# Patient Record
Sex: FEMALE | Race: WHITE | NOT HISPANIC OR LATINO | Employment: FULL TIME | URBAN - METROPOLITAN AREA
[De-identification: names, ages, dates, MRNs, and addresses within clinical notes are randomized per-mention and may not be internally consistent; named-entity substitution may affect disease eponyms.]

---

## 2017-01-05 DIAGNOSIS — F41.9 ANXIETY: ICD-10-CM

## 2017-01-05 RX ORDER — ALPRAZOLAM 0.25 MG/1
TABLET ORAL
Qty: 30 TABLET | Refills: 0 | Status: SHIPPED | OUTPATIENT
Start: 2017-01-05 | End: 2017-01-23 | Stop reason: SDUPTHER

## 2017-01-12 ENCOUNTER — TELEPHONE (OUTPATIENT)
Dept: OBSTETRICS AND GYNECOLOGY | Facility: CLINIC | Age: 37
End: 2017-01-12

## 2017-01-12 NOTE — TELEPHONE ENCOUNTER
Pt states she was told to call Lesley to complete the paperwork for a gene mutation test for this coming Monday.      Pt Ph  [phone]

## 2017-01-16 DIAGNOSIS — Z80.3 FAMILY HISTORY OF BREAST CANCER IN MOTHER: Primary | ICD-10-CM

## 2017-01-23 ENCOUNTER — OFFICE VISIT (OUTPATIENT)
Dept: OBSTETRICS AND GYNECOLOGY | Facility: CLINIC | Age: 37
End: 2017-01-23
Payer: COMMERCIAL

## 2017-01-23 DIAGNOSIS — N92.0 MENORRHAGIA WITH REGULAR CYCLE: ICD-10-CM

## 2017-01-23 DIAGNOSIS — F41.9 ANXIETY: ICD-10-CM

## 2017-01-23 PROCEDURE — 99999 PR PBB SHADOW E&M-EST. PATIENT-LVL II: CPT | Mod: PBBFAC,,, | Performed by: OBSTETRICS & GYNECOLOGY

## 2017-01-23 PROCEDURE — 1159F MED LIST DOCD IN RCRD: CPT | Mod: S$GLB,,, | Performed by: OBSTETRICS & GYNECOLOGY

## 2017-01-23 PROCEDURE — 99212 OFFICE O/P EST SF 10 MIN: CPT | Mod: S$GLB,,, | Performed by: OBSTETRICS & GYNECOLOGY

## 2017-01-23 RX ORDER — MOMETASONE FUROATE 1 MG/G
OINTMENT TOPICAL
Refills: 0 | COMMUNITY
Start: 2017-01-05 | End: 2017-03-23 | Stop reason: CLARIF

## 2017-01-23 NOTE — MR AVS SNAPSHOT
[facility]  [address]  [place] [address]  Phone: [phone]  Fax: [phone]                  Erin Gonzalez   2017 1:30 PM   Office Visit    Description:  Female : 1980   Provider:  Nguyễn Barbosa MD   Department:  [facility]           Reason for Visit     Gyn ultrasound                To Do List           Goals (5 Years of Data)     None      [facility] On Call     [facility] On Call Nurse Care Line -  Assistance  Registered nurses in the [facility] On Call Center provide clinical advisement, health education, appointment booking, and other advisory services.  Call for this free service at [phone].             Medications                Verify that the below list of medications is an accurate representation of the medications you are currently taking.  If none reported, the list may be blank. If incorrect, please contact your healthcare provider. Carry this list with you in case of emergency.           Current Medications     alprazolam (XANAX) 0.25 MG tablet TAKE 1 TABLET BY MOUTH NIGHTLY AS NEEDED FOR ANXIETY    escitalopram oxalate (LEXAPRO) 20 MG tablet Take 1 tablet (20 mg total) by mouth once daily.    mometasone (ELOCON) 0.1 % ointment NELI BID    topiramate (TOPAMAX) 25 MG tablet TK 1 T PO D           Clinical Reference Information           Allergies as of 2017     Ciprofloxacin    Cephalexin      Immunizations Administered on Date of Encounter - 2017     None

## 2017-01-23 NOTE — PROGRESS NOTES
Subjective:       Patient ID: Erin Gonzalez is a 36 y.o. female.    Chief Complaint:  Gyn ultrasound (BRCA results pending)      History of Present Illness  Patient having 7 day cycles passing heavy clots and having to change her pad or tampon hourly on the worst day. We discussed endometrial ablation last year but her mom was in during chemotherapy and the patient could not take time off. Patient now interested in proceeding with an ablation.   Patient reports her cycles are lasting 7 days and she is changing her pad hourly for the first 3 days.  She is not a good candidate for birth control as her mother had breast cancer and patient is very nervous and concerned about birth control pills.  Her  has had a vasectomy for contraception      GYN & OB History  No LMP recorded.   Date of Last Pap: 2017    OB History    Para Term  AB SAB TAB Ectopic Multiple Living   4 4 3 1      4      # Outcome Date GA Lbr Jero/2nd Weight Sex Delivery Anes PTL Lv   4 Term  39w0d  3.6 kg (7 lb 15 oz) F Vag-Spont   Y   3 Term  38w0d  3.43 kg (7 lb 9 oz) F Vag-Spont   Y   2 Term  37w0d  3.572 kg (7 lb 14 oz) F Vag-Spont   Y   1   36w0d  3.402 kg (7 lb 8 oz) F Vag-Spont   Y          Review of Systems  Review of Systems   Constitutional: Negative for fatigue and unexpected weight change.   Respiratory: Negative for shortness of breath.    Cardiovascular: Negative for chest pain.   Gastrointestinal: Negative for abdominal pain, constipation, diarrhea, nausea and vomiting.   Genitourinary: Negative for dysuria.   Musculoskeletal: Negative for back pain.   Skin: Negative for rash.   Neurological: Negative for headaches.   Hematological: Does not bruise/bleed easily.   Psychiatric/Behavioral: Negative for behavioral problems.        Objective:   Physical Exam:   Constitutional: She appears well-developed and well-nourished.                                  Assessment/ Plan:          Erin was seen  today for gyn ultrasound.    Diagnoses and all orders for this visit:    Menorrhagia with regular cycle   patient desires to proceed with endometrial ablation.  Risk and benefits extensively discussed with patient.    Patient still awaiting results of BRCA testing.  Drawn one week ago.    No Follow-up on file.      Health Maintenance       Date Due Completion Date    Lipid Panel 1980 ---    TETANUS VACCINE 4/12/1999 ---    Influenza Vaccine 8/1/2016 ---    Pap Smear 12/12/2019 12/12/2016

## 2017-01-24 RX ORDER — ALPRAZOLAM 0.25 MG/1
TABLET ORAL
Qty: 30 TABLET | Refills: 0 | Status: SHIPPED | OUTPATIENT
Start: 2017-01-24 | End: 2017-01-28 | Stop reason: SDUPTHER

## 2017-01-26 ENCOUNTER — TELEPHONE (OUTPATIENT)
Dept: OBSTETRICS AND GYNECOLOGY | Facility: CLINIC | Age: 37
End: 2017-01-26

## 2017-01-28 DIAGNOSIS — F41.9 ANXIETY: ICD-10-CM

## 2017-01-30 ENCOUNTER — PATIENT MESSAGE (OUTPATIENT)
Dept: OBSTETRICS AND GYNECOLOGY | Facility: CLINIC | Age: 37
End: 2017-01-30

## 2017-01-30 RX ORDER — ALPRAZOLAM 0.25 MG/1
TABLET ORAL
Qty: 30 TABLET | Refills: 0 | Status: SHIPPED | OUTPATIENT
Start: 2017-01-30 | End: 2017-02-22 | Stop reason: DRUGHIGH

## 2017-02-06 ENCOUNTER — PATIENT MESSAGE (OUTPATIENT)
Dept: OBSTETRICS AND GYNECOLOGY | Facility: CLINIC | Age: 37
End: 2017-02-06

## 2017-02-22 ENCOUNTER — PATIENT MESSAGE (OUTPATIENT)
Dept: OBSTETRICS AND GYNECOLOGY | Facility: CLINIC | Age: 37
End: 2017-02-22

## 2017-02-22 DIAGNOSIS — F41.0 PANIC ATTACKS: Primary | ICD-10-CM

## 2017-02-22 RX ORDER — ALPRAZOLAM 1 MG/1
1 TABLET ORAL NIGHTLY PRN
Qty: 30 TABLET | Refills: 0 | Status: CANCELLED | OUTPATIENT
Start: 2017-02-22 | End: 2018-02-22

## 2017-02-22 RX ORDER — ALPRAZOLAM 1 MG/1
1 TABLET ORAL NIGHTLY PRN
Qty: 30 TABLET | Refills: 0 | Status: SHIPPED | OUTPATIENT
Start: 2017-02-22 | End: 2017-05-09 | Stop reason: SDUPTHER

## 2017-02-22 NOTE — TELEPHONE ENCOUNTER
Bone pt requesting her xanex prescription be increased to 1mg. States that she has been taking 3 once a day at night to sleep when needed and the effects are not lasting very long. States that she is still having panic attacks at night when she lays down. Allergies and pharmacy are up to date.

## 2017-02-24 ENCOUNTER — PATIENT MESSAGE (OUTPATIENT)
Dept: OBSTETRICS AND GYNECOLOGY | Facility: CLINIC | Age: 37
End: 2017-02-24

## 2017-02-27 RX ORDER — ZOLPIDEM TARTRATE 5 MG/1
5 TABLET ORAL NIGHTLY PRN
Qty: 20 TABLET | Refills: 3 | Status: SHIPPED | OUTPATIENT
Start: 2017-02-27 | End: 2017-03-20

## 2017-03-06 ENCOUNTER — TELEPHONE (OUTPATIENT)
Dept: OBSTETRICS AND GYNECOLOGY | Facility: CLINIC | Age: 37
End: 2017-03-06

## 2017-03-06 DIAGNOSIS — N92.0 EXCESSIVE OR FREQUENT MENSTRUATION: Primary | ICD-10-CM

## 2017-03-06 NOTE — TELEPHONE ENCOUNTER
Dr Barbosa pt calling was seen a couple of weeks back and was told someone would be calling her for an ablation.Pt states she hasn't heard form anyone yet and was told by dr barbosa that Yaneli would be calling her.I spoke with Yaneli and she has nothing on this pt and she would like to get this done. Pt # [phone]

## 2017-03-16 ENCOUNTER — OFFICE VISIT (OUTPATIENT)
Dept: OBSTETRICS AND GYNECOLOGY | Facility: CLINIC | Age: 37
End: 2017-03-16
Payer: COMMERCIAL

## 2017-03-16 VITALS
DIASTOLIC BLOOD PRESSURE: 72 MMHG | BODY MASS INDEX: 40.22 KG/M2 | HEART RATE: 72 BPM | HEIGHT: 64 IN | RESPIRATION RATE: 12 BRPM | SYSTOLIC BLOOD PRESSURE: 110 MMHG | WEIGHT: 235.56 LBS

## 2017-03-16 DIAGNOSIS — Z01.818 PRE-OP EXAMINATION: Primary | ICD-10-CM

## 2017-03-16 DIAGNOSIS — N92.0 MENORRHAGIA WITH REGULAR CYCLE: ICD-10-CM

## 2017-03-16 DIAGNOSIS — N92.0 MENORRHAGIA: ICD-10-CM

## 2017-03-16 DIAGNOSIS — Z01.818 PREOP EXAMINATION: ICD-10-CM

## 2017-03-16 PROCEDURE — 99999 PR PBB SHADOW E&M-EST. PATIENT-LVL III: CPT | Mod: PBBFAC,,, | Performed by: OBSTETRICS & GYNECOLOGY

## 2017-03-16 PROCEDURE — 99499 UNLISTED E&M SERVICE: CPT | Mod: S$GLB,,, | Performed by: OBSTETRICS & GYNECOLOGY

## 2017-03-16 RX ORDER — ONDANSETRON HYDROCHLORIDE 8 MG/1
8 TABLET, FILM COATED ORAL EVERY 8 HOURS PRN
Qty: 15 TABLET | Refills: 0 | Status: SHIPPED | OUTPATIENT
Start: 2017-03-16 | End: 2017-04-27

## 2017-03-16 RX ORDER — OXYCODONE AND ACETAMINOPHEN 7.5; 325 MG/1; MG/1
1 TABLET ORAL EVERY 4 HOURS PRN
Qty: 15 TABLET | Refills: 0 | Status: SHIPPED | OUTPATIENT
Start: 2017-03-16 | End: 2017-04-27

## 2017-03-16 RX ORDER — ESZOPICLONE 3 MG/1
3 TABLET, FILM COATED ORAL NIGHTLY
Qty: 30 TABLET | Refills: 3 | Status: SHIPPED | OUTPATIENT
Start: 2017-03-16 | End: 2017-11-02

## 2017-03-16 RX ORDER — IBUPROFEN 800 MG/1
800 TABLET ORAL EVERY 8 HOURS PRN
Qty: 30 TABLET | Refills: 0 | Status: SHIPPED | OUTPATIENT
Start: 2017-03-16 | End: 2017-04-27

## 2017-03-16 NOTE — PROGRESS NOTES
CC: preop for moenorrhagia    Rubina Gonzalez is a 36 y.o. female  .  She is established.    Patient having 7 day cycles passing heavy clots and having to change her pad or tampon hourly on the worst day. We discussed endometrial ablation last year but her mom was in during chemotherapy and the patient could not take time off. Patient now interested in proceeding with an ablation and here for preop   Patient reports her cycles are lasting 7 days and she is changing her pad hourly for the first 3 days. She is not a good candidate for birth control as her mother had breast cancer and patient is very nervous and concerned about birth control pills.   Her  has had a vasectomy for contraception    Past Medical History:   Diagnosis Date    Abnormal Pap smear of cervix 2015    ASCUS / Hpv Neg    Depression     Family history of breast cancer in mother     dx. age 48    Galactorrhea     Insomnia     Migraine     topomax    Panic attacks        Past Surgical History:   Procedure Laterality Date    CHOLECYSTECTOMY         OB History    Para Term  AB SAB TAB Ectopic Multiple Living   4 4 3 1      4      # Outcome Date GA Lbr Jero/2nd Weight Sex Delivery Anes PTL Lv   4 Term  39w0d  3.6 kg (7 lb 15 oz) F Vag-Spont EPI  Y   3 Term  38w0d  3.43 kg (7 lb 9 oz) F Vag-Spont EPI  Y   2 Term  37w0d  3.572 kg (7 lb 14 oz) F Vag-Spont EPI  Y   1   36w0d  3.402 kg (7 lb 8 oz) F Vag-Spont EPI  Y      Obstetric Comments   Menarche 13       Family History   Problem Relation Age of Onset    Colon cancer Paternal Grandmother     Cancer Paternal Grandmother     Breast cancer Paternal Grandmother     Colon cancer Maternal Grandfather     Cancer Maternal Grandfather     Hypertension Father     Breast cancer Mother 57    Hypertension Mother     Cancer Mother     Breast cancer Paternal Aunt     Cancer Paternal Aunt     Ovarian cancer Neg Hx        Social History  "  Substance Use Topics    Smoking status: Never Smoker    Smokeless tobacco: None    Alcohol use Yes      Comment: Socially       /72  Pulse 72  Resp 12  Ht 5' 4" (1.626 m)  Wt 106.8 kg (235 lb 9 oz)  LMP 03/13/2017 (Exact Date)  BMI 40.43 kg/m2    ROS:  GENERAL: Denies weight gain or weight loss. Feeling well overall.   SKIN: Denies rash or lesions.   HEAD: Denies head injury or headache.   NODES: Denies enlarged lymph nodes.   CHEST: Denies chest pain or shortness of breath.   CARDIOVASCULAR: Denies palpitations or left sided chest pain.   ABDOMEN: No abdominal pain, constipation, diarrhea, nausea, vomiting or rectal bleeding.   URINARY: No frequency, dysuria, hematuria, or burning on urination.  REPRODUCTIVE: See HPI.   BREASTS: denies pain, lumps, or nipple discharge.   HEMATOLOGIC: No easy bruisability or excessive bleeding.  MUSCULOSKELETAL: Denies joint pain or swelling.   NEUROLOGIC: Denies syncope or weakness.   PSYCHIATRIC: Denies depression, anxiety or mood swings.    Physical Exam:    APPEARANCE: Well nourished, well developed, in no acute distress.  AFFECT: WNL, alert and oriented x 3  SKIN: No acne or hirsutism  NECK: Neck symmetric without masses or thyromegaly  CVS RRR  Lungs CTAB  ABDOMEN: Soft.  No tenderness or masses.  No hepatosplenomegaly.  No hernias.  PELVIC: deferred  EXTREMITIES: No edema.     Pelvic u/s:  Findings: The uterus measures 8.8 x 4.6 x 5.6 cm. Uterine parenchyma is homogeneous without evidence for masses. The endometrial stripe is normal in thickness and measures 0.6 cm.  The right ovary is normal in size and measures 3.3 x 3.4 x 2.0 cm. The left ovary is normal in size and measures 3.3 x 3.1 x 2.5 cm. Follicles are seen in both ovaries. Arterial and venous flow are preserved bilaterally. No free fluid is seen.      ASSESSMENT AND PLAN    Rubina was seen today for pre-op exam.    Diagnoses and all orders for this visit:    Pre-op examination  -     ibuprofen " (ADVIL,MOTRIN) 800 MG tablet; Take 1 tablet (800 mg total) by mouth every 8 (eight) hours as needed for Pain.  -     oxycodone-acetaminophen (PERCOCET) 7.5-325 mg per tablet; Take 1 tablet by mouth every 4 (four) hours as needed for Pain.  -     ondansetron (ZOFRAN) 8 MG tablet; Take 1 tablet (8 mg total) by mouth every 8 (eight) hours as needed for Nausea.    Preop examination    Menorrhagia with regular cycle    Other orders  -     eszopiclone (LUNESTA) 3 mg Tab; Take 1 tablet (3 mg total) by mouth nightly.        Return in about 4 weeks (around 4/13/2017).

## 2017-03-16 NOTE — MR AVS SNAPSHOT
[facility]  [address]  [place] [address]  Phone: [phone]  Fax: [phone]                  Rubina Gonzalez   3/16/2017 2:15 PM   Office Visit    Description:  Female : 1980   Provider:  Nguyễn Barbosa MD   Department:  [facility]           Reason for Visit     Pre-op Exam           Diagnoses this Visit        Comments    Pre-op examination    -  Primary     Preop examination         Menorrhagia with regular cycle                To Do List           Future Appointments        Provider Department Dept Phone    3/23/2017 9:00 AM PRE-ADMIT, [facility] [phone]    2017 2:00 PM Nguyễn Barbosa MD [facility] [phone]      Your Future Surgeries/Procedures     Mar 28, 2017   Surgery with Nguyễn Barbosa MD   [facility] ([facility])    [address] [facility] [id number] [phone]              Goals (5 Years of Data)     None      Follow-Up and Disposition     Return in about 4 weeks (around 2017).       These Medications        Disp Refills Start End    ibuprofen (ADVIL,MOTRIN) 800 MG tablet 30 tablet 0 3/16/2017 3/16/2018    Take 1 tablet (800 mg total) by mouth every 8 (eight) hours as needed for Pain. - Oral    Pharmacy: [facility] [address] [place] [address] [address] AT [facility] Ph #: [phone]       oxycodone-acetaminophen (PERCOCET) 7.5-325 mg per tablet 15 tablet 0 3/16/2017 3/16/2018    Take 1 tablet by mouth every 4 (four) hours as needed for Pain. - Oral    Pharmacy: [facility] [address] [place], [place] [address] [address] AT [facility] Ph #: [phone]       ondansetron (ZOFRAN) 8 MG tablet 15 tablet 0 3/16/2017     Take 1 tablet (8 mg total) by mouth every 8 (eight) hours as needed for Nausea. - Oral    Pharmacy: [facility] [id number] AT [facility] Ph #: [phone]       eszopiclone (LUNESTA) 3 mg Tab 30 tablet 3 3/16/2017 3/16/2018    Take 1 tablet (3 mg total) by mouth nightly. - Oral    Pharmacy: [facility] [id number]  [address] [facility] Ph #: [phone]         [facility] On Call     [facility] On Call Nurse Care Line - 24/7 Assistance  Registered nurses in the [facility] On Call Center provide clinical advisement, health education, appointment booking, and other advisory services.  Call for this free service at [phone].             Medications           START taking these NEW medications        Refills    ibuprofen (ADVIL,MOTRIN) 800 MG tablet 0    Sig: Take 1 tablet (800 mg total) by mouth every 8 (eight) hours as needed for Pain.    Class: Normal    Route: Oral    oxycodone-acetaminophen (PERCOCET) 7.5-325 mg per tablet 0    Sig: Take 1 tablet by mouth every 4 (four) hours as needed for Pain.    Class: Normal    Route: Oral    ondansetron (ZOFRAN) 8 MG tablet 0    Sig: Take 1 tablet (8 mg total) by mouth every 8 (eight) hours as needed for Nausea.    Class: Normal    Route: Oral    eszopiclone (LUNESTA) 3 mg Tab 3    Sig: Take 1 tablet (3 mg total) by mouth nightly.    Class: Normal    Route: Oral           Verify that the below list of medications is an accurate representation of the medications you are currently taking.  If none reported, the list may be blank. If incorrect, please contact your healthcare provider. Carry this list with you in case of emergency.           Current Medications     alprazolam (XANAX) 1 MG tablet Take 1 tablet (1 mg total) by mouth nightly as needed for Insomnia.    escitalopram oxalate (LEXAPRO) 20 MG tablet Take 1 tablet (20 mg total) by mouth once daily.    eszopiclone (LUNESTA) 3 mg Tab Take 1 tablet (3 mg total) by mouth nightly.    ibuprofen (ADVIL,MOTRIN) 800 MG tablet Take 1 tablet (800 mg total) by mouth every 8 (eight)  "hours as needed for Pain.    mometasone (ELOCON) 0.1 % ointment NELI BID    multivitamin with minerals tablet Take 1 tablet by mouth once daily. ( MEDI WEIGHT LOSS )    ondansetron (ZOFRAN) 8 MG tablet Take 1 tablet (8 mg total) by mouth every 8 (eight) hours as needed for Nausea.    oxycodone-acetaminophen (PERCOCET) 7.5-325 mg per tablet Take 1 tablet by mouth every 4 (four) hours as needed for Pain.    topiramate (TOPAMAX) 25 MG tablet TK 1 T PO D    zolpidem (AMBIEN) 5 MG Tab Take 1 tablet (5 mg total) by mouth nightly as needed.           Clinical Reference Information           Your Vitals Were     BP Pulse Resp Height Weight Last Period    110/72 72 12 5' 4" (1.626 m) 106.8 kg (235 lb 9 oz) 03/13/2017 (Exact Date)    BMI                40.43 kg/m2          Blood Pressure          Most Recent Value    BP  110/72      Allergies as of 3/16/2017     Ciprofloxacin    Cephalexin      Immunizations Administered on Date of Encounter - 3/16/2017     None      Language Assistance Services     ATTENTION: Language assistance services are available, free of charge. Please call [phone].      ATENCIÓN: Si habla ester, tiene a pack disposición servicios gratuitos de asistencia lingüística. Llame al [phone].     [facility] Ý: N?u b?n nói Ti?ng Vi?t, có các d?ch v? h? tr? ngôn ng? mi?n phí dành cho b?n. G?i s? [phone].         [facility] complies with applicable Federal civil rights laws and does not discriminate on the basis of race, color, national origin, age, disability, or sex.        "

## 2017-03-20 ENCOUNTER — OFFICE VISIT (OUTPATIENT)
Dept: OBSTETRICS AND GYNECOLOGY | Facility: CLINIC | Age: 37
End: 2017-03-20
Payer: COMMERCIAL

## 2017-03-20 ENCOUNTER — TELEPHONE (OUTPATIENT)
Dept: OBSTETRICS AND GYNECOLOGY | Facility: CLINIC | Age: 37
End: 2017-03-20

## 2017-03-20 VITALS
WEIGHT: 233.13 LBS | DIASTOLIC BLOOD PRESSURE: 68 MMHG | SYSTOLIC BLOOD PRESSURE: 108 MMHG | BODY MASS INDEX: 39.8 KG/M2 | HEIGHT: 64 IN

## 2017-03-20 DIAGNOSIS — N64.4 MASTODYNIA, FEMALE: ICD-10-CM

## 2017-03-20 DIAGNOSIS — Z12.31 ENCOUNTER FOR SCREENING MAMMOGRAM FOR BREAST CANCER: ICD-10-CM

## 2017-03-20 DIAGNOSIS — Z80.3 FAMILY HISTORY OF BREAST CANCER IN MOTHER: ICD-10-CM

## 2017-03-20 DIAGNOSIS — N64.52 NIPPLE DISCHARGE IN FEMALE: Primary | ICD-10-CM

## 2017-03-20 PROCEDURE — 99999 PR PBB SHADOW E&M-EST. PATIENT-LVL III: CPT | Mod: PBBFAC,,, | Performed by: NURSE PRACTITIONER

## 2017-03-20 PROCEDURE — 3078F DIAST BP <80 MM HG: CPT | Mod: S$GLB,,, | Performed by: NURSE PRACTITIONER

## 2017-03-20 PROCEDURE — 99213 OFFICE O/P EST LOW 20 MIN: CPT | Mod: S$GLB,,, | Performed by: NURSE PRACTITIONER

## 2017-03-20 PROCEDURE — 1160F RVW MEDS BY RX/DR IN RCRD: CPT | Mod: S$GLB,,, | Performed by: NURSE PRACTITIONER

## 2017-03-20 PROCEDURE — 3074F SYST BP LT 130 MM HG: CPT | Mod: S$GLB,,, | Performed by: NURSE PRACTITIONER

## 2017-03-20 NOTE — TELEPHONE ENCOUNTER
Pt states Friday she had pain in right breast.  When she expressed she had a  thick, white with green/brown tint discharge come out.  Reports a hx of drainage but usually its clear or like a milky color.  Scheduled appt with Joy today at 0940.

## 2017-03-20 NOTE — MR AVS SNAPSHOT
[facility]  [address]  [place] [address]  Phone: [phone]  Fax: [phone]                  Rubina Gonzalez   3/20/2017 9:40 AM   Office Visit    Description:  Female : 1980   Provider:  Joy Betancourt NP   Department:  [facility]           Reason for Visit     Breast Discharge           Diagnoses this Visit        Comments    Nipple discharge in female    -  Primary     Mastodynia, female         Family history of breast cancer in mother         Encounter for screening mammogram for breast cancer                To Do List           Future Appointments        Provider Department Dept Phone    3/21/2017 8:30 AM LAB, [facility]     3/23/2017 9:00 AM PRE-ADMIT, [facility] [phone]    2017 2:00 PM Nguyễn Barbosa MD [facility] [phone]      Your Future Surgeries/Procedures     Mar 28, 2017   Surgery with Nguyễn Barbosa MD   [facility] ([facility])    [address] [facility] [id number] [phone]              Goals (5 Years of Data)     None      Follow-Up and Disposition     Return if symptoms worsen or fail to improve.    Follow-up and Disposition History      [facility] On Call     [facility] On [facility] Nurse Care Line -  Assistance  Registered nurses in the [facility] On Call Center provide clinical advisement, health education, appointment booking, and other advisory services.  Call for this free service at [phone].             Medications           STOP taking these medications     zolpidem (AMBIEN) 5 MG Tab Take 1 tablet (5 mg total) by mouth nightly as needed.           Verify that the below list of medications is an accurate representation of the medications you are currently taking.  If none reported, the list may be blank. If incorrect, please contact your healthcare provider. Carry this list with you in case  "of emergency.           Current Medications     alprazolam (XANAX) 1 MG tablet Take 1 tablet (1 mg total) by mouth nightly as needed for Insomnia.    escitalopram oxalate (LEXAPRO) 20 MG tablet Take 1 tablet (20 mg total) by mouth once daily.    eszopiclone (LUNESTA) 3 mg Tab Take 1 tablet (3 mg total) by mouth nightly.    ibuprofen (ADVIL,MOTRIN) 800 MG tablet Take 1 tablet (800 mg total) by mouth every 8 (eight) hours as needed for Pain.    mometasone (ELOCON) 0.1 % ointment NELI BID    multivitamin with minerals tablet Take 1 tablet by mouth once daily. ( MEDI WEIGHT LOSS )    ondansetron (ZOFRAN) 8 MG tablet Take 1 tablet (8 mg total) by mouth every 8 (eight) hours as needed for Nausea.    oxycodone-acetaminophen (PERCOCET) 7.5-325 mg per tablet Take 1 tablet by mouth every 4 (four) hours as needed for Pain.    topiramate (TOPAMAX) 25 MG tablet TK 1 T PO D           Clinical Reference Information           Your Vitals Were     BP Height Weight Last Period BMI    108/68 5' 4" (1.626 m) 105.7 kg (233 lb 2.2 oz) 03/13/2017 40.02 kg/m2      Blood Pressure          Most Recent Value    BP  108/68      Allergies as of 3/20/2017     Ciprofloxacin    Cephalexin      Immunizations Administered on Date of Encounter - 3/20/2017     None      Orders Placed During Today's Visit     Future Labs/Procedures Expected by Expires    Comprehensive metabolic panel  3/20/2017 5/19/2018    hCG, quantitative  3/20/2017 5/19/2018    Mammo Digital Diagnostic Right with Tomosynthesis_CAD  3/20/2017 5/20/2018    Mammo Digital Screening Left with Tomosynthesis_CAD  3/20/2017 5/20/2018    Prolactin  3/20/2017 3/20/2018    US Breast Right Limited  3/20/2017 5/20/2018    TSH  As directed 3/20/2018      Language Assistance Services     ATTENTION: Language assistance services are available, free of charge. Please call [phone].      ATENCIÓN: Si habla español, tiene a pack disposición servicios gratuitos de asistencia lingüística. Llame al " [phone].     DOUG Ý: N?u b?n nói Ti?ng Vi?t, có các d?ch v? h? tr? ngôn ng? mi?n phí dành cho b?n. G?i s? [phone].         [facility] complies with applicable Federal civil rights laws and does not discriminate on the basis of race, color, national origin, age, disability, or sex.

## 2017-03-20 NOTE — PROGRESS NOTES
"Chief Complaint: Breast pain/discharge (right)     HPI:      Rubina Gonzalez is a 36 y.o.  who presents complaining of right breast pain, and thick, green nipple discharge (NOT spontaneous). Patient has regular monthly menses. Patient's last menstrual period was 2017.  Last mammogram done 16 at [facility] - normal per pt.  States her mother passed away from breast cancer in 2016.  After noticing right breast pain, she squeezed nipple and expressed small amount thick, light green discharge.      ROS:     GENERAL: Denies unintentional weight gain or weight loss. Feeling well overall.   ABDOMEN: Denies abdominal pain, constipation, diarrhea, nausea, vomiting, change in appetite.   URINARY: Denies frequency, dysuria, hematuria.  BREAST: See HPI  GYN: Denies abnormal bleeding or discharge.    Physical Exam:      PHYSICAL EXAM:  /68  Ht 5' 4" (1.626 m)  Wt 105.7 kg (233 lb 2.2 oz)  LMP 2017  BMI 40.02 kg/m2  Body mass index is 40.02 kg/(m^2).      APPEARANCE: Well nourished, well developed, in no acute distress.  BREASTS: Left breast soft to palpation & without tenderness; no lumps or masses palpated; no changes in skin texture and no nipple discharge noted.  Right breast mildly tender to palpation, predominantly around areola; no nipple discharge noted upon exam; no lumps, masses, or changes in skin texture noted.  EXTREMITIES: No edema.         Assessment/Plan:   1. Galactorrhea, right  2. Mastodynia, right  3. Family history of breast cancer (mother)  4. Left breast - enc for screen for breast cancer    Orders Placed This Encounter   Procedures    Mammo Digital Diagnostic Right with Tomosynthesis_CAD     Standing Status:   Future     Number of Occurrences:   1     Standing Expiration Date:   2018     Order Specific Question:   Reason for Exam:     Answer:   right galactorrhea; mastodynia     Order Specific Question:   Is the patient pregnant?     Answer:   No     Order Specific " Question:   May the Radiologist modify the order per protocol to meet the clinical needs of the patient?     Answer:   Yes    US Breast Right Limited     Standing Status:   Future     Number of Occurrences:   1     Standing Expiration Date:   5/20/2018     Order Specific Question:   Reason for Exam:     Answer:   right galactorrhea; mastodynia     Order Specific Question:   Is the patient pregnant?     Answer:   No     Order Specific Question:   May the Radiologist modify the order per protocol to meet the clinical needs of the patient?     Answer:   Yes    Mammo Digital Screening Left with Tomosynthesis_CAD     Standing Status:   Future     Number of Occurrences:   1     Standing Expiration Date:   5/20/2018     Order Specific Question:   Reason for Exam:     Answer:   screen     Order Specific Question:   Is the patient pregnant?     Answer:   No     Order Specific Question:   May the Radiologist modify the order per protocol to meet the clinical needs of the patient?     Answer:   Yes    TSH     Standing Status:   Future     Standing Expiration Date:   3/20/2018    Prolactin     Standing Status:   Future     Standing Expiration Date:   3/20/2018    hCG, quantitative     Standing Status:   Future     Standing Expiration Date:   5/19/2018    Comprehensive metabolic panel     Standing Status:   Future     Standing Expiration Date:   5/19/2018

## 2017-03-20 NOTE — TELEPHONE ENCOUNTER
Bone pt - pt said she has a brownish green discharge coming from her right nipple and would like to see what she should do about it.

## 2017-03-21 ENCOUNTER — TELEPHONE (OUTPATIENT)
Dept: OBSTETRICS AND GYNECOLOGY | Facility: CLINIC | Age: 37
End: 2017-03-21

## 2017-03-21 ENCOUNTER — LAB VISIT (OUTPATIENT)
Dept: LAB | Facility: HOSPITAL | Age: 37
End: 2017-03-21
Payer: COMMERCIAL

## 2017-03-21 DIAGNOSIS — Z80.3 FAMILY HISTORY OF BREAST CANCER IN MOTHER: ICD-10-CM

## 2017-03-21 DIAGNOSIS — N64.4 MASTODYNIA, FEMALE: ICD-10-CM

## 2017-03-21 DIAGNOSIS — N64.52 NIPPLE DISCHARGE IN FEMALE: ICD-10-CM

## 2017-03-21 DIAGNOSIS — N64.4 MASTODYNIA: Primary | ICD-10-CM

## 2017-03-21 DIAGNOSIS — Z12.31 ENCOUNTER FOR SCREENING MAMMOGRAM FOR MALIGNANT NEOPLASM OF BREAST: Primary | ICD-10-CM

## 2017-03-21 LAB
ALBUMIN SERPL BCP-MCNC: 3.6 G/DL
ALP SERPL-CCNC: 75 U/L
ALT SERPL W/O P-5'-P-CCNC: 16 U/L
ANION GAP SERPL CALC-SCNC: 9 MMOL/L
AST SERPL-CCNC: 14 U/L
BILIRUB SERPL-MCNC: 0.4 MG/DL
BUN SERPL-MCNC: 14 MG/DL
CALCIUM SERPL-MCNC: 8.9 MG/DL
CHLORIDE SERPL-SCNC: 108 MMOL/L
CO2 SERPL-SCNC: 24 MMOL/L
CREAT SERPL-MCNC: 0.9 MG/DL
EST. GFR  (AFRICAN AMERICAN): >60 ML/MIN/1.73 M^2
EST. GFR  (NON AFRICAN AMERICAN): >60 ML/MIN/1.73 M^2
GLUCOSE SERPL-MCNC: 91 MG/DL
HCG INTACT+B SERPL-ACNC: <1.2 MIU/ML
POTASSIUM SERPL-SCNC: 4 MMOL/L
PROLACTIN SERPL IA-MCNC: 15.9 NG/ML
PROT SERPL-MCNC: 7.2 G/DL
SODIUM SERPL-SCNC: 141 MMOL/L
TSH SERPL DL<=0.005 MIU/L-ACNC: 1.66 UIU/ML

## 2017-03-21 PROCEDURE — 84443 ASSAY THYROID STIM HORMONE: CPT

## 2017-03-21 PROCEDURE — 84702 CHORIONIC GONADOTROPIN TEST: CPT

## 2017-03-21 PROCEDURE — 80053 COMPREHEN METABOLIC PANEL: CPT

## 2017-03-21 PROCEDURE — 84146 ASSAY OF PROLACTIN: CPT

## 2017-03-21 NOTE — PROGRESS NOTES
"Message sent to portal.....    "Elver Cespedes,   Good news - I wanted to let you know that your blood work came back and all of them were normal!  I checked in the [facility] system and did not see any results for your mammogram or ultrasound yet, but I will continue to look out for them.  If you have your mammogram done and do not hear from us within 72 hours, please call and notify our office.  Once we have those results, we can determine a plan of care from there.    Sincerely,   FREDY Zendejas""

## 2017-03-21 NOTE — TELEPHONE ENCOUNTER
Left voicemail for pt that labs normal, and still waiting on [facility] mammo/ultrasound results.  Will determine POC at that point once those results are rec'd.  Advised pt to call ofc if she has any further questions in the meanwhile.

## 2017-03-21 NOTE — MR AVS SNAPSHOT
[facility] -  Lab  [address], 1st [address]  [facility] [address]                  Rubina Gonzalez   3/21/2017 8:30 AM   Lab Visit    Description:  Female : 1980   [facility]  [facility]   Department:  [facility]  [facility]           Diagnoses this Visit        Comments    Nipple discharge in female         Mastodynia, female         Family history of breast cancer in mother                To Do List           Future Appointments        Provider Department Dept Phone    3/23/2017 9:00 AM PRE-ADMIT, [facility] [phone]    2017 2:00 PM Nguyễn Barbosa MD [facility] [phone]      Your Future Surgeries/Procedures     Mar 28, 2017   Surgery with Nguyễn Barbosa MD   [facility] ([facility])    [address]  [facility] [address]   [phone]              Goals (5 Years of Data)     None      [facility]     [facility] Nurse Care Line -  Assistance  Registered nurses in the [facility] provide clinical advisement, health education, appointment booking, and other advisory services.  Call for this free service at [phone].             Medications                Verify that the below list of medications is an accurate representation of the medications you are currently taking.  If none reported, the list may be blank. If incorrect, please contact your healthcare provider. Carry this list with you in case of emergency.           Current Medications     alprazolam (XANAX) 1 MG tablet Take 1 tablet (1 mg total) by mouth nightly as needed for Insomnia.    escitalopram oxalate (LEXAPRO) 20 MG tablet Take 1 tablet (20 mg total) by mouth once daily.    eszopiclone (LUNESTA) 3 mg Tab Take 1 tablet (3 mg total) by mouth nightly.    ibuprofen (ADVIL,MOTRIN) 800 MG tablet Take 1 tablet (800 mg total) by mouth every 8 (eight) hours as needed for Pain.    mometasone  (ELOCON) 0.1 % ointment NELI BID    multivitamin with minerals tablet Take 1 tablet by mouth once daily. ( MEDI WEIGHT LOSS )    ondansetron (ZOFRAN) 8 MG tablet Take 1 tablet (8 mg total) by mouth every 8 (eight) hours as needed for Nausea.    oxycodone-acetaminophen (PERCOCET) 7.5-325 mg per tablet Take 1 tablet by mouth every 4 (four) hours as needed for Pain.    topiramate (TOPAMAX) 25 MG tablet TK 1 T PO D           Clinical Reference Information           Your Vitals Were     Last Period                   03/13/2017           Allergies as of 3/21/2017     Ciprofloxacin    Cephalexin      Immunizations Administered on Date of Encounter - 3/21/2017     None      Orders Placed During Today's Visit      Normal Orders This Visit    Comprehensive metabolic panel     hCG, quantitative     Prolactin     TSH       Language Assistance Services     ATTENTION: Language assistance services are available, free of charge. Please call [phone].      ATENCIÓN: Si habla español, tiene a pack disposición servicios gratuitos de asistencia lingüística. Llame al [phone].     DOUG Ý: N?u b?n nói Ti?ng Vi?t, có các d?ch v? h? tr? ngôn ng? mi?n phí dành cho b?n. G?i s? [phone].         [facility]  Lab complies with applicable Federal civil rights laws and does not discriminate on the basis of race, color, national origin, age, disability, or sex.

## 2017-03-23 ENCOUNTER — ANESTHESIA EVENT (OUTPATIENT)
Dept: SURGERY | Facility: OTHER | Age: 37
End: 2017-03-23
Payer: COMMERCIAL

## 2017-03-23 ENCOUNTER — HOSPITAL ENCOUNTER (OUTPATIENT)
Dept: RADIOLOGY | Facility: HOSPITAL | Age: 37
Discharge: HOME | End: 2017-03-23
Attending: OBSTETRICS & GYNECOLOGY
Payer: COMMERCIAL

## 2017-03-23 ENCOUNTER — TELEPHONE (OUTPATIENT)
Dept: OBSTETRICS AND GYNECOLOGY | Facility: CLINIC | Age: 37
End: 2017-03-23

## 2017-03-23 ENCOUNTER — HOSPITAL ENCOUNTER (OUTPATIENT)
Dept: PREADMISSION TESTING | Facility: OTHER | Age: 37
Discharge: HOME | End: 2017-03-23
Attending: OBSTETRICS & GYNECOLOGY
Payer: COMMERCIAL

## 2017-03-23 VITALS
TEMPERATURE: 98 F | DIASTOLIC BLOOD PRESSURE: 91 MMHG | HEIGHT: 64 IN | SYSTOLIC BLOOD PRESSURE: 130 MMHG | HEART RATE: 70 BPM | WEIGHT: 230 LBS | OXYGEN SATURATION: 99 % | BODY MASS INDEX: 39.27 KG/M2

## 2017-03-23 DIAGNOSIS — N64.4 MASTODYNIA: ICD-10-CM

## 2017-03-23 DIAGNOSIS — Z80.3 FAMILY HISTORY OF BREAST CANCER IN MOTHER: ICD-10-CM

## 2017-03-23 DIAGNOSIS — N64.52 NIPPLE DISCHARGE IN FEMALE: ICD-10-CM

## 2017-03-23 PROCEDURE — 77062 BREAST TOMOSYNTHESIS BI: CPT | Mod: 26,,, | Performed by: RADIOLOGY

## 2017-03-23 PROCEDURE — 76642 ULTRASOUND BREAST LIMITED: CPT | Mod: 26,RT,, | Performed by: RADIOLOGY

## 2017-03-23 PROCEDURE — 77066 DX MAMMO INCL CAD BI: CPT | Mod: 26,,, | Performed by: RADIOLOGY

## 2017-03-23 PROCEDURE — 76642 ULTRASOUND BREAST LIMITED: CPT | Mod: TC,RT

## 2017-03-23 PROCEDURE — 77066 DX MAMMO INCL CAD BI: CPT | Mod: TC

## 2017-03-23 RX ORDER — FAMOTIDINE 20 MG/1
20 TABLET, FILM COATED ORAL
Status: CANCELLED | OUTPATIENT
Start: 2017-03-23 | End: 2017-03-23

## 2017-03-23 RX ORDER — MIDAZOLAM HYDROCHLORIDE 5 MG/ML
5 INJECTION INTRAMUSCULAR; INTRAVENOUS
Status: ACTIVE | OUTPATIENT
Start: 2017-03-23 | End: 2017-03-23

## 2017-03-23 RX ORDER — SODIUM CHLORIDE, SODIUM LACTATE, POTASSIUM CHLORIDE, CALCIUM CHLORIDE 600; 310; 30; 20 MG/100ML; MG/100ML; MG/100ML; MG/100ML
INJECTION, SOLUTION INTRAVENOUS CONTINUOUS
Status: CANCELLED | OUTPATIENT
Start: 2017-03-23

## 2017-03-23 NOTE — TELEPHONE ENCOUNTER
Pt had a mammo done today and they told her they found something and need to do a biopsy tomorrow. Pt was upset and would like to talk with  as soon as she is available.

## 2017-03-23 NOTE — IP AVS SNAPSHOT
[facility] ([facility])  [address] [place], [place] [id number]  Phone: [phone]           Patient Discharge Instructions    Our goal is to set you up for success. This packet includes information on your condition, medications, and your home care. It will help you to care for yourself so you don't get sicker.     Please ask your nurse if you have any questions.        There are many details to remember when preparing for your surgery. Here is what you will need to do, please ask your nurse if there are more specific instructions and if you have any questions:    1. 24 hours before procedure Do not smoke or drink alcoholic beverages 24 hours prior to your procedure    2. Eating before procedure Do not eat or drink anything 8 hours before your procedure - this includes gum, mints, and candy.     3. Day of procedure Please remove all jewelry for the procedure. If you wear contact lenses, dentures, hearing aids or glasses, bring a container to put them in during your surgery and give to a family member for safekeeping.  If your doctor has scheduled you for an overnight stay, bring a small overnight bag with any personal items that you need.    4. After procedure Make arrangements in advance for transportation home by a responsible adult. It is not safe to drive a vehicle during the 24 hours following surgery.     PLEASE NOTE: You may be contacted the day before your surgery to confirm your surgery date and arrival time. The Surgery schedule has many variables which may affect the time of your surgery case. Family members should be available if your surgery time changes.                [facility]  Unless otherwise directed by your provider, please contact [facility], our nurse care line that is available for 24/7 assistance.     [phone] (toll-free)    Registered nurses in the [facility] provide clinical advisement, health education, appointment booking, and other  advisory services.                    ** Verify the list of medication(s) below is accurate and up to date. Carry this with you in case of emergency. If your medications have changed, please notify your healthcare provider.             Medication List      TAKE these medications        Additional Info                      alprazolam 1 MG tablet   Commonly known as:  XANAX   Quantity:  30 tablet   Refills:  0   Dose:  1 mg    Instructions:  Take 1 tablet (1 mg total) by mouth nightly as needed for Insomnia.     Begin Date    AM    Noon    PM    Bedtime       escitalopram oxalate 20 MG tablet   Commonly known as:  LEXAPRO   Quantity:  30 tablet   Refills:  11   Dose:  20 mg    Instructions:  Take 1 tablet (20 mg total) by mouth once daily.     Begin Date    AM    Noon    PM    Bedtime       eszopiclone 3 mg Tab   Commonly known as:  LUNESTA   Quantity:  30 tablet   Refills:  3   Dose:  3 mg    Instructions:  Take 1 tablet (3 mg total) by mouth nightly.     Begin Date    AM    Noon    PM    Bedtime       ibuprofen 800 MG tablet   Commonly known as:  ADVIL,MOTRIN   Quantity:  30 tablet   Refills:  0   Dose:  800 mg    Instructions:  Take 1 tablet (800 mg total) by mouth every 8 (eight) hours as needed for Pain.     Begin Date    AM    Noon    PM    Bedtime       multivitamin with minerals tablet   Refills:  0   Dose:  1 tablet    Instructions:  Take 1 tablet by mouth once daily. ( MEDI WEIGHT LOSS )     Begin Date    AM    Noon    PM    Bedtime       ondansetron 8 MG tablet   Commonly known as:  ZOFRAN   Quantity:  15 tablet   Refills:  0   Dose:  8 mg    Instructions:  Take 1 tablet (8 mg total) by mouth every 8 (eight) hours as needed for Nausea.     Begin Date    AM    Noon    PM    Bedtime       oxycodone-acetaminophen 7.5-325 mg per tablet   Commonly known as:  PERCOCET   Quantity:  15 tablet   Refills:  0   Dose:  1 tablet    Instructions:  Take 1 tablet by mouth every 4 (four) hours as needed for Pain.     Begin  Date    AM    Noon    PM    Bedtime       topiramate 25 MG tablet   Commonly known as:  TOPAMAX   Refills:  3    Instructions:  TK 1 T PO D     Begin Date    AM    Noon    PM    Bedtime                  Please bring to all follow up appointments:    1. A copy of your discharge instructions.  2. All medicines you are currently taking in their original bottles.  3. Identification and insurance card.    Please arrive 15 minutes ahead of scheduled appointment time.    Please call 24 hours in advance if you must reschedule your appointment and/or time.        Your Scheduled Appointments     Mar 23, 2017  1:00 PM CDT   Mammo Diag with [facility] MAMMO3 DX   [facility] ([facility] )    [address] [facility] [id number] [phone]            Mar 23, 2017  1:20 PM CDT   Us Breast with [facility] MAMMO7 US   [facility] ([facility] )    [address] [facility] [id number] [phone]            Apr 27, 2017  2:00 PM CDT   Post OP with Nguyễn Barbosa MD   [facility] ([facility] [place])    [address]  [place] [address] [phone]              Your Future Surgeries/Procedures     Mar 28, 2017   Surgery with Nguyễn Barbosa MD   [facility] ([facility])    [address] [facility] [id number] [phone]                  Discharge Instructions       PRE-ADMIT TESTING -  [phone]    [address] [place] [address]        OUTPATIENT SURGERY UNIT - [phone]    Your surgery has been scheduled at [facility]. We are pleased to have the opportunity to serve you. For Further Information please call [phone].    On the day of surgery please report to the Information Desk on the 1st floor.    CONTACT YOUR PHYSICIAN'S OFFICE THE DAY PRIOR TO YOUR SURGERY TO OBTAIN YOUR ARRIVAL TIME.     The evening before surgery do not eat anything after 9 p.m. ( this includes  hard candy, chewing gum and mints).  You may have GATORADE, POWERADE AND WATER FROM 9 p.m. until leaving home to come to the hospital.   DO NOT DRINK ANY LIQUIDS ON THE WAY TO THE HOSPITAL.     SPECIAL MEDICATION INSTRUCTIONS: TAKE medications checked off by the Anesthesiologist on your Medication List.    Angiogram Patients: Take medications as instructed by your physician, including aspirin.     Surgery Patients:    If you take ASPIRIN - Your PHYSICIAN/SURGEON will need to inform you IF/OR when you need to stop taking aspirin prior to your surgery.     Do Not take any medications containing IBUPROFEN.  Do Not Wear any make-up or dark nail polish   (especially eye make-up) to surgery. If you come to surgery with makeup on you will be required to remove the makeup or nail polish.    Do not shave your surgical area at least 5 days prior to your surgery. The surgical prep will be performed at the hospital according to Infection Control regulations.    Leave all valuables at home.   Do Not wear any jewelry or watches, including any metal in body piercings.  Contact Lens must be removed before surgery. Either do not wear the contact lens or bring a case and solution for storage.  Please bring a container for eyeglasses or dentures as required.  Bring any paperwork your physician has provided, such as consent forms,  history and physicals, doctor's orders, etc.   Bring comfortable clothes that are loose fitting to wear upon discharge. Take into consideration the type of surgery being performed.  Maintain your diet as advised per your physician the day prior to surgery.      Adequate rest the night before surgery is advised.   Park in the Parking lot behind the hospital or in the [facility] across the street from the parking lot. Parking is complimentary.  If you will be discharged the same day as your procedure, please arrange for a responsible adult to drive you home or to accompany you if traveling by  "taxi.   YOU WILL NOT BE PERMITTED TO DRIVE OR TO LEAVE THE HOSPITAL ALONE AFTER SURGERY.   It is strongly recommended that you arrange for someone to remain with you for the first 24 hrs following your surgery.       Thank you for your cooperation.  The Staff of [facility].        Bathing Instructions                                                                 Please shower the evening before and morning of your procedure with    ANTIBACTERIAL SOAP. ( DIAL, etc )  Concentrate on the surgical area   for at least 3 minutes and rinse completely. Dry off as usual.   Do not use any deodorant, powder, body lotions, perfume, after shave or    cologne.                                                Admission Information     Date & Time Provider Department CSN    3/23/2017  9:00 AM Nguyễn Barbosa MD [facility] [phone]      Care Providers     Provider Role Specialty Primary office phone    Nguyễn Barbosa MD Attending Provider Obstetrics [phone]      Your Vitals Were     BP Pulse Temp Height Weight Last Period    130/91 (BP Location: Right arm, Patient Position: Sitting) 70 98.1 °F (36.7 °C) (Oral) 5' 4" (1.626 m) 104.3 kg (230 lb) 03/13/2017    SpO2 BMI             99% 39.48 kg/m2         Recent Lab Values     No lab values to display.      Allergies as of 3/23/2017        Reactions    Ciprofloxacin     Other reaction(s): constipation & face swelling    Cephalexin Rash      Advance Directives     An advance directive is a document which, in the event you are no longer able to make decisions for yourself, tells your healthcare team what kind of treatment you do or do not want to receive, or who you would like to make those decisions for you.  If you do not currently have an advance directive, [facility] encourages you to create one.  For more information call:  [phone]), [phone] ([phone]),  or log on to [email or web address].        Language " Assistance Services     ATTENTION: Language assistance services are available, free of charge. Please call [phone].      ATENCIÓN: Si habla ester, tiene a pack disposición servicios gratuitos de asistencia lingüística. Llame al [phone].     CHÚ Ý: N?u b?n nói Ti?ng Vi?t, có các d?ch v? h? tr? ngôn ng? mi?n phí dành cho b?n. G?i s? [phone].         [facility] complies with applicable Federal civil rights laws and does not discriminate on the basis of race, color, national origin, age, disability, or sex.

## 2017-03-23 NOTE — TELEPHONE ENCOUNTER
Called and left a message for patient.  Report is still pending.  Patient did not answer so I left a detailed message reassuring her and also validating the fact that I know that she is extremely nervous and anxious.  Patient scheduled for biopsy tomorrow.

## 2017-03-23 NOTE — DISCHARGE INSTRUCTIONS
PRE-ADMIT TESTING -  [phone]    [address]  [facility]        OUTPATIENT SURGERY UNIT - [phone]    Your surgery has been scheduled at [facility]. We are pleased to have the opportunity to serve you. For Further Information please call [phone].    On the day of surgery please report to the Information Desk on the 1st floor.    CONTACT YOUR PHYSICIAN'S OFFICE THE DAY PRIOR TO YOUR SURGERY TO OBTAIN YOUR ARRIVAL TIME.     The evening before surgery do not eat anything after 9 p.m. ( this includes hard candy, chewing gum and mints).  You may have GATORADE, POWERADE AND WATER FROM 9 p.m. until leaving home to come to the hospital.   DO NOT DRINK ANY LIQUIDS ON THE WAY TO THE HOSPITAL.     SPECIAL MEDICATION INSTRUCTIONS: TAKE medications checked off by the Anesthesiologist on your Medication List.    Angiogram Patients: Take medications as instructed by your physician, including aspirin.     Surgery Patients:    If you take ASPIRIN - Your PHYSICIAN/SURGEON will need to inform you IF/OR when you need to stop taking aspirin prior to your surgery.     Do Not take any medications containing IBUPROFEN.  Do Not Wear any make-up or dark nail polish   (especially eye make-up) to surgery. If you come to surgery with makeup on you will be required to remove the makeup or nail polish.    Do not shave your surgical area at least 5 days prior to your surgery. The surgical prep will be performed at the hospital according to Infection Control regulations.    Leave all valuables at home.   Do Not wear any jewelry or watches, including any metal in body piercings.  Contact Lens must be removed before surgery. Either do not wear the contact lens or bring a case and solution for storage.  Please bring a container for eyeglasses or dentures as required.  Bring any paperwork your physician has provided, such as consent forms,  history and physicals, doctor's orders, etc.   Bring comfortable  clothes that are loose fitting to wear upon discharge. Take into consideration the type of surgery being performed.  Maintain your diet as advised per your physician the day prior to surgery.      Adequate rest the night before surgery is advised.   Park in the Parking lot behind the hospital or in the [facility] across the street from the parking lot. Parking is complimentary.  If you will be discharged the same day as your procedure, please arrange for a responsible adult to drive you home or to accompany you if traveling by taxi.   YOU WILL NOT BE PERMITTED TO DRIVE OR TO LEAVE THE HOSPITAL ALONE AFTER SURGERY.   It is strongly recommended that you arrange for someone to remain with you for the first 24 hrs following your surgery.       Thank you for your cooperation.  The Staff of [facility].        Bathing Instructions                                                                 Please shower the evening before and morning of your procedure with    ANTIBACTERIAL SOAP. ( DIAL, etc )  Concentrate on the surgical area   for at least 3 minutes and rinse completely. Dry off as usual.   Do not use any deodorant, powder, body lotions, perfume, after shave or    cologne.

## 2017-03-24 ENCOUNTER — HOSPITAL ENCOUNTER (OUTPATIENT)
Dept: RADIOLOGY | Facility: HOSPITAL | Age: 37
Discharge: HOME | End: 2017-03-24
Attending: OBSTETRICS & GYNECOLOGY
Payer: COMMERCIAL

## 2017-03-24 ENCOUNTER — TELEPHONE (OUTPATIENT)
Dept: OBSTETRICS AND GYNECOLOGY | Facility: CLINIC | Age: 37
End: 2017-03-24

## 2017-03-24 DIAGNOSIS — N63.0 LUMP OR MASS IN BREAST: Primary | ICD-10-CM

## 2017-03-24 PROCEDURE — 77065 DX MAMMO INCL CAD UNI: CPT | Mod: TC,RT

## 2017-03-24 PROCEDURE — 88305 TISSUE EXAM BY PATHOLOGIST: CPT | Mod: 26,,, | Performed by: PATHOLOGY

## 2017-03-24 PROCEDURE — 19083 BX BREAST 1ST LESION US IMAG: CPT | Mod: ,,, | Performed by: RADIOLOGY

## 2017-03-24 PROCEDURE — 88305 TISSUE EXAM BY PATHOLOGIST: CPT | Performed by: PATHOLOGY

## 2017-03-24 PROCEDURE — 77065 DX MAMMO INCL CAD UNI: CPT | Mod: 26,RT,, | Performed by: RADIOLOGY

## 2017-03-24 PROCEDURE — A4648 IMPLANTABLE TISSUE MARKER: HCPCS

## 2017-03-24 NOTE — TELEPHONE ENCOUNTER
DR Bone pt called, pt is having a Bx of the breast and they told her they have to remove the duct with a surgeon.Pt said she will feel more comfortable going to Dr Quarles for this.Just a FYI

## 2017-03-27 ENCOUNTER — TELEPHONE (OUTPATIENT)
Dept: OBSTETRICS AND GYNECOLOGY | Facility: CLINIC | Age: 37
End: 2017-03-27

## 2017-03-28 ENCOUNTER — HOSPITAL ENCOUNTER (OUTPATIENT)
Facility: OTHER | Age: 37
Discharge: HOME | End: 2017-03-28
Attending: OBSTETRICS & GYNECOLOGY | Admitting: OBSTETRICS & GYNECOLOGY
Payer: COMMERCIAL

## 2017-03-28 ENCOUNTER — PATIENT MESSAGE (OUTPATIENT)
Dept: OBSTETRICS AND GYNECOLOGY | Facility: CLINIC | Age: 37
End: 2017-03-28

## 2017-03-28 ENCOUNTER — ANESTHESIA (OUTPATIENT)
Dept: SURGERY | Facility: OTHER | Age: 37
End: 2017-03-28
Payer: COMMERCIAL

## 2017-03-28 VITALS
RESPIRATION RATE: 16 BRPM | TEMPERATURE: 97 F | SYSTOLIC BLOOD PRESSURE: 124 MMHG | OXYGEN SATURATION: 99 % | WEIGHT: 230 LBS | DIASTOLIC BLOOD PRESSURE: 74 MMHG | HEART RATE: 58 BPM | BODY MASS INDEX: 39.27 KG/M2 | HEIGHT: 64 IN

## 2017-03-28 DIAGNOSIS — N92.0 MENORRHAGIA: ICD-10-CM

## 2017-03-28 DIAGNOSIS — N92.0 MENORRHAGIA WITH REGULAR CYCLE: ICD-10-CM

## 2017-03-28 LAB
B-HCG UR QL: NEGATIVE
CTP QC/QA: YES

## 2017-03-28 PROCEDURE — 37000009 HC ANESTHESIA EA ADD 15 MINS: Performed by: OBSTETRICS & GYNECOLOGY

## 2017-03-28 PROCEDURE — 25000003 PHARM REV CODE 250: Performed by: ANESTHESIOLOGY

## 2017-03-28 PROCEDURE — 36000706: Performed by: OBSTETRICS & GYNECOLOGY

## 2017-03-28 PROCEDURE — 27201423 OPTIME MED/SURG SUP & DEVICES STERILE SUPPLY: Performed by: OBSTETRICS & GYNECOLOGY

## 2017-03-28 PROCEDURE — 63600175 PHARM REV CODE 636 W HCPCS: Performed by: SPECIALIST

## 2017-03-28 PROCEDURE — 63600175 PHARM REV CODE 636 W HCPCS: Performed by: ANESTHESIOLOGY

## 2017-03-28 PROCEDURE — 36000707: Performed by: OBSTETRICS & GYNECOLOGY

## 2017-03-28 PROCEDURE — 58563 HYSTEROSCOPY ABLATION: CPT | Mod: ,,, | Performed by: OBSTETRICS & GYNECOLOGY

## 2017-03-28 PROCEDURE — 71000016 HC POSTOP RECOV ADDL HR: Performed by: OBSTETRICS & GYNECOLOGY

## 2017-03-28 PROCEDURE — 71000015 HC POSTOP RECOV 1ST HR: Performed by: OBSTETRICS & GYNECOLOGY

## 2017-03-28 PROCEDURE — 37000008 HC ANESTHESIA 1ST 15 MINUTES: Performed by: OBSTETRICS & GYNECOLOGY

## 2017-03-28 PROCEDURE — 25000003 PHARM REV CODE 250: Performed by: SPECIALIST

## 2017-03-28 PROCEDURE — 71000033 HC RECOVERY, INTIAL HOUR: Performed by: OBSTETRICS & GYNECOLOGY

## 2017-03-28 PROCEDURE — 81025 URINE PREGNANCY TEST: CPT | Performed by: ANESTHESIOLOGY

## 2017-03-28 PROCEDURE — 63600175 PHARM REV CODE 636 W HCPCS: Performed by: NURSE ANESTHETIST, CERTIFIED REGISTERED

## 2017-03-28 PROCEDURE — 25000003 PHARM REV CODE 250: Performed by: NURSE ANESTHETIST, CERTIFIED REGISTERED

## 2017-03-28 PROCEDURE — 25000003 PHARM REV CODE 250: Performed by: OBSTETRICS & GYNECOLOGY

## 2017-03-28 RX ORDER — SODIUM CHLORIDE, SODIUM LACTATE, POTASSIUM CHLORIDE, CALCIUM CHLORIDE 600; 310; 30; 20 MG/100ML; MG/100ML; MG/100ML; MG/100ML
INJECTION, SOLUTION INTRAVENOUS CONTINUOUS
Status: DISCONTINUED | OUTPATIENT
Start: 2017-03-28 | End: 2017-03-28 | Stop reason: HOSPADM

## 2017-03-28 RX ORDER — FENTANYL CITRATE 50 UG/ML
INJECTION, SOLUTION INTRAMUSCULAR; INTRAVENOUS
Status: DISCONTINUED | OUTPATIENT
Start: 2017-03-28 | End: 2017-03-28

## 2017-03-28 RX ORDER — MEPERIDINE HYDROCHLORIDE 50 MG/ML
12.5 INJECTION INTRAMUSCULAR; INTRAVENOUS; SUBCUTANEOUS ONCE AS NEEDED
Status: DISCONTINUED | OUTPATIENT
Start: 2017-03-28 | End: 2017-03-28 | Stop reason: HOSPADM

## 2017-03-28 RX ORDER — ONDANSETRON 2 MG/ML
INJECTION INTRAMUSCULAR; INTRAVENOUS
Status: DISCONTINUED | OUTPATIENT
Start: 2017-03-28 | End: 2017-03-28

## 2017-03-28 RX ORDER — HYDROMORPHONE HYDROCHLORIDE 2 MG/ML
0.4 INJECTION, SOLUTION INTRAMUSCULAR; INTRAVENOUS; SUBCUTANEOUS EVERY 5 MIN PRN
Status: DISCONTINUED | OUTPATIENT
Start: 2017-03-28 | End: 2017-03-28 | Stop reason: HOSPADM

## 2017-03-28 RX ORDER — OXYCODONE HYDROCHLORIDE 5 MG/1
5 TABLET ORAL
Status: DISCONTINUED | OUTPATIENT
Start: 2017-03-28 | End: 2017-03-28 | Stop reason: HOSPADM

## 2017-03-28 RX ORDER — LIDOCAINE HCL/PF 100 MG/5ML
SYRINGE (ML) INTRAVENOUS
Status: DISCONTINUED | OUTPATIENT
Start: 2017-03-28 | End: 2017-03-28

## 2017-03-28 RX ORDER — FAMOTIDINE 20 MG/1
20 TABLET, FILM COATED ORAL
Status: COMPLETED | OUTPATIENT
Start: 2017-03-28 | End: 2017-03-28

## 2017-03-28 RX ORDER — FENTANYL CITRATE 50 UG/ML
25 INJECTION, SOLUTION INTRAMUSCULAR; INTRAVENOUS EVERY 5 MIN PRN
Status: COMPLETED | OUTPATIENT
Start: 2017-03-28 | End: 2017-03-28

## 2017-03-28 RX ORDER — KETOROLAC TROMETHAMINE 30 MG/ML
INJECTION, SOLUTION INTRAMUSCULAR; INTRAVENOUS
Status: DISCONTINUED | OUTPATIENT
Start: 2017-03-28 | End: 2017-03-28

## 2017-03-28 RX ORDER — PROPOFOL 10 MG/ML
VIAL (ML) INTRAVENOUS
Status: DISCONTINUED | OUTPATIENT
Start: 2017-03-28 | End: 2017-03-28

## 2017-03-28 RX ORDER — MIDAZOLAM HYDROCHLORIDE 1 MG/ML
2 INJECTION, SOLUTION INTRAMUSCULAR; INTRAVENOUS ONCE
Status: COMPLETED | OUTPATIENT
Start: 2017-03-28 | End: 2017-03-28

## 2017-03-28 RX ORDER — SODIUM CHLORIDE 9 MG/ML
INJECTION, SOLUTION INTRAVENOUS CONTINUOUS
Status: DISCONTINUED | OUTPATIENT
Start: 2017-03-28 | End: 2017-03-28 | Stop reason: HOSPADM

## 2017-03-28 RX ORDER — MIDAZOLAM HYDROCHLORIDE 1 MG/ML
INJECTION INTRAMUSCULAR; INTRAVENOUS
Status: DISCONTINUED | OUTPATIENT
Start: 2017-03-28 | End: 2017-03-28

## 2017-03-28 RX ORDER — GLYCOPYRROLATE 0.2 MG/ML
INJECTION INTRAMUSCULAR; INTRAVENOUS
Status: DISCONTINUED | OUTPATIENT
Start: 2017-03-28 | End: 2017-03-28

## 2017-03-28 RX ORDER — ONDANSETRON 2 MG/ML
4 INJECTION INTRAMUSCULAR; INTRAVENOUS ONCE AS NEEDED
Status: COMPLETED | OUTPATIENT
Start: 2017-03-28 | End: 2017-03-28

## 2017-03-28 RX ORDER — SODIUM CHLORIDE 0.9 % (FLUSH) 0.9 %
3 SYRINGE (ML) INJECTION
Status: DISCONTINUED | OUTPATIENT
Start: 2017-03-28 | End: 2017-03-28 | Stop reason: HOSPADM

## 2017-03-28 RX ADMIN — IBUPROFEN 800 MG: 800 INJECTION INTRAVENOUS at 04:03

## 2017-03-28 RX ADMIN — KETOROLAC TROMETHAMINE 30 MG: 30 INJECTION, SOLUTION INTRAMUSCULAR; INTRAVENOUS at 12:03

## 2017-03-28 RX ADMIN — CARBOXYMETHYLCELLULOSE SODIUM 2 DROP: 2.5 SOLUTION/ DROPS OPHTHALMIC at 12:03

## 2017-03-28 RX ADMIN — PROPOFOL 150 MG: 10 INJECTION, EMULSION INTRAVENOUS at 12:03

## 2017-03-28 RX ADMIN — ONDANSETRON 4 MG: 2 INJECTION INTRAMUSCULAR; INTRAVENOUS at 12:03

## 2017-03-28 RX ADMIN — LIDOCAINE HYDROCHLORIDE 80 MG: 20 INJECTION, SOLUTION INTRAVENOUS at 12:03

## 2017-03-28 RX ADMIN — OXYCODONE HYDROCHLORIDE 5 MG: 5 TABLET ORAL at 02:03

## 2017-03-28 RX ADMIN — FENTANYL CITRATE 25 MCG: 50 INJECTION INTRAMUSCULAR; INTRAVENOUS at 01:03

## 2017-03-28 RX ADMIN — MIDAZOLAM HYDROCHLORIDE 2 MG: 1 INJECTION, SOLUTION INTRAMUSCULAR; INTRAVENOUS at 11:03

## 2017-03-28 RX ADMIN — GLYCOPYRROLATE 0.2 MG: 0.2 INJECTION, SOLUTION INTRAMUSCULAR; INTRAVENOUS at 12:03

## 2017-03-28 RX ADMIN — FENTANYL CITRATE 100 MCG: 50 INJECTION, SOLUTION INTRAMUSCULAR; INTRAVENOUS at 12:03

## 2017-03-28 RX ADMIN — DOXYCYCLINE 100 MG: 100 INJECTION, POWDER, LYOPHILIZED, FOR SOLUTION INTRAVENOUS at 12:03

## 2017-03-28 RX ADMIN — FAMOTIDINE 20 MG: 20 TABLET, FILM COATED ORAL at 11:03

## 2017-03-28 RX ADMIN — ONDANSETRON 4 MG: 2 INJECTION INTRAMUSCULAR; INTRAVENOUS at 04:03

## 2017-03-28 RX ADMIN — MIDAZOLAM HYDROCHLORIDE 1 MG: 1 INJECTION, SOLUTION INTRAMUSCULAR; INTRAVENOUS at 01:03

## 2017-03-28 RX ADMIN — FENTANYL CITRATE 50 MCG: 50 INJECTION, SOLUTION INTRAMUSCULAR; INTRAVENOUS at 12:03

## 2017-03-28 RX ADMIN — SODIUM CHLORIDE, SODIUM LACTATE, POTASSIUM CHLORIDE, AND CALCIUM CHLORIDE: 600; 310; 30; 20 INJECTION, SOLUTION INTRAVENOUS at 11:03

## 2017-03-28 NOTE — OP NOTE
[facility]  Operative Note    SUMMARY     Date of Procedure: 3/28/2017     Procedure: Procedure(s) (LRB):  ABLATION-ENDOMETRIAL-THERMAL (N/A)  [id number] (N/A)       Surgeon(s) and Role:     * Nguyễn Barbosa MD - Primary    Assisting Surgeon: None    Pre-Operative Diagnosis: Excessive or frequent menstruation [N92.0]    Post-Operative Diagnosis: Post-Op Diagnosis Codes:     * Excessive or frequent menstruation [N92.0]    Anesthesia: General    Technical Procedures Used: Hysteroscopy , Novasure ablation    Description of the Findings of the Procedure: Patient was taken to the operating room where general anesthesia was performed and found to be adequate. She was prepped and draped in the normal sterile fashion in the dorsal lithotomy position in kenan stirrups. Bladder was drained with straight catheter. The Erin dilators were used to dilate to #8. The hysteroscope was introduced and normal endometrium and tubal ostia were visualized. The hysteroscope was removed. The total sound  length was 9 cm and cervical length 4 cm. Total uterine length was 5cm and The width was 4.2 cm. The Novasure device was tested then activated in the usual fashion. After the procedure was complete the device was removed. The patient tolerated procedure well. Sponge lap and needle counts were correct x 2.    Significant Surgical Tasks Conducted by the Assistant(s), if Applicable:     Complications: No    Estimated Blood Loss (EBL): * No values recorded between 3/28/2017 12:31 PM and 3/28/2017 12:50 PM *           Implants: * No implants in log *    Specimens:   Specimen     None                  Condition: Good    Disposition: PACU - hemodynamically stable.    Attestation: I was present and scrubbed for the entire procedure.

## 2017-03-28 NOTE — DISCHARGE INSTRUCTIONS
Home Care Instruction D&C Hysteroscopy             ACTIVITY LEVEL:  If you received sedation and/or an anesthetic, you may feel sleepy for several hours. Rest until you feel more  awake. Gradually resume your normal activities.    DIET:  At home, continue with liquids. If there is no nausea, you may eat a soft diet and gradually resume a regular diet.    BATHING:  You may shower in one day.    CARE:  You can expect watery or bloody vaginal discharge for several days. Do not use tampons, please only use pads. Sexual activity is restricted as advised by your doctor.    MEDICATIONS:  You will receive instructions for any pain and/or antibiotic prescriptions. Pain medication should be taken only if needed and as directed. Antibiotics, if ordered, should be taken as directed until the entire prescription is completed.    ADDITIONAL INFORMATION:  __________________________________________________________________________________________  WHEN TO CALL THE DOCTOR:   For any heavy vaginal bleeding   Fever over 101°F (38.4°C)   Any lower abdominal pain not relieved by the pain mediation.    RETURN APPOINTMENT:  April 27, 2017 at 2 pm at   [address]  [facility] [id number] [phone]     __________________________________________________________________________________________  FOR EMERGENCIES:  If any unusual problems or difficulties occur, contact Dr. Barbosa or the resident at [phone].      Anesthesia: After Your Surgery  Youve just had surgery. During surgery, you received medication called anesthesia to keep you comfortable and pain-free. After surgery, you may experience some pain or nausea. This is common. Here are some tips for feeling better and recovering after surgery.    Going home  Your doctor or nurse will show you how to take care of yourself when you go home. He or she will also answer your questions. Have an adult family member or friend drive you home. For the first 24 hours after  your surgery:  · Do not drive or use heavy equipment.  · Do not make important decisions or sign legal documents.  · Avoid alcohol.  · Have someone stay with you, if needed. He or she can watch for problems and help keep you safe.  Be sure to keep all follow-up appointments with your doctor. And rest after your procedure for as long as your doctor tells you to.    Coping with pain  If you have pain after surgery, pain medication will help you feel better. Take it as directed, before pain becomes severe. Also, ask your doctor or pharmacist about other ways to control pain, such as with heat, ice, and relaxation. And follow any other instructions your surgeon or nurse gives you.    Tips for taking pain medication  To get the best relief possible, remember these points:  · Pain medications can upset your stomach. Taking them with a little food may help.  · Most pain relievers taken by mouth need at least 20 to 30 minutes to take effect.  · Taking medication on a schedule can help you remember to take it. Try to time your medication so that you can take it before beginning an activity, such as dressing, walking, or sitting down for dinner.  · Constipation is a common side effect of pain medications. Contact your doctor before taking any medications like laxatives or stool softeners to help relieve constipation. Also ask about any dietary restrictions, because drinking lots of fluids and eating foods like fruits and vegetables that are high in fiber can also help. Remember, dont take laxatives unless your surgeon has prescribed them.  · Mixing alcohol and pain medication can cause dizziness and slow your breathing. It can even be fatal. Dont drink alcohol while taking pain medication.  · Pain medication can slow your reflexes. Dont drive or operate machinery while taking pain medication.  If your health care provider tells you to take acetaminophen to help relieve your pain, ask him or her how much you are supposed to  take each day. (Acetaminophen is the generic name for Tylenol and other brand-name pain relievers.) Acetaminophen or other pain relievers may interact with your prescription medicines or other over-the-counter (OTC) drugs. Some prescription medications contain acetaminophen along with other active ingredients. Using both prescription and OTC acetaminophen for pain can cause you to overdose. The FDA recommends that you read the labels on your OTC medications carefully. This will help you to clearly understand the list of active ingredients, dosing instructions, and any warnings. It may also help you avoid taking too much acetaminophen. If you have questions or don't understand the information, ask your pharmacist or health care provider to explain it to you before you take the OTC medication.    Managing nausea  Some people have an upset stomach after surgery. This is often due to anesthesia, pain, pain medications, or the stress of surgery. The following tips will help you manage nausea and get good nutrition as you recover. If you were on a special diet before surgery, ask your doctor if you should follow it during recovery. These tips may help:  · Dont push yourself to eat. Your body will tell you when to eat and how much.  · Start off with clear liquids and soup. They are easier to digest.  · Progress to semi-solid foods (mashed potatoes, applesauce, and gelatin) as you feel ready.  · Slowly move to solid foods. Dont eat fatty, rich, or spicy foods at first.  · Dont force yourself to have three large meals a day. Instead, eat smaller amounts more often.  · Take pain medications with a small amount of solid food, such as crackers or toast to avoid nausea.      Call your surgeon if  · You still have pain an hour after taking medication (it may not be strong enough).  · You feel too sleepy, dizzy, or groggy (medication may be too strong).  · You have side effects like nausea, vomiting, or skin changes (rash,  itching, or hives).

## 2017-03-28 NOTE — PLAN OF CARE
Rubina MUSA Gonzalez has met all discharge criteria from Phase II. Vital Signs are stable, ambulating  without difficulty.Pain is now under control and tolerable for the pt. Pain score 2 at this time.  Discharge instructions given, patient verbalized understanding. Discharged from facility via wheelchair in stable condition.

## 2017-03-28 NOTE — PLAN OF CARE
Patient still cramping.  Spoke to anesthesia and IV Ibuprofen was administered.  Patient stated that there was also nausea present.  Zofran administered IVP.  Followed up with patient - states that pain is at a 2 and nausea relieved.  Ready to D/C home

## 2017-03-28 NOTE — ANESTHESIA POSTPROCEDURE EVALUATION
"Anesthesia Post Evaluation    Patient: Rubina Gonzalez    Procedure(s) Performed: Procedure(s) (LRB):  ABLATION-ENDOMETRIAL-THERMAL (N/A)  [id number] (N/A)    Final Anesthesia Type: general  Patient location during evaluation: PACU  Patient participation: Yes- Able to Participate  Level of consciousness: awake and alert  Post-procedure vital signs: reviewed and stable  Pain management: adequate  Airway patency: patent  PONV status at discharge: No PONV  Anesthetic complications: no      Cardiovascular status: blood pressure returned to baseline  Respiratory status: unassisted  Hydration status: euvolemic  Follow-up not needed.        Visit Vitals    /78 (BP Location: Right arm, Patient Position: Lying, BP Method: Automatic)    Pulse 62    Temp 36.3 °C (97.4 °F) (Oral)    Resp 16    Ht 5' 4" (1.626 m)    Wt 104.3 kg (230 lb)    LMP 03/13/2017    SpO2 (!) 94%    Breastfeeding No    BMI 39.48 kg/m2       Pain/Adela Score: Pain Assessment Performed: Yes (3/28/2017  2:55 PM)  Presence of Pain: complains of pain/discomfort (3/28/2017  2:55 PM)  Pain Rating Prior to Med Admin: 6 (3/28/2017  2:32 PM)  Pain Rating Post Med Admin: 5 (3/28/2017  2:55 PM)  Adela Score: 10 (3/28/2017  2:55 PM)      "

## 2017-03-28 NOTE — H&P
CC: preop for moenorrhagia    Rubina Gonzalez is a 36 y.o. female  .  She is established.    Patient having 7 day cycles passing heavy clots and having to change her pad or tampon hourly on the worst day. We discussed endometrial ablation last year but her mom was in during chemotherapy and the patient could not take time off. Patient now interested in proceeding with an ablation and here for preop   Patient reports her cycles are lasting 7 days and she is changing her pad hourly for the first 3 days. She is not a good candidate for birth control as her mother had breast cancer and patient is very nervous and concerned about birth control pills.   Her  has had a vasectomy for contraception    Past Medical History:   Diagnosis Date    Abnormal Pap smear of cervix 2015    ASCUS / Hpv Neg    Depression     Family history of breast cancer in mother     dx. age 48    Galactorrhea     Insomnia     Migraine     topomax    Panic attacks        Past Surgical History:   Procedure Laterality Date    CHOLECYSTECTOMY         OB History    Para Term  AB SAB TAB Ectopic Multiple Living   4 4 3 1      4      # Outcome Date GA Lbr Jero/2nd Weight Sex Delivery Anes PTL Lv   4 Term  39w0d  3.6 kg (7 lb 15 oz) F Vag-Spont EPI  Y   3 Term  38w0d  3.43 kg (7 lb 9 oz) F Vag-Spont EPI  Y   2 Term  37w0d  3.572 kg (7 lb 14 oz) F Vag-Spont EPI  Y   1   36w0d  3.402 kg (7 lb 8 oz) F Vag-Spont EPI  Y      Obstetric Comments   Menarche 13       Family History   Problem Relation Age of Onset    Colon cancer Paternal Grandmother     Cancer Paternal Grandmother     Breast cancer Paternal Grandmother     Colon cancer Maternal Grandfather     Cancer Maternal Grandfather     Hypertension Father     Breast cancer Mother 57    Hypertension Mother     Cancer Mother     Breast cancer Paternal Aunt     Cancer Paternal Aunt     Ovarian cancer Neg Hx        Social History  "  Substance Use Topics    Smoking status: Never Smoker    Smokeless tobacco: None    Alcohol use Yes      Comment: Socially       /72  Pulse 72  Resp 12  Ht 5' 4" (1.626 m)  Wt 106.8 kg (235 lb 9 oz)  LMP 03/13/2017 (Exact Date)  BMI 40.43 kg/m2    ROS:  GENERAL: Denies weight gain or weight loss. Feeling well overall.   SKIN: Denies rash or lesions.   HEAD: Denies head injury or headache.   NODES: Denies enlarged lymph nodes.   CHEST: Denies chest pain or shortness of breath.   CARDIOVASCULAR: Denies palpitations or left sided chest pain.   ABDOMEN: No abdominal pain, constipation, diarrhea, nausea, vomiting or rectal bleeding.   URINARY: No frequency, dysuria, hematuria, or burning on urination.  REPRODUCTIVE: See HPI.   BREASTS: denies pain, lumps, or nipple discharge.   HEMATOLOGIC: No easy bruisability or excessive bleeding.  MUSCULOSKELETAL: Denies joint pain or swelling.   NEUROLOGIC: Denies syncope or weakness.   PSYCHIATRIC: Denies depression, anxiety or mood swings.    Physical Exam:    APPEARANCE: Well nourished, well developed, in no acute distress.  AFFECT: WNL, alert and oriented x 3  SKIN: No acne or hirsutism  NECK: Neck symmetric without masses or thyromegaly  CVS RRR  Lungs CTAB  ABDOMEN: Soft.  No tenderness or masses.  No hepatosplenomegaly.  No hernias.  PELVIC: deferred  EXTREMITIES: No edema.     Pelvic u/s:  Findings: The uterus measures 8.8 x 4.6 x 5.6 cm. Uterine parenchyma is homogeneous without evidence for masses. The endometrial stripe is normal in thickness and measures 0.6 cm.  The right ovary is normal in size and measures 3.3 x 3.4 x 2.0 cm. The left ovary is normal in size and measures 3.3 x 3.1 x 2.5 cm. Follicles are seen in both ovaries. Arterial and venous flow are preserved bilaterally. No free fluid is seen.      ASSESSMENT AND PLAN      Menorrhagia with regular cycle  Menorrhagia passing large clots and having to change her pad hourly for 3 days out of her 7 " day period.  Patient desires to proceed with endometrial ablation.  Risks benefits alternatives extensively discussed with patient.  Patient understands this is a 95% success rate and only 40% amenorrhea rate.  Ultrasound performed prior to surgery which was normal.    Consents were signed and charted.  Patient desires to proceed with surgery.  We'll proceed with hysteroscopy D&C and NovaSure endometrial ablation.    Other orders  -     eszopiclone (LUNESTA) 3 mg Tab; Take 1 tablet (3 mg total) by mouth nightly.

## 2017-03-28 NOTE — OR NURSING
Pt less tearful. Able to fall off to sleep. States pain 2 and tolerable. No change from previous assessment. Prepared for transfer to acu.

## 2017-03-28 NOTE — TRANSFER OF CARE
"Anesthesia Transfer of Care Note    Patient: Rubina Gonzalez    Procedure(s) Performed: Procedure(s) (LRB):  ABLATION-ENDOMETRIAL-THERMAL (N/A)  [id number] (N/A)    Patient location: PACU    Anesthesia Type: general    Transport from OR: Transported from OR on 2-3 L/min O2 by NC with adequate spontaneous ventilation. Continuous SpO2 monitoring in transport    Post pain: adequate analgesia    Post assessment: no apparent anesthetic complications and tolerated procedure well    Post vital signs: stable    Level of consciousness: awake, alert and oriented    Nausea/Vomiting: no nausea/vomiting    Complications: none          Last vitals:   Visit Vitals    BP (!) 144/93 (BP Location: Right arm, Patient Position: Lying, BP Method: Automatic)    Pulse 71    Temp 36.8 °C (98.3 °F) (Oral)    Ht 5' 4" (1.626 m)    Wt 104.3 kg (230 lb)    LMP 03/13/2017    SpO2 100%    Breastfeeding No    BMI 39.48 kg/m2     "

## 2017-03-28 NOTE — TELEPHONE ENCOUNTER
Spoke to patient biopsy results given.  Benign fibrocystic changes  Patient to see Dr. Fish on Thursday

## 2017-03-28 NOTE — DISCHARGE SUMMARY
[facility]  Discharge Summary  Gynecology      Admit Date: 3/28/2017    Discharge Date and Time: 3/28/2017    Attending Physician: Nguyễn Barbosa MD     Discharge Provider: Nguyễn Barbosa    Reason for Admission: Hysteroscopy  Novasure ablation    Procedures Performed: Procedure(s) (LRB):  ABLATION-ENDOMETRIAL-THERMAL (N/A)  [id number] (N/A)    Hospital Course (synopsis of major diagnoses, care, treatment, and services provided during the course of the hospital stay): Patient was admitted for surgery. Following surgery she was transferred to the floor and underwent routine postoperative care. She tolerated po, ambulated without difficulty, and pain was well controlled. She remained afebrile throughout hospital course and her labs were stable. She was discharged to home in stable condition.      Consults: none    Significant Diagnostic Studies: Labs: CBC No results found for: WBC, HGB, HCT, MCV, PLT    Final Diagnoses:   Principal Problem: Menorrhagia with regular cycle   Secondary Diagnoses:   Active Hospital Problems    Diagnosis  POA    *Menorrhagia with regular cycle [N92.0]  Yes    Menorrhagia [N92.0]  Yes      Resolved Hospital Problems    Diagnosis Date Resolved POA   No resolved problems to display.       Discharged Condition: stable    Disposition: Home    Follow Up/Patient Instructions: 2 weeks    Medications:  Reconciled Home Medications: Current Discharge Medication List      CONTINUE these medications which have NOT CHANGED    Details   alprazolam (XANAX) 1 MG tablet Take 1 tablet (1 mg total) by mouth nightly as needed for Insomnia.  Qty: 30 tablet, Refills: 0    Associated Diagnoses: Panic attacks      escitalopram oxalate (LEXAPRO) 20 MG tablet Take 1 tablet (20 mg total) by mouth once daily.  Qty: 30 tablet, Refills: 11    Associated Diagnoses: Anxiety      eszopiclone (LUNESTA) 3 mg Tab Take 1 tablet (3 mg total) by mouth nightly.  Qty: 30 tablet, Refills: 3       topiramate (TOPAMAX) 25 MG tablet TK 1 T PO D  Refills: 3      ibuprofen (ADVIL,MOTRIN) 800 MG tablet Take 1 tablet (800 mg total) by mouth every 8 (eight) hours as needed for Pain.  Qty: 30 tablet, Refills: 0    Associated Diagnoses: Pre-op examination      multivitamin with minerals tablet Take 1 tablet by mouth once daily. ( MEDI WEIGHT LOSS )      ondansetron (ZOFRAN) 8 MG tablet Take 1 tablet (8 mg total) by mouth every 8 (eight) hours as needed for Nausea.  Qty: 15 tablet, Refills: 0    Associated Diagnoses: Pre-op examination      oxycodone-acetaminophen (PERCOCET) 7.5-325 mg per tablet Take 1 tablet by mouth every 4 (four) hours as needed for Pain.  Qty: 15 tablet, Refills: 0    Associated Diagnoses: Pre-op examination             Discharge Procedure Orders  Diet general     Activity as tolerated     Call MD for:  temperature >100.4     Call MD for:  persistent nausea and vomiting or diarrhea     Call MD for:  severe uncontrolled pain     Call MD for:  redness, tenderness, or signs of infection (pain, swelling, redness, odor or green/yellow discharge around incision site)     No dressing needed       Follow-up Information     Follow up In 4 weeks.

## 2017-03-28 NOTE — INTERVAL H&P NOTE
The patient has been examined and the H&P has been reviewed:    I concur with the findings and no changes have occurred since H&P was written.    Anesthesia/Surgery risks, benefits and alternative options discussed and understood by patient/family.          Active Hospital Problems    Diagnosis  POA    Menorrhagia [N92.0]  Yes      Resolved Hospital Problems    Diagnosis Date Resolved POA   No resolved problems to display.

## 2017-03-29 ENCOUNTER — TELEPHONE (OUTPATIENT)
Dept: OBSTETRICS AND GYNECOLOGY | Facility: CLINIC | Age: 37
End: 2017-03-29

## 2017-03-29 RX ORDER — ONDANSETRON HYDROCHLORIDE 8 MG/1
8 TABLET, FILM COATED ORAL EVERY 6 HOURS PRN
Qty: 30 TABLET | Refills: 4 | Status: SHIPPED | OUTPATIENT
Start: 2017-03-29 | End: 2017-04-27

## 2017-03-29 NOTE — TELEPHONE ENCOUNTER
I telephoned in the Zofran ODT 8mg @6hr #30 refills 0 to [facility].  I must have pended the PO Zofran instead of the ODT for Dr. Quintero to sign but that wasn't working for her.   I didn't want her to have to wait any longer.  This is just FYI.  I did tell her if this didn't help to call for an appt.

## 2017-03-29 NOTE — TELEPHONE ENCOUNTER
Bone pt, had ablation yesterday and has been nauseated since the surgery. Pt has been taking zofran but it comes back up.

## 2017-03-29 NOTE — TELEPHONE ENCOUNTER
Pt states she took a Zofran ODT, ate soup but then threw it up a couple hours later.  She has thrown up 3 times since yesterday evening, unable to tolerate food.  Scheduled appt with Dr. Barbosa tomorrow at 0800

## 2017-03-29 NOTE — TELEPHONE ENCOUNTER
Pt was prescribed Zofran PO after her ablation yesterday.  She is not able to keep it down.      Zofran ODT pended

## 2017-03-30 ENCOUNTER — TELEPHONE (OUTPATIENT)
Dept: OBSTETRICS AND GYNECOLOGY | Facility: CLINIC | Age: 37
End: 2017-03-30

## 2017-03-30 NOTE — TELEPHONE ENCOUNTER
Dr Barbosa pt calling, pt would like her Bx results sent to Dr Serrano cause he needs those results to move further. Pt said she doesn't see them on the portal and  went to  something from them but no results. Please call pt to let her know if dr barbosa has results to sent them to dr serrano's office. Py # [phone]

## 2017-03-30 NOTE — TELEPHONE ENCOUNTER
Pt. Notified, I will be faxing pathology report to 's office.  She is feeling better since her sx. This wk.

## 2017-04-04 ENCOUNTER — TELEPHONE (OUTPATIENT)
Dept: OBSTETRICS AND GYNECOLOGY | Facility: CLINIC | Age: 37
End: 2017-04-04

## 2017-04-04 NOTE — TELEPHONE ENCOUNTER
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  We sent Dr. Quarles the pathology report last week.  Did he get it?

## 2017-04-04 NOTE — TELEPHONE ENCOUNTER
"Dr Barbosa pt calling, pt had a Bx of the breast duct about a week in a half ago and now there is blood coming from the nipple.Pt states she is "freaking out". Pt # [phone]  "

## 2017-04-04 NOTE — TELEPHONE ENCOUNTER
Pt advised.     She has not heard back from Dr. Quarles so she does not know if he has received the report.  I instructed her to keep us posted.

## 2017-04-04 NOTE — TELEPHONE ENCOUNTER
Pt had a breast biposy on 3/24.  Saturday started having bloody drainage.  She is waiting to hear back from Dr. Quarles.  She is due to possibly have the duct removed and the tumor after Dr. Quarles sees the pathology report.

## 2017-04-27 ENCOUNTER — OFFICE VISIT (OUTPATIENT)
Dept: OBSTETRICS AND GYNECOLOGY | Facility: CLINIC | Age: 37
End: 2017-04-27
Payer: COMMERCIAL

## 2017-04-27 VITALS
DIASTOLIC BLOOD PRESSURE: 68 MMHG | SYSTOLIC BLOOD PRESSURE: 108 MMHG | BODY MASS INDEX: 41.15 KG/M2 | HEIGHT: 64 IN | WEIGHT: 241.06 LBS

## 2017-04-27 DIAGNOSIS — N39.0 URINARY TRACT INFECTION WITHOUT HEMATURIA, SITE UNSPECIFIED: ICD-10-CM

## 2017-04-27 DIAGNOSIS — Z48.89 POSTOPERATIVE VISIT: Primary | ICD-10-CM

## 2017-04-27 PROCEDURE — 99999 PR PBB SHADOW E&M-EST. PATIENT-LVL III: CPT | Mod: PBBFAC,,, | Performed by: OBSTETRICS & GYNECOLOGY

## 2017-04-27 PROCEDURE — 99213 OFFICE O/P EST LOW 20 MIN: CPT | Mod: S$GLB,,, | Performed by: OBSTETRICS & GYNECOLOGY

## 2017-04-27 PROCEDURE — 87086 URINE CULTURE/COLONY COUNT: CPT

## 2017-04-27 NOTE — PROGRESS NOTES
Subjective:       Patient ID: Rubina Gonzalez is a 37 y.o. female.    Chief Complaint:  Post-op Evaluation (4w 2d PO Endometrial Ablation on 3-28-17)      History of Present Illness  Patient doing well post-ablation.  Has not had a normal period yet.      GYN & OB History  Patient's last menstrual period was 2017 (exact date).   Date of Last Pap: 2017    OB History    Para Term  AB SAB TAB Ectopic Multiple Living   4 4 3 1      4      # Outcome Date GA Lbr Jero/2nd Weight Sex Delivery Anes PTL Lv   4 Term  39w0d  3.6 kg (7 lb 15 oz) F Vag-Spont EPI  Y   3 Term  38w0d  3.43 kg (7 lb 9 oz) F Vag-Spont EPI  Y   2 Term  37w0d  3.572 kg (7 lb 14 oz) F Vag-Spont EPI  Y   1   36w0d  3.402 kg (7 lb 8 oz) F Vag-Spont EPI  Y      Obstetric Comments   Menarche 13       Review of Systems  Review of Systems   Constitutional: Negative for fatigue and unexpected weight change.   Respiratory: Negative for shortness of breath.    Cardiovascular: Negative for chest pain.   Gastrointestinal: Negative for abdominal pain, constipation, diarrhea, nausea and vomiting.   Genitourinary: Negative for dysuria.   Musculoskeletal: Negative for back pain.   Skin: Negative for rash.   Neurological: Negative for headaches.   Hematological: Does not bruise/bleed easily.   Psychiatric/Behavioral: Negative for behavioral problems.        Objective:   Physical Exam:   Constitutional: She is oriented to person, place, and time. She appears well-developed and well-nourished.                           Neurological: She is alert and oriented to person, place, and time.    Skin: Skin is warm.         Assessment/ Plan:          Rubina was seen today for post-op evaluation.    Diagnoses and all orders for this visit:    Postoperative visit     post-ablation doing well.  Will continue to observe.  Overall patient doing okay with her anxiety secondary to her recent mother's death.  She will continue Lexapro daily  and Xanax and Lunesta as needed.    Return for annual.      Health Maintenance       Date Due Completion Date    Lipid Panel 1981 ---    TETANUS VACCINE 4/12/1999 ---    Influenza Vaccine 8/1/2016 ---    Pap Smear 12/12/2019 12/12/2016

## 2017-04-27 NOTE — MR AVS SNAPSHOT
[facility]  [address]  [place] [address]  Phone: [phone]  Fax: [phone]                  Rubina Gonzalez   2017 2:00 PM   Office Visit    Description:  Female : 1980   Provider:  Nguyễn Barbosa MD   Department:  [facility]           Reason for Visit     Post-op Evaluation                To Do List           Goals (5 Years of Data)     None      [facility]     [facility] Care [facility] -  Assistance  Unless otherwise directed by your provider, please contact [facility], our nurse care line that is available for  assistance.     Registered nurses in the [facility] provide: appointment scheduling, clinical advisement, health education, and other advisory services.  Call: [phone] (toll free)               Medications           STOP taking these medications     oxycodone-acetaminophen (PERCOCET) 7.5-325 mg per tablet Take 1 tablet by mouth every 4 (four) hours as needed for Pain.    ondansetron (ZOFRAN) 8 MG tablet Take 1 tablet (8 mg total) by mouth every 6 (six) hours as needed for Nausea.    ondansetron (ZOFRAN) 8 MG tablet Take 1 tablet (8 mg total) by mouth every 8 (eight) hours as needed for Nausea.    ibuprofen (ADVIL,MOTRIN) 800 MG tablet Take 1 tablet (800 mg total) by mouth every 8 (eight) hours as needed for Pain.           Verify that the below list of medications is an accurate representation of the medications you are currently taking.  If none reported, the list may be blank. If incorrect, please contact your healthcare provider. Carry this list with you in case of emergency.           Current Medications     alprazolam (XANAX) 1 MG tablet Take 1 tablet (1 mg total) by mouth nightly as needed for Insomnia.    escitalopram oxalate (LEXAPRO) 20 MG tablet Take 1 tablet (20 mg total) by mouth once daily.    eszopiclone (LUNESTA) 3 mg Tab Take 1 tablet (3 mg total) by mouth nightly.     "multivitamin with minerals tablet Take 1 tablet by mouth once daily. ( MEDI WEIGHT LOSS )    topiramate (TOPAMAX) 25 MG tablet TK 1 T PO D           Clinical Reference Information           Your Vitals Were     BP Height Weight Last Period BMI    108/68 5' 4" (1.626 m) 109.4 kg (241 lb 1.2 oz) 03/13/2017 (Exact Date) 41.38 kg/m2      Blood Pressure          Most Recent Value    BP  108/68      Allergies as of 4/27/2017     Ciprofloxacin    Cephalexin      Immunizations Administered on Date of Encounter - 4/27/2017     None      Language Assistance Services     ATTENTION: Language assistance services are available, free of charge. Please call [phone].      ATENCIÓN: Si habla ester, tiene a pack disposición servicios gratuitos de asistencia lingüística. Llame al [phone].     DOUG Ý: N?u b?n nói Ti?ng Vi?t, có các d?ch v? h? tr? ngôn ng? mi?n phí dành cho b?n. G?i s? [phone].         [facility] complies with applicable Federal civil rights laws and does not discriminate on the basis of race, color, national origin, age, disability, or sex.        "

## 2017-04-29 LAB — BACTERIA UR CULT: NO GROWTH

## 2017-05-09 DIAGNOSIS — F41.0 PANIC ATTACKS: ICD-10-CM

## 2017-05-10 RX ORDER — ALPRAZOLAM 1 MG/1
TABLET ORAL
Qty: 30 TABLET | Refills: 0 | Status: SHIPPED | OUTPATIENT
Start: 2017-05-10 | End: 2017-06-24 | Stop reason: SDUPTHER

## 2017-06-22 ENCOUNTER — TELEPHONE (OUTPATIENT)
Dept: OBSTETRICS AND GYNECOLOGY | Facility: CLINIC | Age: 37
End: 2017-06-22

## 2017-06-22 DIAGNOSIS — R10.2 PELVIC PAIN IN FEMALE: Primary | ICD-10-CM

## 2017-06-22 NOTE — TELEPHONE ENCOUNTER
Bone pt, had in March, having sporadic abdominal pain and blood in urine but went to urologist and he said there is no infection.

## 2017-06-22 NOTE — TELEPHONE ENCOUNTER
Pt is having a pain that started on left side wrapping around to the front of pelvis.  Went to urgent care because she thought it was a kidney infection and they ruled out any infection but sent her to a urologist to rule out kidney stones.  The urologist is sending her for a CT today.  She says it feels like when she had an ovarian cyst.  the pain is more severe went she puts pressure in the abdomen.  She is ovulating today because the discharge is different.  She is asking to come in tomorrow because she leaves for a cruise Saturday and doesn't want to wait until after.    Scheduled US and appt with Joy tomorrow.  Aware of US prep

## 2017-06-23 ENCOUNTER — OFFICE VISIT (OUTPATIENT)
Dept: OBSTETRICS AND GYNECOLOGY | Facility: CLINIC | Age: 37
End: 2017-06-23
Payer: COMMERCIAL

## 2017-06-23 VITALS — HEIGHT: 64 IN | DIASTOLIC BLOOD PRESSURE: 78 MMHG | SYSTOLIC BLOOD PRESSURE: 112 MMHG

## 2017-06-23 DIAGNOSIS — R10.2 PELVIC PAIN IN FEMALE: ICD-10-CM

## 2017-06-23 DIAGNOSIS — N89.8 VAGINAL DISCHARGE: Primary | ICD-10-CM

## 2017-06-23 LAB
CANDIDA RRNA VAG QL PROBE: NEGATIVE
G VAGINALIS RRNA GENITAL QL PROBE: POSITIVE
T VAGINALIS RRNA GENITAL QL PROBE: NEGATIVE

## 2017-06-23 PROCEDURE — 99999 PR PBB SHADOW E&M-EST. PATIENT-LVL III: CPT | Mod: PBBFAC,,, | Performed by: NURSE PRACTITIONER

## 2017-06-23 PROCEDURE — 87480 CANDIDA DNA DIR PROBE: CPT

## 2017-06-23 PROCEDURE — 99213 OFFICE O/P EST LOW 20 MIN: CPT | Mod: S$GLB,,, | Performed by: NURSE PRACTITIONER

## 2017-06-23 RX ORDER — OXYCODONE AND ACETAMINOPHEN 5; 325 MG/1; MG/1
TABLET ORAL
Refills: 0 | COMMUNITY
Start: 2017-06-20 | End: 2017-11-02

## 2017-06-23 RX ORDER — TAMSULOSIN HYDROCHLORIDE 0.4 MG/1
CAPSULE ORAL
Refills: 3 | COMMUNITY
Start: 2017-06-20 | End: 2017-11-02

## 2017-06-23 RX ORDER — ONDANSETRON 8 MG/1
TABLET, ORALLY DISINTEGRATING ORAL
COMMUNITY
Start: 2017-03-29 | End: 2017-11-02

## 2017-06-23 RX ORDER — ONDANSETRON 4 MG/1
TABLET, FILM COATED ORAL
Refills: 0 | COMMUNITY
Start: 2017-06-20 | End: 2017-06-23

## 2017-06-23 RX ORDER — PHENTERMINE HYDROCHLORIDE 37.5 MG/1
TABLET ORAL
Refills: 0 | COMMUNITY
Start: 2017-06-06 | End: 2017-11-02

## 2017-06-23 RX ORDER — METHYLPREDNISOLONE 4 MG/1
TABLET ORAL
COMMUNITY
Start: 2017-04-13 | End: 2017-06-23

## 2017-06-23 RX ORDER — HYDROCODONE BITARTRATE AND ACETAMINOPHEN 5; 325 MG/1; MG/1
TABLET ORAL
Refills: 0 | COMMUNITY
Start: 2017-06-19 | End: 2017-06-23

## 2017-06-23 RX ORDER — SULFAMETHOXAZOLE AND TRIMETHOPRIM 800; 160 MG/1; MG/1
TABLET ORAL
COMMUNITY
Start: 2017-04-13 | End: 2017-06-23

## 2017-06-24 DIAGNOSIS — F41.0 PANIC ATTACKS: ICD-10-CM

## 2017-06-25 RX ORDER — ALPRAZOLAM 1 MG/1
TABLET ORAL
Qty: 30 TABLET | Refills: 0 | Status: SHIPPED | OUTPATIENT
Start: 2017-06-25 | End: 2017-09-14 | Stop reason: SDUPTHER

## 2017-06-26 RX ORDER — TINIDAZOLE 500 MG/1
TABLET ORAL
Qty: 8 TABLET | Refills: 0 | Status: SHIPPED | OUTPATIENT
Start: 2017-06-26 | End: 2017-11-02

## 2017-06-26 NOTE — PROGRESS NOTES
Chief Complaint: Pelvic Pain     HPI:      (Bone pt)    Rubina Gonzalez is a 37 y.o.  here for evaluation of pelvic pain. The pain is described as sharp, and is 5/10 in intensity. Pain is located in the LLQ and left flank area without radiation. Onset was sudden occurring 3 days ago. Symptoms have been unchanged since. Aggravating factors: pressure. Alleviating factors: none. Associated symptoms: none. The patient denies constipation, diarrhea, dysuria, fever, frequency, hematuria, nausea and vomiting. Risk factors for pelvic/abdominal pain include prior pelvic surgery and prior ovarian cyst rupture. Patient denies vomiting, diarrhea and difficulty breathing history of abuse. No LMP recorded. Patient has had an ablation.    Past Medical History:   Diagnosis Date    Abnormal Pap smear of cervix 2015    ASCUS / Hpv Neg    Depression     Family history of breast cancer in mother     dx. age 48    Galactorrhea     Insomnia     Irregular menstrual bleeding     Migraine     topomax    Panic attacks      Past Surgical History:   Procedure Laterality Date    CHOLECYSTECTOMY  2006    ENDOMETRIAL ABLATION  2017    Dr Nguyễn Barbosa     Social History     Social History    Marital status:      Spouse name: N/A    Number of children: N/A    Years of education: N/A     Social History Main Topics    Smoking status: Never Smoker    Smokeless tobacco: None    Alcohol use Yes      Comment: Social    Drug use: No    Sexual activity: Yes     Partners: Male     Birth control/ protection: Partner-Vasectomy      Comment: :  Ha     Other Topics Concern    None     Social History Narrative    Four girls,        ROS:     GENERAL: Denies weight gain or weight loss. Feeling well overall.   SKIN: Denies rash or lesions.   ABDOMEN: Denies abdominal pain, constipation, diarrhea, nausea, vomiting, change in appetite or rectal bleeding.   URINARY: Denies frequency, dysuria, hematuria.  GYN: See  "HPI.    Physical Exam:      PHYSICAL EXAM:   Vitals:    06/23/17 0936   BP: 112/78   Height: 5' 4" (1.626 m)   PainSc: 0-No pain     There is no height or weight on file to calculate BMI.    General: No acute distress, alert and engaged  Abdomen: Soft, mild tenderness noted to LLQ; no rebound tenderness or guarding noted. Abdomen non-distended, suprapubic tenderness absent.  No CVA tenderness noted.  Pelvis: External genitalia and urethra within normal limits. Vagina without lesions; small amount thick white discharge; no erythema, without ulcers.  Cervix without cervical motion tenderness, non-friable. Uterus normal in size and nontender. No adnexal masses or tenderness palpated.    Assessment/Plan:     Vaginal discharge  -     Vaginosis Screen by DNA Probe    Pelvic pain in female    *Reviewed CT of abdomen report (was done on 06/22/17), which show "calcifications and nonobstructing calyceal stones (range between 3-6 mm) within left kidney.  No hydroureter or hydronephrosis noted."  *(Patient has appointment with  (urologist that ordered CT abdomen) this afternoon.)    Will call patient with vaginal swab results.    Counseling:     Follow-up as needed.  "

## 2017-06-27 ENCOUNTER — TELEPHONE (OUTPATIENT)
Dept: OBSTETRICS AND GYNECOLOGY | Facility: CLINIC | Age: 37
End: 2017-06-27

## 2017-06-27 NOTE — TELEPHONE ENCOUNTER
----- Message from Joy Betancourt NP sent at 6/26/2017 12:30 PM CDT -----  Patient's AFFIRM swab was (+) for BV.    Please call her and let her know Rx sent for (Tinidazole x 2 days).  Remind patient she cannot drink while taking medication or for 3 days after completion of medication.      # Instruct patient to contact pharmacy to check on status of RX

## 2017-07-17 ENCOUNTER — TELEPHONE (OUTPATIENT)
Dept: OBSTETRICS AND GYNECOLOGY | Facility: CLINIC | Age: 37
End: 2017-07-17

## 2017-07-17 RX ORDER — FLUOXETINE HYDROCHLORIDE 20 MG/1
20 CAPSULE ORAL DAILY
Qty: 15 CAPSULE | Refills: 0 | Status: SHIPPED | OUTPATIENT
Start: 2017-07-17 | End: 2017-11-02

## 2017-07-17 NOTE — TELEPHONE ENCOUNTER
She can stop Lexapro and start/Switch to Prozac 20 mg daily for 2 weeks.  I sent and prescription.  This way she will not have any side effects once she stops Prozac.  When she is done with the 2 weeks' worth of Prozac she can stop completely

## 2017-07-21 ENCOUNTER — PATIENT MESSAGE (OUTPATIENT)
Dept: OBSTETRICS AND GYNECOLOGY | Facility: CLINIC | Age: 37
End: 2017-07-21

## 2017-07-21 RX ORDER — FLUCONAZOLE 150 MG/1
150 TABLET ORAL ONCE
Qty: 2 TABLET | Refills: 1 | Status: SHIPPED | OUTPATIENT
Start: 2017-07-21 | End: 2017-07-21

## 2017-07-24 ENCOUNTER — PATIENT MESSAGE (OUTPATIENT)
Dept: OBSTETRICS AND GYNECOLOGY | Facility: CLINIC | Age: 37
End: 2017-07-24

## 2017-08-21 ENCOUNTER — TELEPHONE (OUTPATIENT)
Dept: OBSTETRICS AND GYNECOLOGY | Facility: CLINIC | Age: 37
End: 2017-08-21

## 2017-08-21 DIAGNOSIS — Z80.3 FAMILY HISTORY OF BREAST CANCER IN MOTHER: ICD-10-CM

## 2017-08-21 DIAGNOSIS — N64.52 BREAST DISCHARGE: Primary | ICD-10-CM

## 2017-08-21 NOTE — TELEPHONE ENCOUNTER
Spoke w/ pt. & states she is trying not to overreact due to her mother recently passing away of breast cancer. However she is having pain in Right breast then she grabs breast when it happens & yellowish discharge expels.   Per  schedule Right breast ultrasound to evaluate. Pt. Needs to go to  since insurance changed. Has had last mammo & u/s at [facility]. Also has seen  this yr.

## 2017-08-27 ENCOUNTER — PATIENT MESSAGE (OUTPATIENT)
Dept: OBSTETRICS AND GYNECOLOGY | Facility: CLINIC | Age: 37
End: 2017-08-27

## 2017-08-27 DIAGNOSIS — R42 DIZZY: Primary | ICD-10-CM

## 2017-09-07 ENCOUNTER — LAB VISIT (OUTPATIENT)
Dept: LAB | Facility: HOSPITAL | Age: 37
End: 2017-09-07
Attending: OBSTETRICS & GYNECOLOGY
Payer: COMMERCIAL

## 2017-09-07 DIAGNOSIS — R42 DIZZY: ICD-10-CM

## 2017-09-07 LAB
ALBUMIN SERPL BCP-MCNC: 3.3 G/DL
ALP SERPL-CCNC: 89 U/L
ALT SERPL W/O P-5'-P-CCNC: 12 U/L
ANION GAP SERPL CALC-SCNC: 7 MMOL/L
AST SERPL-CCNC: 11 U/L
BASOPHILS # BLD AUTO: 0.02 K/UL
BASOPHILS NFR BLD: 0.2 %
BILIRUB SERPL-MCNC: 0.5 MG/DL
BUN SERPL-MCNC: 9 MG/DL
CALCIUM SERPL-MCNC: 9 MG/DL
CHLORIDE SERPL-SCNC: 105 MMOL/L
CO2 SERPL-SCNC: 26 MMOL/L
CREAT SERPL-MCNC: 0.8 MG/DL
DIFFERENTIAL METHOD: ABNORMAL
EOSINOPHIL # BLD AUTO: 0.2 K/UL
EOSINOPHIL NFR BLD: 2.5 %
ERYTHROCYTE [DISTWIDTH] IN BLOOD BY AUTOMATED COUNT: 13.9 %
EST. GFR  (AFRICAN AMERICAN): >60 ML/MIN/1.73 M^2
EST. GFR  (NON AFRICAN AMERICAN): >60 ML/MIN/1.73 M^2
GLUCOSE SERPL-MCNC: 89 MG/DL
HCT VFR BLD AUTO: 39.1 %
HGB BLD-MCNC: 13.3 G/DL
LYMPHOCYTES # BLD AUTO: 1.7 K/UL
LYMPHOCYTES NFR BLD: 18.2 %
MCH RBC QN AUTO: 29.4 PG
MCHC RBC AUTO-ENTMCNC: 34 G/DL
MCV RBC AUTO: 87 FL
MONOCYTES # BLD AUTO: 0.5 K/UL
MONOCYTES NFR BLD: 5 %
NEUTROPHILS # BLD AUTO: 6.9 K/UL
NEUTROPHILS NFR BLD: 73.8 %
PLATELET # BLD AUTO: 328 K/UL
PMV BLD AUTO: 11.4 FL
POTASSIUM SERPL-SCNC: 3.9 MMOL/L
PROT SERPL-MCNC: 7.3 G/DL
RBC # BLD AUTO: 4.52 M/UL
SODIUM SERPL-SCNC: 138 MMOL/L
TSH SERPL DL<=0.005 MIU/L-ACNC: 1.55 UIU/ML
WBC # BLD AUTO: 9.29 K/UL

## 2017-09-07 PROCEDURE — 84443 ASSAY THYROID STIM HORMONE: CPT

## 2017-09-07 PROCEDURE — 85025 COMPLETE CBC W/AUTO DIFF WBC: CPT

## 2017-09-07 PROCEDURE — 80053 COMPREHEN METABOLIC PANEL: CPT

## 2017-09-14 DIAGNOSIS — F41.0 PANIC ATTACKS: ICD-10-CM

## 2017-09-14 RX ORDER — ALPRAZOLAM 1 MG/1
1 TABLET ORAL 2 TIMES DAILY PRN
Qty: 30 TABLET | Refills: 0 | Status: SHIPPED | OUTPATIENT
Start: 2017-09-14 | End: 2017-11-10 | Stop reason: SDUPTHER

## 2017-10-03 ENCOUNTER — PATIENT MESSAGE (OUTPATIENT)
Dept: OBSTETRICS AND GYNECOLOGY | Facility: CLINIC | Age: 37
End: 2017-10-03

## 2017-10-03 DIAGNOSIS — B37.9 YEAST INFECTION: Primary | ICD-10-CM

## 2017-10-03 RX ORDER — FLUCONAZOLE 150 MG/1
150 TABLET ORAL DAILY
Qty: 1 TABLET | Refills: 0 | Status: SHIPPED | OUTPATIENT
Start: 2017-10-03 | End: 2017-10-04

## 2017-10-18 ENCOUNTER — TELEPHONE (OUTPATIENT)
Dept: OBSTETRICS AND GYNECOLOGY | Facility: CLINIC | Age: 37
End: 2017-10-18

## 2017-10-18 NOTE — TELEPHONE ENCOUNTER
Patient calling to verify that we have set up her follow up with Dr. Barbosa. Scheduled for follow up 11/2.

## 2017-10-18 NOTE — TELEPHONE ENCOUNTER
Pt said  orders a breast u/s for her and wants to follow up once to discuss the results. Pt is scheduled to have the breast u/s at  on 10/31 and is scheduled with  on 11/2 and wants to make sure we have the results for that appt.

## 2017-10-31 DIAGNOSIS — Z80.3 FAMILY HISTORY OF BREAST CANCER IN MOTHER: Primary | ICD-10-CM

## 2017-10-31 DIAGNOSIS — N63.0 BREAST LUMP IN FEMALE: ICD-10-CM

## 2017-11-02 ENCOUNTER — OFFICE VISIT (OUTPATIENT)
Dept: OBSTETRICS AND GYNECOLOGY | Facility: CLINIC | Age: 37
End: 2017-11-02
Payer: COMMERCIAL

## 2017-11-02 VITALS
DIASTOLIC BLOOD PRESSURE: 72 MMHG | WEIGHT: 246.94 LBS | HEIGHT: 64 IN | BODY MASS INDEX: 42.16 KG/M2 | SYSTOLIC BLOOD PRESSURE: 126 MMHG

## 2017-11-02 DIAGNOSIS — N64.3 GALACTORRHEA NOT ASSOCIATED WITH CHILDBIRTH: Primary | ICD-10-CM

## 2017-11-02 DIAGNOSIS — Z80.3 FAMILY HISTORY OF BREAST CANCER IN MOTHER: ICD-10-CM

## 2017-11-02 PROCEDURE — 99213 OFFICE O/P EST LOW 20 MIN: CPT | Mod: S$GLB,,, | Performed by: OBSTETRICS & GYNECOLOGY

## 2017-11-02 PROCEDURE — 99999 PR PBB SHADOW E&M-EST. PATIENT-LVL III: CPT | Mod: PBBFAC,,, | Performed by: OBSTETRICS & GYNECOLOGY

## 2017-11-10 DIAGNOSIS — F41.0 PANIC ATTACKS: ICD-10-CM

## 2017-11-14 RX ORDER — ALPRAZOLAM 1 MG/1
TABLET ORAL
Qty: 30 TABLET | Refills: 0 | Status: SHIPPED | OUTPATIENT
Start: 2017-11-14 | End: 2018-01-10 | Stop reason: SDUPTHER

## 2017-11-19 PROBLEM — Z80.3 FAMILY HISTORY OF BREAST CANCER IN MOTHER: Status: ACTIVE | Noted: 2017-11-19

## 2017-11-20 NOTE — PROGRESS NOTES
Subjective:       Patient ID: Rubina Gonzalez is a 37 y.o. female.    Chief Complaint:  Follow-up (Breast follow-up, had imaging done at [facility] this wk. Also sees )      History of Present Illness  37-year-old here for follow-up of her breasts.  She also sees Dr. Fish.  She had imaging done at [facility] this week which was normal.      GYN & OB History  No LMP recorded. Patient has had an ablation.   Date of Last Pap: 2017    OB History    Para Term  AB Living   4 4 3 1   4   SAB TAB Ectopic Multiple Live Births           4      # Outcome Date GA Lbr Jero/2nd Weight Sex Delivery Anes PTL Lv   4 Term  39w0d  3.6 kg (7 lb 15 oz) F Vag-Spont EPI  DIONNE   3 Term  38w0d  3.43 kg (7 lb 9 oz) F Vag-Spont EPI  DIONNE   2 Term  37w0d  3.572 kg (7 lb 14 oz) F Vag-Spont EPI  DIONNE   1   36w0d  3.402 kg (7 lb 8 oz) F Vag-Spont EPI  DIONNE      Obstetric Comments   Menarche 13       Review of Systems  Review of Systems   All other systems reviewed and are negative.       Objective:     Vitals:    17 0939   BP: 126/72       Physical Exam:   Constitutional: She appears well-developed and well-nourished.        Pulmonary/Chest: Right breast exhibits no mass, no nipple discharge, no skin change and no tenderness. Left breast exhibits no mass, no nipple discharge, no skin change and no tenderness.                               Assessment/ Plan:          Rubina was seen today for follow-up.    Diagnoses and all orders for this visit:    Galactorrhea not associated with childbirth   Currently patient reports no galactorrhea.   Had imaging study at [facility] and was normal.  Does have a follow-up appointment with Dr. Leeroy Fish.  Family history of breast cancer in mother   Continue to follow and observe closely.      Return in about 2 months (around 2018) for annual.      Health Maintenance       Date Due Completion Date    Lipid Panel 1980 ---    TETANUS VACCINE  04/12/1999 ---    Influenza Vaccine 08/01/2017 ---    Pap Smear with HPV Cotest 12/12/2019 12/12/2016

## 2018-01-10 DIAGNOSIS — F41.0 PANIC ATTACKS: ICD-10-CM

## 2018-01-10 RX ORDER — ALPRAZOLAM 1 MG/1
TABLET ORAL
Qty: 30 TABLET | Refills: 0 | Status: SHIPPED | OUTPATIENT
Start: 2018-01-10 | End: 2018-02-19 | Stop reason: SDUPTHER

## 2018-01-10 NOTE — TELEPHONE ENCOUNTER
Patient requesting refill of Xanax. Annual scheduled 3/1/18.    Allergies and Pharm UTD  Xanax pended

## 2018-02-19 DIAGNOSIS — F41.0 PANIC ATTACKS: ICD-10-CM

## 2018-02-19 RX ORDER — ALPRAZOLAM 1 MG/1
TABLET ORAL
Qty: 30 TABLET | Refills: 0 | Status: SHIPPED | OUTPATIENT
Start: 2018-02-19 | End: 2018-03-22 | Stop reason: SDUPTHER

## 2018-02-19 NOTE — TELEPHONE ENCOUNTER
Patient requesting refill of Xanax. Annual scheduled for 3/1/18.    Allergies and Pharm UTD  Xanax pended

## 2018-03-01 ENCOUNTER — LAB VISIT (OUTPATIENT)
Dept: LAB | Facility: HOSPITAL | Age: 38
End: 2018-03-01
Attending: OBSTETRICS & GYNECOLOGY

## 2018-03-01 ENCOUNTER — OFFICE VISIT (OUTPATIENT)
Dept: OBSTETRICS AND GYNECOLOGY | Facility: CLINIC | Age: 38
End: 2018-03-01
Payer: COMMERCIAL

## 2018-03-01 VITALS
BODY MASS INDEX: 44.14 KG/M2 | SYSTOLIC BLOOD PRESSURE: 126 MMHG | DIASTOLIC BLOOD PRESSURE: 78 MMHG | HEIGHT: 63 IN | WEIGHT: 249.13 LBS

## 2018-03-01 DIAGNOSIS — R10.2 PELVIC PAIN IN FEMALE: ICD-10-CM

## 2018-03-01 DIAGNOSIS — Z01.419 ENCOUNTER FOR GYNECOLOGICAL EXAMINATION: Primary | ICD-10-CM

## 2018-03-01 DIAGNOSIS — R10.9 ABDOMINAL PAIN, UNSPECIFIED ABDOMINAL LOCATION: ICD-10-CM

## 2018-03-01 DIAGNOSIS — Z01.419 ENCOUNTER FOR GYNECOLOGICAL EXAMINATION: ICD-10-CM

## 2018-03-01 LAB
25(OH)D3+25(OH)D2 SERPL-MCNC: 28 NG/ML
ALBUMIN SERPL BCP-MCNC: 3.5 G/DL
ALP SERPL-CCNC: 75 U/L
ALT SERPL W/O P-5'-P-CCNC: 16 U/L
ANION GAP SERPL CALC-SCNC: 8 MMOL/L
AST SERPL-CCNC: 13 U/L
BASOPHILS # BLD AUTO: 0.05 K/UL
BASOPHILS NFR BLD: 0.5 %
BILIRUB SERPL-MCNC: 0.3 MG/DL
BUN SERPL-MCNC: 12 MG/DL
CALCIUM SERPL-MCNC: 9.3 MG/DL
CHLORIDE SERPL-SCNC: 105 MMOL/L
CHOLEST SERPL-MCNC: 193 MG/DL
CHOLEST/HDLC SERPL: 3.7 {RATIO}
CO2 SERPL-SCNC: 26 MMOL/L
CREAT SERPL-MCNC: 0.8 MG/DL
DIFFERENTIAL METHOD: ABNORMAL
EOSINOPHIL # BLD AUTO: 0.3 K/UL
EOSINOPHIL NFR BLD: 3.2 %
ERYTHROCYTE [DISTWIDTH] IN BLOOD BY AUTOMATED COUNT: 13.3 %
EST. GFR  (AFRICAN AMERICAN): >60 ML/MIN/1.73 M^2
EST. GFR  (NON AFRICAN AMERICAN): >60 ML/MIN/1.73 M^2
GLUCOSE SERPL-MCNC: 97 MG/DL
HCT VFR BLD AUTO: 37.7 %
HDLC SERPL-MCNC: 52 MG/DL
HDLC SERPL: 26.9 %
HGB BLD-MCNC: 12.5 G/DL
IMM GRANULOCYTES # BLD AUTO: 0.02 K/UL
IMM GRANULOCYTES NFR BLD AUTO: 0.2 %
LDLC SERPL CALC-MCNC: 115.8 MG/DL
LYMPHOCYTES # BLD AUTO: 1.5 K/UL
LYMPHOCYTES NFR BLD: 16.1 %
MCH RBC QN AUTO: 29 PG
MCHC RBC AUTO-ENTMCNC: 33.2 G/DL
MCV RBC AUTO: 88 FL
MONOCYTES # BLD AUTO: 0.6 K/UL
MONOCYTES NFR BLD: 6 %
NEUTROPHILS # BLD AUTO: 6.8 K/UL
NEUTROPHILS NFR BLD: 74 %
NONHDLC SERPL-MCNC: 141 MG/DL
NRBC BLD-RTO: 0 /100 WBC
PLATELET # BLD AUTO: 272 K/UL
PMV BLD AUTO: 11.9 FL
POTASSIUM SERPL-SCNC: 4.3 MMOL/L
PROT SERPL-MCNC: 7.2 G/DL
RBC # BLD AUTO: 4.31 M/UL
SODIUM SERPL-SCNC: 139 MMOL/L
TRIGL SERPL-MCNC: 126 MG/DL
TSH SERPL DL<=0.005 MIU/L-ACNC: 1.14 UIU/ML
WBC # BLD AUTO: 9.13 K/UL

## 2018-03-01 PROCEDURE — 82306 VITAMIN D 25 HYDROXY: CPT

## 2018-03-01 PROCEDURE — 99395 PREV VISIT EST AGE 18-39: CPT | Mod: S$PBB,,, | Performed by: OBSTETRICS & GYNECOLOGY

## 2018-03-01 PROCEDURE — 85025 COMPLETE CBC W/AUTO DIFF WBC: CPT

## 2018-03-01 PROCEDURE — 80061 LIPID PANEL: CPT

## 2018-03-01 PROCEDURE — 80053 COMPREHEN METABOLIC PANEL: CPT

## 2018-03-01 PROCEDURE — 84443 ASSAY THYROID STIM HORMONE: CPT

## 2018-03-01 PROCEDURE — 99999 PR PBB SHADOW E&M-EST. PATIENT-LVL III: CPT | Mod: PBBFAC,,, | Performed by: OBSTETRICS & GYNECOLOGY

## 2018-03-01 NOTE — PROGRESS NOTES
CC: Well woman exam    Rubina Gonzalez is a 37 y.o. female  presents for a well woman exam.  She is established.  LMP: No LMP recorded. Patient has had an ablation..   Annual Exam, last pap  pap & hpv negative.   C/o occ pelvic pain when she pushes down on her abdomen both in the right and left lower quadrants.  Patient does report a history of constipation where she's taking a stool softener and stimulant daily.  We discussed her following up with Dr. Abril DOHERTY  Seeing [facility] for mammo's & MRI she had a breast biopsy in 2017 which was fibrocystic change.  Last mammogram and ultrasound 2017 stable.  Has follow-up with Polina next month.     Health Maintenance   Topic Date Due    Lipid Panel  1980    TETANUS VACCINE  1999    Influenza Vaccine  2017    Pap Smear with HPV Cotest  2019         Past Medical History:   Diagnosis Date    Abnormal Pap smear of cervix 2015    ASCUS / Hpv Neg    Depression     Family history of breast cancer in mother     dx. age 48    Galactorrhea     Insomnia     Irregular menstrual bleeding     Migraine     topomax    Panic attacks        Past Surgical History:   Procedure Laterality Date    BREAST BIOPSY Right 2017    Core Bx  (Path:  Fibrocystic Changes)    CHOLECYSTECTOMY  2006    ENDOMETRIAL ABLATION  2017    Dr Adrian Bone       OB History    Para Term  AB Living   4 4 3 1   4   SAB TAB Ectopic Multiple Live Births           4      # Outcome Date GA Lbr Jero/2nd Weight Sex Delivery Anes PTL Lv   4 Term  39w0d  3.6 kg (7 lb 15 oz) F Vag-Spont EPI  DIONNE   3 Term  38w0d  3.43 kg (7 lb 9 oz) F Vag-Spont EPI  DIONNE   2 Term  37w0d  3.572 kg (7 lb 14 oz) F Vag-Spont EPI  DIONNE   1   36w0d  3.402 kg (7 lb 8 oz) F Vag-Spont EPI  DIONNE      Obstetric Comments   Menarche 13       Family History   Problem Relation Age of Onset    Colon cancer Paternal Grandmother     Cancer  "Paternal Grandmother     Breast cancer Paternal Grandmother     Colon cancer Maternal Grandfather     Cancer Maternal Grandfather     Hypertension Father     Breast cancer Mother 57    Hypertension Mother     Cancer Mother     Breast cancer Paternal Aunt     Cancer Paternal Aunt     No Known Problems Brother     No Known Problems Brother     Ovarian cancer Neg Hx        Social History   Substance Use Topics    Smoking status: Never Smoker    Smokeless tobacco: Never Used    Alcohol use Yes      Comment: Social       /78   Ht 5' 3" (1.6 m)   Wt 113 kg (249 lb 1.9 oz)   BMI 44.13 kg/m²       ROS:  GENERAL: Denies weight gain or weight loss. Feeling well overall.   SKIN: Denies rash or lesions.   HEAD: Denies head injury or headache.   NODES: Denies enlarged lymph nodes.   CHEST: Denies chest pain or shortness of breath.   CARDIOVASCULAR: Denies palpitations or left sided chest pain.   ABDOMEN: No abdominal pain, constipation, diarrhea, nausea, vomiting or rectal bleeding.   URINARY: No frequency, dysuria, hematuria, or burning on urination.    Physical Exam:    APPEARANCE: Well nourished, well developed, in no acute distress.  AFFECT: WNL, alert and oriented x 3  SKIN: No acne or hirsutism  ABDOMEN: Soft.  No tenderness or masses.  No hepatosplenomegaly.  No hernias.  BREASTS: Symmetrical, no skin changes or visible lesions.  No palpable masses, nipple discharge bilaterally.  PELVIC: Normal external genitalia without lesions.  Normal hair distribution.  Adequate perineal body, normal urethral meatus.  Vagina moist and well rugated without lesions or discharge.  Cervix pink, without lesions, discharge or tenderness.  No significant cystocele or rectocele.  Bimanual exam shows uterus to be normal size, regular, mobile and nontender.  Adnexa without masses or tenderness.    EXTREMITIES: No edema.    ASSESSMENT AND PLAN  1. Encounter for gynecological examination  Lipid panel   2. Pelvic pain in " female  US Pelvis Comp with Transvag NON-OB (xpd     we discussed potential ideology of lower pelvic pain.  We'll get an ultrasound to rule out ovarian cyst however this is unlikely secondary to the chronic nature of the discomfort.  Could be musculoskeletal as the pain seems to be worse with pressing on her abdomen.  Patient also with a long history of chronic constipation.  This could also be causing pelvic discomfort at times.  Patient will follow-up with Dr. bishop gastroenterologist for evaluation of chronic constipation.     3. Abdominal pain, unspecified abdominal location  CBC auto differential    Comprehensive metabolic panel    TSH    Vitamin D    Lipid panel       Patient was counseled today on A.C.S. Pap guidelines and recommendations for yearly pelvic exams, mammograms and monthly self breast exams; to see her PCP for other health maintenance.     Follow-up in about 1 year (around 3/1/2019).

## 2018-03-02 NOTE — PROGRESS NOTES
Erin,   Your labs look good except that your Vit D level is low. I recommend that you take Vit D3 2000 IU daily and repeat another level in 6 months.  Dr Barbosa

## 2018-03-07 ENCOUNTER — OFFICE VISIT (OUTPATIENT)
Dept: OBSTETRICS AND GYNECOLOGY | Facility: CLINIC | Age: 38
End: 2018-03-07
Payer: COMMERCIAL

## 2018-03-07 ENCOUNTER — PATIENT MESSAGE (OUTPATIENT)
Dept: OBSTETRICS AND GYNECOLOGY | Facility: CLINIC | Age: 38
End: 2018-03-07

## 2018-03-07 VITALS
WEIGHT: 256.31 LBS | BODY MASS INDEX: 45.41 KG/M2 | HEIGHT: 63 IN | SYSTOLIC BLOOD PRESSURE: 122 MMHG | DIASTOLIC BLOOD PRESSURE: 80 MMHG

## 2018-03-07 DIAGNOSIS — R10.2 PELVIC PAIN IN FEMALE: Primary | ICD-10-CM

## 2018-03-07 DIAGNOSIS — N83.201 HEMORRHAGIC CYST OF RIGHT OVARY: ICD-10-CM

## 2018-03-07 PROCEDURE — 99213 OFFICE O/P EST LOW 20 MIN: CPT | Mod: S$PBB,,, | Performed by: NURSE PRACTITIONER

## 2018-03-07 PROCEDURE — 99999 PR PBB SHADOW E&M-EST. PATIENT-LVL III: CPT | Mod: PBBFAC,,, | Performed by: NURSE PRACTITIONER

## 2018-03-07 NOTE — PROGRESS NOTES
"Chief Complaint: Problem:     Chief Complaint   Patient presents with    Pelvic Pain      Dr Barbosa  last pap   neg hpv neg         (Dr. Barbosa patient)    Last Pap:  2017 Normal,  HPV negative          HPI:      Rubina Gonzalez is a 37 y.o.  who presents today for pelvic u/s due to recent c/o at her annual of "occ pelvic pain when she pushes down on her abdomen both in the right and left lower quadrants.  Patient does report a history of constipation where she's taking a stool softener and stimulant daily".    LMP: No LMP recorded. Patient has had an ablation..    Ms. Gonzalez is currently sexually active with a single male partner.   She declines STD screening today with her examination.      Past Medical History:   Diagnosis Date    Abnormal Pap smear of cervix 2015    ASCUS / Hpv Neg    Depression     Family history of breast cancer in mother     dx. age 48    Galactorrhea     Insomnia     Irregular menstrual bleeding     Migraine     topomax    Panic attacks      Past Surgical History:   Procedure Laterality Date    BREAST BIOPSY Right 2017    Core Bx  (Path:  Fibrocystic Changes)    CHOLECYSTECTOMY  2006    ENDOMETRIAL ABLATION  2017    Dr Nguyễn Barbosa     Social History     Social History    Marital status:      Spouse name: N/A    Number of children: N/A    Years of education: N/A     Occupational History    Not on file.     Social History Main Topics    Smoking status: Never Smoker    Smokeless tobacco: Never Used    Alcohol use Yes      Comment: Social    Drug use: No    Sexual activity: Yes     Partners: Male     Birth control/ protection: Partner-Vasectomy      Comment: :  Ha     Other Topics Concern    Not on file     Social History Narrative    Four girls, one accepted to [employer]     Family History   Problem Relation Age of Onset    Colon cancer Paternal Grandmother     Cancer Paternal Grandmother     Breast cancer Paternal " "Grandmother     Colon cancer Maternal Grandfather     Cancer Maternal Grandfather     Hypertension Father     Breast cancer Mother 57    Hypertension Mother     Cancer Mother     Breast cancer Paternal Aunt     Cancer Paternal Aunt     No Known Problems Brother     No Known Problems Brother     Ovarian cancer Neg Hx      OB History      Para Term  AB Living    4 4 3 1   4    SAB TAB Ectopic Multiple Live Births            4        Obstetric Comments    Menarche 13          ROS:     GENERAL: Denies unintentional weight gain or weight loss. Feeling well overall.   SKIN: Denies rash or lesions.   HEENT: Denies headaches, or vision changes.   CARDIOVASCULAR: Denies palpitations or chest pain.   RESPIRATORY: Denies shortness of breath or dyspnea on exertion.  BREASTS: Denies pain, lumps, or nipple discharge.   ABDOMEN: Reports mild, intermittent RLQ (sometimes towards midline or LLQ) abdominal pain, intermittent constipation; denies diarrhea, nausea, vomiting, change in appetite.  URINARY: Denies frequency, dysuria, hematuria.  NEUROLOGIC: Denies syncope or weakness.   PSYCHIATRIC: Denies depression, anxiety or mood swings.VULVAR: No pain, no lesions and no itching.  VAGINAL: No relaxation, no itching, no abnormal bleeding and no lesions.    Physical Exam:      PHYSICAL EXAM:  /80   Ht 5' 3" (1.6 m)   Wt 116.3 kg (256 lb 4.6 oz)   BMI 45.40 kg/m²   Body mass index is 45.4 kg/m².     APPEARANCE: Well nourished, well developed, in no acute distress.  PSYCH: Appropriate mood and affect.  SKIN: No acne or hirsutism.  NECK: Neck symmetric without masses or thyromegaly  NODES: No inguinal, axillary, or supraclavicular lymph node enlargement  CHEST: Normal respiratory effort.  ABDOMEN: Soft.  Mild tenderness to palpation of RLQ, but no rebound or guarding;  no masses.  No TTP of LLQ or suprapubic.      Assessment/Plan:     Pelvic pain in female    Hemorrhagic cyst of right ovary    * Schedule " "f/u pelvic u/s recommended per radiologist in 6-12 weeks to reassess.  Called pt and left message at 10:40am to call office.      Reviewed preliminary u/s report from today:  "Impression:    2.3 cm endometrioma versus hemorrhagic cyst of the right ovary. Followup ultrasound is recommended in 6-12 weeks to assess for resolution.  Otherwise no significant abnormality, noting poor visualization of left ovarian vascular flow due to overall poor visualization of the left ovary on transvaginal examination."     Counseling:     Patient was counseled today on current ASCCP pap guidelines, the recommendation for yearly pelvic exams, healthy diet and exercise routines, annual mammograms and breast self awareness. She is to see her PCP for other health maintenance.     Follow-up if symptoms worsen or fail to improve.    "

## 2018-03-13 NOTE — TELEPHONE ENCOUNTER
Spoke with pt on afternoon of 03/07/18 regarding u/s results.  Reassurance given.  Did schedule pt for 6 week u/s follow-up due to final u/s report from radiologist.  Reviewed with  and she agrees with POC.

## 2018-03-22 DIAGNOSIS — F41.0 PANIC ATTACKS: ICD-10-CM

## 2018-03-22 RX ORDER — ALPRAZOLAM 1 MG/1
TABLET ORAL
Qty: 30 TABLET | Refills: 0 | Status: SHIPPED | OUTPATIENT
Start: 2018-03-22 | End: 2018-09-13 | Stop reason: SDUPTHER

## 2018-04-05 ENCOUNTER — PATIENT MESSAGE (OUTPATIENT)
Dept: OBSTETRICS AND GYNECOLOGY | Facility: CLINIC | Age: 38
End: 2018-04-05

## 2018-04-30 ENCOUNTER — OFFICE VISIT (OUTPATIENT)
Dept: OBSTETRICS AND GYNECOLOGY | Facility: CLINIC | Age: 38
End: 2018-04-30
Payer: COMMERCIAL

## 2018-04-30 VITALS — DIASTOLIC BLOOD PRESSURE: 88 MMHG | SYSTOLIC BLOOD PRESSURE: 128 MMHG | HEIGHT: 63 IN

## 2018-04-30 DIAGNOSIS — F41.0 PANIC ATTACKS: ICD-10-CM

## 2018-04-30 DIAGNOSIS — N83.201 CYST OF RIGHT OVARY: Primary | ICD-10-CM

## 2018-04-30 DIAGNOSIS — N83.202 CYST OF LEFT OVARY: ICD-10-CM

## 2018-04-30 PROCEDURE — 99213 OFFICE O/P EST LOW 20 MIN: CPT | Mod: S$GLB,,, | Performed by: OBSTETRICS & GYNECOLOGY

## 2018-04-30 PROCEDURE — 99999 PR PBB SHADOW E&M-EST. PATIENT-LVL II: CPT | Mod: PBBFAC,,, | Performed by: OBSTETRICS & GYNECOLOGY

## 2018-04-30 RX ORDER — HYDROXYZINE HYDROCHLORIDE 10 MG/1
TABLET, FILM COATED ORAL
COMMUNITY
Start: 2018-04-29 | End: 2018-09-13

## 2018-04-30 RX ORDER — ALPRAZOLAM 1 MG/1
TABLET ORAL
Qty: 30 TABLET | Refills: 0 | OUTPATIENT
Start: 2018-04-30

## 2018-04-30 RX ORDER — LEVONORGESTREL AND ETHINYL ESTRADIOL 0.1-0.02MG
1 KIT ORAL DAILY
Qty: 28 TABLET | Refills: 11 | Status: SHIPPED | OUTPATIENT
Start: 2018-04-30 | End: 2018-07-27

## 2018-04-30 NOTE — PROGRESS NOTES
Subjective:       Patient ID: Rubina Gonzalez is a 38 y.o. female.    Chief Complaint:  F/u u/s     History of Present Illness  Here for f/u u/s of 2.3 cm endometrioma versus hemorrhagic cyst of the right ovary.   Today right ov cyst has resolved but now has a cyst on left ovary 5x5cm simple  No c/o Mild discomfort per pt  Tong per her on Trinrellix and doing beter   Saw Lemann and put on Linzess and having colonoscopy    GYN & OB History  No LMP recorded. Patient has had an ablation.   Date of Last Pap: 2017    OB History    Para Term  AB Living   4 4 3 1   4   SAB TAB Ectopic Multiple Live Births           4      # Outcome Date GA Lbr Jero/2nd Weight Sex Delivery Anes PTL Lv   4 Term  39w0d  3.6 kg (7 lb 15 oz) F Vag-Spont EPI  DIONNE   3 Term  38w0d  3.43 kg (7 lb 9 oz) F Vag-Spont EPI  DIONNE   2 Term  37w0d  3.572 kg (7 lb 14 oz) F Vag-Spont EPI  DIONNE   1   36w0d  3.402 kg (7 lb 8 oz) F Vag-Spont EPI  DIONNE      Obstetric Comments   Menarche 13       Review of Systems  Review of Systems     Objective:     Vitals:    18 1021   BP: 128/88       Physical Exam:   Constitutional: She is oriented to person, place, and time. She appears well-developed and well-nourished.             Abdominal: Soft. She exhibits no mass. There is no tenderness. There is no rebound and no guarding.                 Neurological: She is alert and oriented to person, place, and time.    Skin: Skin is warm.    Psychiatric: She has a normal mood and affect. Her behavior is normal.          Assessment/ Plan:     Orders Placed This Encounter    US Pelvis Comp with Transvag NON-OB (xpd    levonorgestrel-ethinyl estradiol (AVIANE,ALESSE,LESSINA) 0.1-20 mg-mcg per tablet       Diagnoses and all orders for this visit:    Cyst of right ovary   Cyst resolved on right but now w cyst on left  Cyst of left ovary  -     US Pelvis Comp with Transvag NON-OB (xpd; Future  -     levonorgestrel-ethinyl estradiol  (MARTHA STERN LESSINA) 0.1-20 mg-mcg per tablet; Take 1 tablet by mouth once daily.        Follow-up in about 2 months (around 7/14/2018).with an /s       Health Maintenance       Date Due Completion Date    TETANUS VACCINE 04/12/1999 ---    Influenza Vaccine 08/01/2017 ---    Pap Smear with HPV Cotest 12/12/2019 12/12/2016

## 2018-07-05 ENCOUNTER — OFFICE VISIT (OUTPATIENT)
Dept: OBSTETRICS AND GYNECOLOGY | Facility: CLINIC | Age: 38
End: 2018-07-05
Payer: COMMERCIAL

## 2018-07-05 VITALS
SYSTOLIC BLOOD PRESSURE: 132 MMHG | WEIGHT: 257.94 LBS | BODY MASS INDEX: 45.7 KG/M2 | DIASTOLIC BLOOD PRESSURE: 88 MMHG | HEIGHT: 63 IN

## 2018-07-05 DIAGNOSIS — R63.5 WEIGHT GAIN: Primary | ICD-10-CM

## 2018-07-05 DIAGNOSIS — I10 HYPERTENSION, UNSPECIFIED TYPE: ICD-10-CM

## 2018-07-05 DIAGNOSIS — N83.201 CYST OF RIGHT OVARY: ICD-10-CM

## 2018-07-05 PROCEDURE — 99999 PR PBB SHADOW E&M-EST. PATIENT-LVL III: CPT | Mod: PBBFAC,,, | Performed by: OBSTETRICS & GYNECOLOGY

## 2018-07-05 PROCEDURE — 99213 OFFICE O/P EST LOW 20 MIN: CPT | Mod: S$GLB,,, | Performed by: OBSTETRICS & GYNECOLOGY

## 2018-07-05 PROCEDURE — 3008F BODY MASS INDEX DOCD: CPT | Mod: CPTII,S$GLB,, | Performed by: OBSTETRICS & GYNECOLOGY

## 2018-07-05 RX ORDER — LISINOPRIL 10 MG/1
TABLET ORAL
Refills: 1 | COMMUNITY
Start: 2018-06-20 | End: 2018-09-13

## 2018-07-06 ENCOUNTER — PATIENT MESSAGE (OUTPATIENT)
Dept: OBSTETRICS AND GYNECOLOGY | Facility: CLINIC | Age: 38
End: 2018-07-06

## 2018-07-08 NOTE — PROGRESS NOTES
Subjective:       Patient ID: Rubina Gonzalez is a 38 y.o. female.    Chief Complaint:  Gyn ultrasound (Gyn u/s follow-up of complex cyst. C/o Went to [facility] ER for vertigo/lightheadedness had CT Scan done. Diagnosed w/ hypertension now on Lisinopril 10mg x 3wks.)      History of Present Illness  38-year-old here for repeat ultrasound secondary to a complex cyst on the left ovary.  Today for complex cyst on the left ovary has now resolved but there is a large 7 cm cyst on the right ovary.  Patient is status post endometrial ablation.  Last visit was started on low-dose OCPs secondary to cyst formation and irregular menstrual cycle.  Cycles are regular on the low-dose OCPs.  States since she's been on OCPs her migraines have much improved    Patient with a 65 pound weight gain over the past year.  Has been recently diagnosed with hypertension.  I discussed my concerns with her being on birth control pills despite the fact that her blood pressure today is under good control on blood pressure medication.  Discussed the importance of weight loss    She is going back to her physician in the next week or so.  I encouraged her at this point to stop OCPs and just see what happens as I am most concerned about her physical health regarding hypertension, dizziness and the possibility of ocular migraines.  We decided to stop OCPs for the next month or 2 and see if the symptoms improve.      GYN & OB History  No LMP recorded. Patient has had an ablation.   Date of Last Pap: 2017    OB History    Para Term  AB Living   4 4 3 1   4   SAB TAB Ectopic Multiple Live Births           4      # Outcome Date GA Lbr Jero/2nd Weight Sex Delivery Anes PTL Lv   4 Term  39w0d  3.6 kg (7 lb 15 oz) F Vag-Spont EPI  DIONNE   3 Term  38w0d  3.43 kg (7 lb 9 oz) F Vag-Spont EPI  DIONNE   2 Term  37w0d  3.572 kg (7 lb 14 oz) F Vag-Spont EPI  DIONNE   1   36w0d  3.402 kg (7 lb 8 oz) F Vag-Spont EPI  DIONNE      Obstetric  Comments   Menarche 13       Review of Systems  Review of Systems see history of present illness     Objective:     Vitals:    07/05/18 1431   BP: 132/88       Physical Exam  Deferred today     Assessment/ Plan:     Orders Placed This Encounter    US Pelvis Comp with Transvag NON-OB (xpd       Rubina was seen today for gyn ultrasound.    Diagnoses and all orders for this visit:    Weight gain   Discussed diet and exercise at length with patient.  Hypertension, unspecified type   We'll stop OCPs and follow-up with primary care physician  Cyst of right ovary  -     US Pelvis Comp with Transvag NON-OB (xpd; Future   Will repeat ultrasound in 2 months to make sure that the cyst has resolved.  Torsion precautions given to patient and explained that if she had any pain be sure to let me know.  Patient currently asymptomatic with no pain at all.      Follow-up in about 2 months (around 9/5/2018).      Health Maintenance       Date Due Completion Date    TETANUS VACCINE 04/12/1999 ---    Influenza Vaccine 08/01/2018 ---    Pap Smear with HPV Cotest 12/12/2019 12/12/2016

## 2018-07-18 ENCOUNTER — TELEPHONE (OUTPATIENT)
Dept: OBSTETRICS AND GYNECOLOGY | Facility: CLINIC | Age: 38
End: 2018-07-18

## 2018-07-18 DIAGNOSIS — R92.8 ABNORMAL SCREENING MAMMOGRAM: Primary | ICD-10-CM

## 2018-07-18 NOTE — TELEPHONE ENCOUNTER
Pt calling because BRADLEY told her she needs Dr Barbosa to send an order for bilateral diagnostic mammo and u/s follow up to recent screening mammo. Please fax order.

## 2018-07-24 ENCOUNTER — TELEPHONE (OUTPATIENT)
Dept: OBSTETRICS AND GYNECOLOGY | Facility: CLINIC | Age: 38
End: 2018-07-24

## 2018-07-24 NOTE — TELEPHONE ENCOUNTER
"Pt is aware Dr. Barbosa is out today.  She just wanted to "chat" with Dr. Barbosa about her appt.  Did not want to disclose any more information.  Offered appt with Dr. Barbosa tomorrow.  Declined, states "dr. Barbosa can just call her to chat for a little bit".    "

## 2018-07-24 NOTE — TELEPHONE ENCOUNTER
Dr. Barbosa pt wants to speak with only Dr. Barbosa regarding her last appointment and what has happened since she stopped taking the birthcontrol. Please call pt

## 2018-07-27 NOTE — TELEPHONE ENCOUNTER
Spoke to pt  Stopped ocp and ocular migraines and Bp issues have resolved   Will keep off pill    repeat u/s in 6 weeks    if needs ocp in future would do minipill

## 2018-08-27 ENCOUNTER — PATIENT MESSAGE (OUTPATIENT)
Dept: OBSTETRICS AND GYNECOLOGY | Facility: CLINIC | Age: 38
End: 2018-08-27

## 2018-09-13 ENCOUNTER — OFFICE VISIT (OUTPATIENT)
Dept: OBSTETRICS AND GYNECOLOGY | Facility: CLINIC | Age: 38
End: 2018-09-13
Payer: COMMERCIAL

## 2018-09-13 VITALS — WEIGHT: 255.75 LBS | HEIGHT: 63 IN | BODY MASS INDEX: 45.32 KG/M2

## 2018-09-13 DIAGNOSIS — N60.41 DUCT ECTASIA OF BREAST, RIGHT: Primary | ICD-10-CM

## 2018-09-13 DIAGNOSIS — F41.0 PANIC ATTACKS: ICD-10-CM

## 2018-09-13 PROCEDURE — 99213 OFFICE O/P EST LOW 20 MIN: CPT | Mod: S$GLB,,, | Performed by: OBSTETRICS & GYNECOLOGY

## 2018-09-13 PROCEDURE — 3008F BODY MASS INDEX DOCD: CPT | Mod: CPTII,S$GLB,, | Performed by: OBSTETRICS & GYNECOLOGY

## 2018-09-13 PROCEDURE — 99999 PR PBB SHADOW E&M-EST. PATIENT-LVL III: CPT | Mod: PBBFAC,,, | Performed by: OBSTETRICS & GYNECOLOGY

## 2018-09-13 RX ORDER — ALPRAZOLAM 1 MG/1
TABLET ORAL
Qty: 30 TABLET | Refills: 0 | Status: SHIPPED | OUTPATIENT
Start: 2018-09-13 | End: 2019-02-14 | Stop reason: SDUPTHER

## 2018-09-13 NOTE — PROGRESS NOTES
Subjective:       Patient ID: Rubina Gonzalez is a 38 y.o. female.    Chief Complaint:  Gyn ultrasound (follow-up u/s for ovarian cyst. Also wants to discuss mammo & u/s)      History of Present Illness  37 y/o here for u/s to f/u a left ovarian cyst.  Ultrasound performed today and cyst has resolved.  Right ovary is normal.  Patient would like to review mammogram.  Had mammogram in 2018.  There is a stable dilated duct full of echogenic material in the right breast.  Status post biopsy, biopsy at [facility] which was benign.  Patient has been seen by Dr. Quarles for follow-up after the biopsy as this was her mother's breast surgeon.  The patient's mother now  had breast cancer.  Patient would like to go back for follow-up [facility] and see  breast specialist  Orders placed.  Biopsy at [facility]:  CORE BIOPSY, RIGHT BREAST SHOWING BENIGN FIBROCYSTIC CHANGES, NO ATYPIA IDENTIFIED.  NOTE: THIS CASE WAS REVIEWED BY DR. WORKMAN WHO CONCURS WITH THE DIAGNOSIS.  Diagnosed by: Cole Mac M.D.    GYN & OB History  No LMP recorded. Patient has had an ablation.   Date of Last Pap: 2017    OB History    Para Term  AB Living   4 4 3 1   4   SAB TAB Ectopic Multiple Live Births           4      # Outcome Date GA Lbr Jero/2nd Weight Sex Delivery Anes PTL Lv   4 Term  39w0d  3.6 kg (7 lb 15 oz) F Vag-Spont EPI  DIONNE   3 Term  38w0d  3.43 kg (7 lb 9 oz) F Vag-Spont EPI  DIONNE   2 Term  37w0d  3.572 kg (7 lb 14 oz) F Vag-Spont EPI  DIONNE   1   36w0d  3.402 kg (7 lb 8 oz) F Vag-Spont EPI  DIONNE      Obstetric Comments   Menarche 13       Review of Systems  Review of Systems   All other systems reviewed and are negative.       Objective:     There were no vitals filed for this visit.    Physical Exam:   Constitutional: She is oriented to person, place, and time. She appears well-developed and well-nourished.                           Neurological: She is alert and oriented to person, place, and time.      Psychiatric: She has a normal mood and affect. Her behavior is normal.          Assessment/ Plan:     Orders Placed This Encounter    Ambulatory Referral to Breast Surgery    ALPRAZolam (XANAX) 1 MG tablet       Rubina was seen today for gyn ultrasound.    Diagnoses and all orders for this visit:    Duct ectasia of breast, right and fibrocystic changes to breast  -     Ambulatory Referral to Breast Surgery    Panic attacks  -     ALPRAZolam (XANAX) 1 MG tablet; TAKE 1 TABLET(1 MG) BY MOUTH TWICE DAILY AS NEEDED FOR ANXIETY        Follow-up in about 4 months (around 1/13/2019) for annual.      Health Maintenance       Date Due Completion Date    TETANUS VACCINE 04/12/1999 ---    Influenza Vaccine 08/01/2018 ---    Pap Smear with HPV Cotest 12/12/2019 12/12/2016

## 2019-02-14 ENCOUNTER — OFFICE VISIT (OUTPATIENT)
Dept: OBSTETRICS AND GYNECOLOGY | Facility: CLINIC | Age: 39
End: 2019-02-14
Payer: COMMERCIAL

## 2019-02-14 VITALS
BODY MASS INDEX: 45.32 KG/M2 | SYSTOLIC BLOOD PRESSURE: 122 MMHG | DIASTOLIC BLOOD PRESSURE: 78 MMHG | WEIGHT: 255.75 LBS | HEIGHT: 63 IN

## 2019-02-14 DIAGNOSIS — R92.8 ABNORMAL MAMMOGRAM OF RIGHT BREAST: ICD-10-CM

## 2019-02-14 DIAGNOSIS — Z01.419 ENCOUNTER FOR GYNECOLOGICAL EXAMINATION: Primary | ICD-10-CM

## 2019-02-14 DIAGNOSIS — F41.0 PANIC ATTACKS: ICD-10-CM

## 2019-02-14 PROCEDURE — 99395 PR PREVENTIVE VISIT,EST,18-39: ICD-10-PCS | Mod: S$GLB,,, | Performed by: OBSTETRICS & GYNECOLOGY

## 2019-02-14 PROCEDURE — 3078F DIAST BP <80 MM HG: CPT | Mod: CPTII,S$GLB,, | Performed by: OBSTETRICS & GYNECOLOGY

## 2019-02-14 PROCEDURE — 3074F SYST BP LT 130 MM HG: CPT | Mod: CPTII,S$GLB,, | Performed by: OBSTETRICS & GYNECOLOGY

## 2019-02-14 PROCEDURE — 99999 PR PBB SHADOW E&M-EST. PATIENT-LVL III: ICD-10-PCS | Mod: PBBFAC,,, | Performed by: OBSTETRICS & GYNECOLOGY

## 2019-02-14 PROCEDURE — 99395 PREV VISIT EST AGE 18-39: CPT | Mod: S$GLB,,, | Performed by: OBSTETRICS & GYNECOLOGY

## 2019-02-14 PROCEDURE — 3074F PR MOST RECENT SYSTOLIC BLOOD PRESSURE < 130 MM HG: ICD-10-PCS | Mod: CPTII,S$GLB,, | Performed by: OBSTETRICS & GYNECOLOGY

## 2019-02-14 PROCEDURE — 99999 PR PBB SHADOW E&M-EST. PATIENT-LVL III: CPT | Mod: PBBFAC,,, | Performed by: OBSTETRICS & GYNECOLOGY

## 2019-02-14 PROCEDURE — 3078F PR MOST RECENT DIASTOLIC BLOOD PRESSURE < 80 MM HG: ICD-10-PCS | Mod: CPTII,S$GLB,, | Performed by: OBSTETRICS & GYNECOLOGY

## 2019-02-14 RX ORDER — ALPRAZOLAM 1 MG/1
TABLET ORAL
Qty: 30 TABLET | Refills: 0 | Status: CANCELLED | OUTPATIENT
Start: 2019-02-14

## 2019-02-14 RX ORDER — ALPRAZOLAM 1 MG/1
TABLET ORAL
Qty: 30 TABLET | Refills: 0 | Status: SHIPPED | OUTPATIENT
Start: 2019-02-14 | End: 2019-06-19

## 2019-02-14 NOTE — PROGRESS NOTES
Chief Complaint: well woman exam  Well Woman (Annual Exam, last pap/hpv  negative, Last mammo [facility]  Willam following)      Rubina Gonzalez is a 38 y.o. female  presents for a well woman exam.    She is established.  No complaints.     Cycles:  None status post endometrial ablation.  LMP: No LMP recorded. Patient has had an ablation..    Contraception: The current method of family planning is vasectomy.  Last pap: 2017 normal HPV  Last MMG:  2018 normal mammogram with ultrasound with a dilated right breast duct.  Appears benign going for a six-month follow-up ultrasound this month of the right breast.  Patient sees Willam      Medication List with Changes/Refills   Current Medications    MULTIVITAMIN WITH MINERALS TABLET    Take 1 tablet by mouth once daily. ( MEDI WEIGHT LOSS )   Changed and/or Refilled Medications    Modified Medication Previous Medication    ALPRAZOLAM (XANAX) 1 MG TABLET ALPRAZolam (XANAX) 1 MG tablet       TAKE 1 TABLET(1 MG) BY MOUTH TWICE DAILY AS NEEDED FOR ANXIETY    TAKE 1 TABLET(1 MG) BY MOUTH TWICE DAILY AS NEEDED FOR ANXIETY       Past Medical History:   Diagnosis Date    Abnormal Pap smear of cervix 2015    ASCUS / Hpv Neg    Depression     Family history of breast cancer in mother     dx. age 48    Galactorrhea     Hypertension     on Lisinopril    Insomnia     Irregular menstrual bleeding     Migraine     topomax    Panic attacks        Past Surgical History:   Procedure Laterality Date    ABLATION-ENDOMETRIAL-THERMAL N/A 3/28/2017    Performed by [facility] at [facility] OR    BREAST BIOPSY Right 2017    Core Bx  (Path:  Fibrocystic Changes)    CHOLECYSTECTOMY  2006    ENDOMETRIAL ABLATION  2017    Dr Nguyễn Barbosa    [id number] N/A 3/28/2017    Performed by [facility]n Barbosa MD at [facility] OR       OB History    Para Term  AB Living   4 4 3 1   4   SAB TAB Ectopic Multiple Live Births           4  "     # Outcome Date GA Lbr Jero/2nd Weight Sex Delivery Anes PTL Lv   4 Term  39w0d  3.6 kg (7 lb 15 oz) F Vag-Spont EPI  DIONNE   3 Term  38w0d  3.43 kg (7 lb 9 oz) F Vag-Spont EPI  DIONNE   2 Term  37w0d  3.572 kg (7 lb 14 oz) F Vag-Spont EPI  DIONNE   1   36w0d  3.402 kg (7 lb 8 oz) F Vag-Spont EPI  DIONNE      Obstetric Comments   Menarche 13       Family History   Problem Relation Age of Onset    Colon cancer Paternal Grandmother     Cancer Paternal Grandmother     Breast cancer Paternal Grandmother     Colon cancer Maternal Grandfather     Cancer Maternal Grandfather     Hypertension Father     Breast cancer Mother 57    Hypertension Mother     Cancer Mother     Breast cancer Paternal Aunt     Cancer Paternal Aunt     No Known Problems Brother     No Known Problems Brother     Ovarian cancer Neg Hx        Social History     Tobacco Use    Smoking status: Never Smoker    Smokeless tobacco: Never Used   Substance Use Topics    Alcohol use: Yes     Comment: Social    Drug use: No         ROS:  GENERAL: Denies weight gain or weight loss. Feeling well overall.   SKIN: Denies rash or lesions.   HEENT: Denies headaches, or vision changes.   CARDIOVASCULAR: Denies palpitations or left sided chest pain.   RESPIRATORY: Denies shortness of breath or dyspnea on exertion.  BREASTS: Denies pain, lumps, or nipple discharge.   ABDOMEN: Denies abdominal pain, constipation, diarrhea, nausea, vomiting, change in appetite or rectal bleeding.   URINARY: Denies frequency, dysuria, hematuria.  NEUROLOGIC: Denies syncope or weakness.   PSYCHIATRIC: Denies depression, anxiety or mood swings.    Physical Exam:  /78   Ht 5' 3" (1.6 m)   Wt 116 kg (255 lb 11.7 oz)   BMI 45.30 kg/m²     APPEARANCE: Well nourished, well developed, in no acute distress.  AFFECT: WNL, alert and oriented x 3  SKIN: No acne or hirsutism  BREASTS: Symmetrical, no skin changes.                     No nipple discharge. No " palpable masses bilaterally  NODES: No inguinal nor axillary LAD  ABDOMEN: soft Non tender Non distended No masses  PELVIC:   Normal external genitalia without lesions.  Normal hair distribution.   Adequate perineal body, normal urethral meatus. No signif cystocele or rectocele.  Vagina moist and well rugated without lesions or discharge.    Cervix pink, without lesions, discharge or tenderness.     PAP not performed   Bimanual exam shows uterus to be normal size, regular, mobile and nontender.  Adnexa without masses or tenderness.    EXTREMITIES: No edema.        ASSESSMENT AND PLAN    Rubina was seen today for well woman.    Diagnoses and all orders for this visit:    Encounter for gynecological examination   Annual exam    Panic attacks  -     ALPRAZolam (XANAX) 1 MG tablet; TAKE 1 TABLET(1 MG) BY MOUTH TWICE DAILY AS NEEDED FOR ANXIETY    Abnormal mammogram of right breast  -     US Breast Right Complete; Future  -     US Breast Right Complete      Follow-up in about 1 year (around 2/14/2020).    Patient was counseled today on A.C.S. Pap guidelines and recommendations for yearly pelvic exams, mammograms and monthly self breast exams; to see her PCP for other health maintenance.     Patient encouraged to register for portal and results will be sent via portal.       Health Maintenance   Topic Date Due    TETANUS VACCINE  04/12/1999    Influenza Vaccine  08/01/2018    Pap Smear with HPV Cotest  12/12/2019    Lipid Panel  Completed

## 2019-05-24 ENCOUNTER — TELEPHONE (OUTPATIENT)
Dept: OBSTETRICS AND GYNECOLOGY | Facility: CLINIC | Age: 39
End: 2019-05-24

## 2019-05-24 NOTE — TELEPHONE ENCOUNTER
JASON......Gave pt. Breast ultrasound results from [facility] stating ultrasound looked fine, repeat MMG & u/s in 6 mnths.    Pt. However is seeing  next week & is going to discuss getting it removed since it is bothering her & she wants it over.

## 2019-05-28 ENCOUNTER — TELEPHONE (OUTPATIENT)
Dept: OBSTETRICS AND GYNECOLOGY | Facility: CLINIC | Age: 39
End: 2019-05-28

## 2019-05-28 NOTE — TELEPHONE ENCOUNTER
Spoke with pt, states she saw  who says there is more than one duct so removing is not an option.  Also when he did his exam he felt abnormalities underneath breast that were tender, nothing hes worried about but may be in relation to her cycle (pt does not get cycles so has no idea where she is in cycle).   He still wants her to follow-up in 6 mnths, however pt. Is worried about the abnormality he felt. She trusts  100% but still worries her & wants your opinion.    Pt. Notified  out of office today (Tuesday).

## 2019-05-29 NOTE — TELEPHONE ENCOUNTER
Reassure her that I do not what know when he felt but if he was worried he certainly would remove it.  If he is not worried then I am not worried.  Her ultrasound of the right breast is normal.  She is due for a bilateral mammogram in August.  Her last mammogram was normal and this ultrasound looks fine per the radiologist and per Dr. Quarles.  So I would not worry.

## 2019-06-13 ENCOUNTER — TELEPHONE (OUTPATIENT)
Dept: OBSTETRICS AND GYNECOLOGY | Facility: CLINIC | Age: 39
End: 2019-06-13

## 2019-06-13 NOTE — TELEPHONE ENCOUNTER
Dr. Barbosa pt, states she spoke briefly with Lesley a couple of weeks ago about a lump she noticed on her right breast, at the time she wasn't as concerned as she is now because it's been 2 weeks now and the lump is still there. It's internal, no pain, says its small like the size of the tip of her finger. Please call pt and advise, thank you

## 2019-06-13 NOTE — TELEPHONE ENCOUNTER
"Pt reports a small lump in right breast under nipple about the tip of her finger.  It has not gotten smaller or larger since she first noticed it about 2 weeks ago.  Denies pain.  She gets a mammo yearly and an US every 6 months on the right breast.  She just had an US 5/6/19, mammo 8/2018 and recommended to have another mammo and US in 6 months.  I told her since she just had an US about 5 weeks ago she probably doesn't need imaging so soon but wanted to talk to you.      Please see US in Media and advise.  It looks like they noticed a "stable duct estasia" in that area.    "

## 2019-06-13 NOTE — TELEPHONE ENCOUNTER
I see in her chart that she has been seeing Creely for breast issues. I would either make appt with him or me to do exam and then decide if she needs futher imaging. If the lump has only been there for 2 weeks then the u/s was before that so I think she probably does need more imaging.

## 2019-06-19 ENCOUNTER — OFFICE VISIT (OUTPATIENT)
Dept: OBSTETRICS AND GYNECOLOGY | Facility: CLINIC | Age: 39
End: 2019-06-19
Payer: COMMERCIAL

## 2019-06-19 VITALS
SYSTOLIC BLOOD PRESSURE: 138 MMHG | BODY MASS INDEX: 46.9 KG/M2 | WEIGHT: 264.69 LBS | HEIGHT: 63 IN | DIASTOLIC BLOOD PRESSURE: 96 MMHG

## 2019-06-19 DIAGNOSIS — N63.0 BREAST LUMP: Primary | ICD-10-CM

## 2019-06-19 PROCEDURE — 3080F PR MOST RECENT DIASTOLIC BLOOD PRESSURE >= 90 MM HG: ICD-10-PCS | Mod: CPTII,S$GLB,, | Performed by: OBSTETRICS & GYNECOLOGY

## 2019-06-19 PROCEDURE — 3008F PR BODY MASS INDEX (BMI) DOCUMENTED: ICD-10-PCS | Mod: CPTII,S$GLB,, | Performed by: OBSTETRICS & GYNECOLOGY

## 2019-06-19 PROCEDURE — 3075F SYST BP GE 130 - 139MM HG: CPT | Mod: CPTII,S$GLB,, | Performed by: OBSTETRICS & GYNECOLOGY

## 2019-06-19 PROCEDURE — 3075F PR MOST RECENT SYSTOLIC BLOOD PRESS GE 130-139MM HG: ICD-10-PCS | Mod: CPTII,S$GLB,, | Performed by: OBSTETRICS & GYNECOLOGY

## 2019-06-19 PROCEDURE — 99999 PR PBB SHADOW E&M-EST. PATIENT-LVL III: CPT | Mod: PBBFAC,,, | Performed by: OBSTETRICS & GYNECOLOGY

## 2019-06-19 PROCEDURE — 3080F DIAST BP >= 90 MM HG: CPT | Mod: CPTII,S$GLB,, | Performed by: OBSTETRICS & GYNECOLOGY

## 2019-06-19 PROCEDURE — 99213 OFFICE O/P EST LOW 20 MIN: CPT | Mod: S$GLB,,, | Performed by: OBSTETRICS & GYNECOLOGY

## 2019-06-19 PROCEDURE — 3008F BODY MASS INDEX DOCD: CPT | Mod: CPTII,S$GLB,, | Performed by: OBSTETRICS & GYNECOLOGY

## 2019-06-19 PROCEDURE — 99213 PR OFFICE/OUTPT VISIT, EST, LEVL III, 20-29 MIN: ICD-10-PCS | Mod: S$GLB,,, | Performed by: OBSTETRICS & GYNECOLOGY

## 2019-06-19 PROCEDURE — 99999 PR PBB SHADOW E&M-EST. PATIENT-LVL III: ICD-10-PCS | Mod: PBBFAC,,, | Performed by: OBSTETRICS & GYNECOLOGY

## 2019-06-19 NOTE — PROGRESS NOTES
CC: breast lump    Rubina Gonzalez is a 39 y.o. female  c/o feels small lump on right breast. Sees Dr. Quarles due to FHx breast cancer, mom who  at age 57, and history of abnormal MMG with biopsy. Had U/S at  in may that was normal. Felt this mass for 2 weeks. Very small, size of a pin head on right. She marked the area with a pen cherie because so small. No nipple discharge, etc.     Past Medical History:   Diagnosis Date    Abnormal Pap smear of cervix 2015    ASCUS / Hpv Neg    Depression     Family history of breast cancer in mother     dx. age 48    Galactorrhea     Hypertension     on Lisinopril    Insomnia     Irregular menstrual bleeding     Migraine     topomax    Panic attacks        Past Surgical History:   Procedure Laterality Date    ABLATION-ENDOMETRIAL-THERMAL N/A 3/28/2017    Performed by [facility]osa [facility] at [facility] OR    BREAST BIOPSY Right 2017    Core Bx  (Path:  Fibrocystic Changes)    CHOLECYSTECTOMY  2006    ENDOMETRIAL ABLATION  2017    Dr Nguyễn Barbosa    [id number] N/A 3/28/2017    Performed by Nguyễn Barbosa MD at [facility] OR       OB History    Para Term  AB Living   4 4 3 1   4   SAB TAB Ectopic Multiple Live Births           4      # Outcome Date GA Lbr Jero/2nd Weight Sex Delivery Anes PTL Lv   4 Term  39w0d  3.6 kg (7 lb 15 oz) F Vag-Spont EPI  DIONNE   3 Term  38w0d  3.43 kg (7 lb 9 oz) F Vag-Spont EPI  DIONNE   2 Term  37w0d  3.572 kg (7 lb 14 oz) F Vag-Spont EPI  DOINNE   1   36w0d  3.402 kg (7 lb 8 oz) F Vag-Spont EPI  DIONNE      Obstetric Comments   Menarche 13       Family History   Problem Relation Age of Onset    Colon cancer Paternal Grandmother     Cancer Paternal Grandmother     Breast cancer Paternal Grandmother     Colon cancer Maternal Grandfather     Cancer Maternal Grandfather     Hypertension Father     Breast cancer Mother 57    Hypertension Mother     Cancer Mother     Breast  "cancer Paternal Aunt     Cancer Paternal Aunt     No Known Problems Brother     No Known Problems Brother     Ovarian cancer Neg Hx        BP (!) 138/96   Ht 5' 3" (1.6 m)   Wt 120.1 kg (264 lb 10.6 oz)   BMI 46.88 kg/m²     ROS:  GENERAL: Denies weight gain or weight loss. Feeling well overall.   SKIN: Denies rash or lesions.   NODES: Denies enlarged lymph nodes.   CHEST: Denies chest pain or shortness of breath.   CARDIOVASCULAR: Denies palpitations or left sided chest pain.   ABDOMEN: No abdominal pain, constipation, diarrhea, nausea, vomiting or rectal bleeding.   BREASTS: The patient performs breast self-examination and denies pain, lumps, or nipple discharge.   HEMATOLOGIC: No easy bruisability or excessive bleeding.  MUSCULOSKELETAL: Denies joint pain or swelling.   NEUROLOGIC: Denies syncope or weakness.   PSYCHIATRIC: Denies depression, anxiety or mood swings.    Physical Exam:    APPEARANCE: Well nourished, well developed, in no acute distress.  AFFECT: WNL, alert and oriented x 3  SKIN: No acne or hirsutism  NODES: No supraclavicular nodes palpated, no axillary nodes palpated  CHEST: Good respiratory effect  BREASTS: left breast normal exam, no massesrigth breast very small- 3 mm area that is right under the skin in marked ring pt provided.     Physical Exam:        Pulmonary/Chest:       SMALL 3 mm area under skin on the right                              ASSESSMENT AND PLAN  Rubina was seen today for breast mass.    Diagnoses and all orders for this visit:    Breast lump  -     Mammo Digital Diagnostic Right w/ Reza; Future  -     Mammo Digital Diagnostic Right w/ Reza        Follow up if symptoms worsen or fail to improve.    Will call with results.   "

## 2019-08-07 ENCOUNTER — TELEPHONE (OUTPATIENT)
Dept: OBSTETRICS AND GYNECOLOGY | Facility: CLINIC | Age: 39
End: 2019-08-07

## 2019-08-07 NOTE — TELEPHONE ENCOUNTER
Pt. Went to [facility] for breast imaging this morning, states she had a lump, saw  when  was out of office.   Pt. Says she needs a biopsy for a possible papilloma. She has put in a call to Willam's office to get that scheduled.   She would like a call from  to review the report. I notified her that we do not have it yet as it was done this morning. Will call her tomorrow when report comes over via fax.    Also states that she had some spotting when wiping on Friday.  None since & no pains. Has not had any bleeding since ablation.  I told her that's probably fine but keep a watch on it & i'll let  know.

## 2019-08-07 NOTE — TELEPHONE ENCOUNTER
Pt had a breast u/s done at , they will be faxing the results over to the office and she would like for  to call her to discuss once she has reviewed them.

## 2019-08-08 NOTE — TELEPHONE ENCOUNTER
Called patient 0830 this morning and left a detailed message on her cell phone  I reviewed her ultrasound report.    Expressed that it looks like the intraductal papilloma and I agree 100% that it needs to be removed.  Patient has an appointment with Dr. Quarles on 08/22 to discuss excision.  Expressed to patient that I 100% agree with the plan.  If she has any questions she  can feel free to call me back.

## 2019-08-15 ENCOUNTER — TELEPHONE (OUTPATIENT)
Dept: OBSTETRICS AND GYNECOLOGY | Facility: CLINIC | Age: 39
End: 2019-08-15

## 2019-08-15 NOTE — TELEPHONE ENCOUNTER
Pt received 's message about her results and has some questions and she would like to discuss the results further with her.

## 2019-08-15 NOTE — TELEPHONE ENCOUNTER
Pt. Notified  is out of the office until Monday.  She is having the breast procedure with  next Thursday.   Would like to speak with  prior to her appt with Willam.

## 2019-08-24 NOTE — ADDENDUM NOTE
Addended by: BRIANNA ORANTES on: 7/27/2018 01:01 PM     Modules accepted: Orders    
abrasion to scapula

## 2019-09-11 ENCOUNTER — PATIENT MESSAGE (OUTPATIENT)
Dept: OBSTETRICS AND GYNECOLOGY | Facility: CLINIC | Age: 39
End: 2019-09-11

## 2019-09-12 ENCOUNTER — TELEPHONE (OUTPATIENT)
Dept: OBSTETRICS AND GYNECOLOGY | Facility: CLINIC | Age: 39
End: 2019-09-12

## 2019-09-12 NOTE — TELEPHONE ENCOUNTER
Dr. Barbosa pt, says for 3 days now she's been experiencing swelling and tenderness on her left breast. Her nipple on her left breast is also very tender and has been also experiencing itchiness. Please call pt and advise, thank you.

## 2019-09-12 NOTE — TELEPHONE ENCOUNTER
"Pt has a cyst in right breast and has gone for several scans but now has swelling and itching in left breast and change to nipple.  She is "freaking out"  Scheduled tomorrow with Joy.   "

## 2019-09-13 ENCOUNTER — OFFICE VISIT (OUTPATIENT)
Dept: OBSTETRICS AND GYNECOLOGY | Facility: CLINIC | Age: 39
End: 2019-09-13
Payer: COMMERCIAL

## 2019-09-13 VITALS
BODY MASS INDEX: 46.54 KG/M2 | DIASTOLIC BLOOD PRESSURE: 87 MMHG | SYSTOLIC BLOOD PRESSURE: 128 MMHG | HEIGHT: 63 IN | WEIGHT: 262.69 LBS

## 2019-09-13 DIAGNOSIS — N64.4 MASTODYNIA OF LEFT BREAST: Primary | ICD-10-CM

## 2019-09-13 DIAGNOSIS — R92.30 DENSE BREAST TISSUE: ICD-10-CM

## 2019-09-13 DIAGNOSIS — Z98.890 HISTORY OF LUMPECTOMY OF RIGHT BREAST: ICD-10-CM

## 2019-09-13 DIAGNOSIS — Z80.3 FAMILY HISTORY OF BREAST CANCER IN MOTHER: ICD-10-CM

## 2019-09-13 DIAGNOSIS — L30.8 PRURITIC DERMATITIS: ICD-10-CM

## 2019-09-13 PROCEDURE — 3074F SYST BP LT 130 MM HG: CPT | Mod: CPTII,S$GLB,, | Performed by: NURSE PRACTITIONER

## 2019-09-13 PROCEDURE — 99999 PR PBB SHADOW E&M-EST. PATIENT-LVL III: CPT | Mod: PBBFAC,,, | Performed by: NURSE PRACTITIONER

## 2019-09-13 PROCEDURE — 3008F PR BODY MASS INDEX (BMI) DOCUMENTED: ICD-10-PCS | Mod: CPTII,S$GLB,, | Performed by: NURSE PRACTITIONER

## 2019-09-13 PROCEDURE — 99213 OFFICE O/P EST LOW 20 MIN: CPT | Mod: S$GLB,,, | Performed by: NURSE PRACTITIONER

## 2019-09-13 PROCEDURE — 3079F DIAST BP 80-89 MM HG: CPT | Mod: CPTII,S$GLB,, | Performed by: NURSE PRACTITIONER

## 2019-09-13 PROCEDURE — 99213 PR OFFICE/OUTPT VISIT, EST, LEVL III, 20-29 MIN: ICD-10-PCS | Mod: S$GLB,,, | Performed by: NURSE PRACTITIONER

## 2019-09-13 PROCEDURE — 3074F PR MOST RECENT SYSTOLIC BLOOD PRESSURE < 130 MM HG: ICD-10-PCS | Mod: CPTII,S$GLB,, | Performed by: NURSE PRACTITIONER

## 2019-09-13 PROCEDURE — 3079F PR MOST RECENT DIASTOLIC BLOOD PRESSURE 80-89 MM HG: ICD-10-PCS | Mod: CPTII,S$GLB,, | Performed by: NURSE PRACTITIONER

## 2019-09-13 PROCEDURE — 3008F BODY MASS INDEX DOCD: CPT | Mod: CPTII,S$GLB,, | Performed by: NURSE PRACTITIONER

## 2019-09-13 PROCEDURE — 99999 PR PBB SHADOW E&M-EST. PATIENT-LVL III: ICD-10-PCS | Mod: PBBFAC,,, | Performed by: NURSE PRACTITIONER

## 2019-09-13 RX ORDER — LINACLOTIDE 72 UG/1
CAPSULE, GELATIN COATED ORAL
COMMUNITY
Start: 2019-09-11 | End: 2020-03-24 | Stop reason: SDUPTHER

## 2019-09-13 NOTE — PROGRESS NOTES
"Chief Complaint: Breast Pain: LEFT     (Dr. Barbosa patient)    Last Breast Imaging (if available):  2019 (at [facility]) - Bilat Diag Breast Mammogram: " Impression: The tissue of both breasts is heterogeneously dense.  Benign appearing calcifications are present in both breasts.  Examination indicates a biopsy marker in the right breast.  No significant masses, calcifications, or other findings are seen in either breast.  There has been no significant interval change.  There is no mammographic abnormality seen in the right breast to correlate with the reported palpable abnormality, however, further workup with ultrasound is recommended."    RIGHT Breast Ultrasound (done 2019 at [facility]):  "A 4 mm oval lesion in the right breast at 6 o'clock noted.  Differential diagnosis of papilloma and appears to have low suspicion for malignancy.  An ultrasound guided biopsy is recommended."      HPI:      Rubina Gonzalez is a 39 y.o.  who presents, with complaints of LEFT  breast tenderness, swelling, and superficial itchiness that began 4 days ago. She is fairly anxious and slightly tearful.  The pain is located to outer quadrant of left breast, near nipple.  She describes the pain as "very tender".  She feels like the areola and area surrounding the areola feel more firm to her than normal.   States the pain is constant and does not peak at midcycle or premenstrually.  She is not on OCP's or HRT.  Patient does not have regular monthly menses since she had her uterine ablation in 2017.      Sees Dr. Quarles due to FHx breast cancer, mom who  at age 57, and history of abnormal MMG with biopsy. Had U/S at  in may that was normal.  2019 - patient had diagnostic right mammogram & u/s of right breast for lump that she had found.  Recommendation was for u/s guided biopsy, which was performed by Dr. Leeroy Quarles on 19.  FINAL PATHOLOGY REPORT:  "simple benign cyst", and  recommended patient " "continue to monitor with him every 6 months.    She complains of skin changes: some mild irritation noted, and now bumpy rash noted from patient scratching it.  She denies nipple discharge.    She is not breastfeeding or pumping.    Denies any recent vigorous or repetitive use of pectoral muscles.  No associated neck, back, or shoulder problems.  No associated fever or erythema.  No recent history of trauma to the chest.  The pain does not affect her ability to perform daily activities.  No complaints regarding right breast today.      LMP:  No LMP recorded. Patient has had an ablation.      Past Medical History:   Diagnosis Date    Abnormal Pap smear of cervix 2015    ASCUS / Hpv Neg    Depression     Family history of breast cancer in mother     dx. age 48    Galactorrhea     Hypertension     on Lisinopril    Insomnia     Irregular menstrual bleeding     Migraine     topomax    Panic attacks        Past Surgical History:   Procedure Laterality Date    ABLATION-ENDOMETRIAL-THERMAL N/A 3/28/2017    Performed by Nguyễn Barbosa MD at [facility] OR    BREAST BIOPSY Right 2017    Core Bx  (Path:  Fibrocystic Changes)    BREAST LUMPECTOMY W/ NEEDLE LOCALIZATION Right 2019    per  (Path Diag: "simple benign cyst" - f/u every 6 mos.    CHOLECYSTECTOMY  2006    ENDOMETRIAL ABLATION  2017    Dr Nguyễn Barbosa    [id number] N/A 3/28/2017    Performed by Nguyễn Barbosa MD at [facility] OR       OB History    Para Term  AB Living   4 4 3 1   4   SAB TAB Ectopic Multiple Live Births           4      # Outcome Date GA Lbr Jero/2nd Weight Sex Delivery Anes PTL Lv   4 Term  39w0d  3.6 kg (7 lb 15 oz) F Vag-Spont EPI  DIONNE   3 Term  38w0d  3.43 kg (7 lb 9 oz) F Vag-Spont EPI  DIONNE   2 Term  37w0d  3.572 kg (7 lb 14 oz) F Vag-Spont EPI  DIONNE   1   36w0d  3.402 kg (7 lb 8 oz) F Vag-Spont EPI  DIONNE      Obstetric Comments   Menarche 13       Family " "History   Problem Relation Age of Onset    Colon cancer Paternal Grandmother     Cancer Paternal Grandmother     Breast cancer Paternal Grandmother     Colon cancer Maternal Grandfather     Cancer Maternal Grandfather     Hypertension Father     Breast cancer Mother 57    Hypertension Mother     Cancer Mother     Breast cancer Paternal Aunt     Cancer Paternal Aunt     No Known Problems Brother     No Known Problems Brother     Ovarian cancer Neg Hx        ROS:     Review of Systems   Constitutional: Negative for chills and fever.   HENT: Negative.    Eyes: Negative.    Respiratory: Negative.    Cardiovascular: Negative.    Gastrointestinal: Negative for abdominal pain, nausea and vomiting.   Endocrine: Negative.    Genitourinary: Negative for pelvic pain, vaginal bleeding, vaginal discharge, vaginal pain and vaginal odor.   Musculoskeletal: Negative.    Integumentary:  Positive for breast skin changes (itching, and rash from scratching) and breast tenderness. Negative for breast mass and nipple discharge. Negative.   Neurological: Negative for headaches.   Hematological: Does not bruise/bleed easily.   Psychiatric/Behavioral: Negative.    Breast: Positive for breast self exam, lump, mastodynia, skin changes (itching, and rash from scratching) and tenderness.Negative for asymmetry, mass and nipple discharge      Physical Exam:     /87   Ht 5' 3" (1.6 m)   Wt 119.2 kg (262 lb 10.9 oz)   BMI 46.53 kg/m²       Physical Exam:   Constitutional: She is oriented to person, place, and time. Vital signs are normal. She appears well-developed and well-nourished.    HENT:   Head: Normocephalic.    Eyes: Pupils are equal, round, and reactive to light.      Pulmonary/Chest: Effort normal and breath sounds normal. Right breast exhibits skin change (scar noted from lumpectomy - healed well). Right breast exhibits no inverted nipple, no mass, no nipple discharge, no tenderness, presence, no bleeding and no " "swelling. Left breast exhibits mass, skin change (mild erythema and superficial dermatitis noted from 1- 9 o'clock of breast; majority of rash appears to be from patient scratching skin.) and tenderness. Left breast exhibits no inverted nipple, no nipple discharge, presence, no bleeding and no swelling. Breasts are symmetrical.            Abdominal: Normal appearance. There is no rigidity.             Musculoskeletal: Normal range of motion and moves all extremeties.       Neurological: She is alert and oriented to person, place, and time.    Skin: Skin is warm and dry. No rash noted.    Psychiatric: She has a normal mood and affect. Her speech is normal and behavior is normal. Judgment and thought content normal. Cognition and memory are normal.       Assessment/Plan:     Mastodynia of left breast  -     US Breast Left Complete; Future; Expected date: 09/13/2019    Dense breast tissue  -     US Breast Left Complete; Future; Expected date: 09/13/2019    Pruritic dermatitis  -     US Breast Left Complete; Future; Expected date: 09/13/2019    Family history of breast cancer in mother  -     US Breast Left Complete; Future; Expected date: 09/13/2019    History of lumpectomy of right breast (PATH: "benign simple cyst")      Counseling:     Follow up if symptoms worsen or fail to improve.     * Did not send Diag mammo order for left breast today since patient had one performed on 08/07/19, but advised pt to let me know if radiologist felt it necessary to repeat diag left mammo when she has u/s done.      * Use of the [employer] Patient Portal discussed and encouraged during today's visit.   * Patient aware she will be notified of any results from today's visit once they have been reviewed by Provider, either via her [facility] patient portal, or telephone call.      "

## 2019-09-17 RX ORDER — ALPRAZOLAM 1 MG/1
1 TABLET ORAL 2 TIMES DAILY
Qty: 30 TABLET | Refills: 0 | Status: SHIPPED | OUTPATIENT
Start: 2019-09-17 | End: 2020-04-17 | Stop reason: SDUPTHER

## 2019-09-18 ENCOUNTER — TELEPHONE (OUTPATIENT)
Dept: OBSTETRICS AND GYNECOLOGY | Facility: CLINIC | Age: 39
End: 2019-09-18

## 2019-09-18 RX ORDER — NITROFURANTOIN 25; 75 MG/1; MG/1
100 CAPSULE ORAL 2 TIMES DAILY
Qty: 14 CAPSULE | Refills: 0 | Status: SHIPPED | OUTPATIENT
Start: 2019-09-18 | End: 2019-09-25

## 2019-09-18 RX ORDER — PHENAZOPYRIDINE HYDROCHLORIDE 200 MG/1
200 TABLET, FILM COATED ORAL 3 TIMES DAILY PRN
Qty: 20 TABLET | Refills: 0 | Status: SHIPPED | OUTPATIENT
Start: 2019-09-18 | End: 2020-02-17

## 2019-09-18 NOTE — TELEPHONE ENCOUNTER
Pt thinks she has a UTI.  C/o severe pain when voiding. Requesting a rx.  Recommended an appt if no improvement in 3 days.     Macrobid and pyridium pended

## 2019-09-20 RX ORDER — SULFAMETHOXAZOLE AND TRIMETHOPRIM 800; 160 MG/1; MG/1
1 TABLET ORAL 2 TIMES DAILY
Qty: 14 TABLET | Refills: 0 | Status: SHIPPED | OUTPATIENT
Start: 2019-09-20 | End: 2019-09-27

## 2019-09-25 ENCOUNTER — PATIENT MESSAGE (OUTPATIENT)
Dept: OBSTETRICS AND GYNECOLOGY | Facility: CLINIC | Age: 39
End: 2019-09-25

## 2020-02-17 ENCOUNTER — OFFICE VISIT (OUTPATIENT)
Dept: OBSTETRICS AND GYNECOLOGY | Facility: CLINIC | Age: 40
End: 2020-02-17
Payer: COMMERCIAL

## 2020-02-17 VITALS
BODY MASS INDEX: 45.7 KG/M2 | HEIGHT: 63 IN | DIASTOLIC BLOOD PRESSURE: 82 MMHG | WEIGHT: 257.94 LBS | SYSTOLIC BLOOD PRESSURE: 128 MMHG

## 2020-02-17 DIAGNOSIS — Z11.51 ENCOUNTER FOR SCREENING FOR HUMAN PAPILLOMAVIRUS (HPV): ICD-10-CM

## 2020-02-17 DIAGNOSIS — Z12.4 ENCOUNTER FOR PAPANICOLAOU SMEAR FOR CERVICAL CANCER SCREENING: ICD-10-CM

## 2020-02-17 DIAGNOSIS — Z01.419 ENCOUNTER FOR GYNECOLOGICAL EXAMINATION: Primary | ICD-10-CM

## 2020-02-17 PROCEDURE — 87624 HPV HI-RISK TYP POOLED RSLT: CPT

## 2020-02-17 PROCEDURE — 3074F SYST BP LT 130 MM HG: CPT | Mod: CPTII,S$GLB,, | Performed by: OBSTETRICS & GYNECOLOGY

## 2020-02-17 PROCEDURE — 3074F PR MOST RECENT SYSTOLIC BLOOD PRESSURE < 130 MM HG: ICD-10-PCS | Mod: CPTII,S$GLB,, | Performed by: OBSTETRICS & GYNECOLOGY

## 2020-02-17 PROCEDURE — 88142 CYTOPATH C/V THIN LAYER: CPT

## 2020-02-17 PROCEDURE — 99999 PR PBB SHADOW E&M-EST. PATIENT-LVL III: CPT | Mod: PBBFAC,,, | Performed by: OBSTETRICS & GYNECOLOGY

## 2020-02-17 PROCEDURE — 99999 PR PBB SHADOW E&M-EST. PATIENT-LVL III: ICD-10-PCS | Mod: PBBFAC,,, | Performed by: OBSTETRICS & GYNECOLOGY

## 2020-02-17 PROCEDURE — 3079F PR MOST RECENT DIASTOLIC BLOOD PRESSURE 80-89 MM HG: ICD-10-PCS | Mod: CPTII,S$GLB,, | Performed by: OBSTETRICS & GYNECOLOGY

## 2020-02-17 PROCEDURE — 99395 PREV VISIT EST AGE 18-39: CPT | Mod: S$GLB,,, | Performed by: OBSTETRICS & GYNECOLOGY

## 2020-02-17 PROCEDURE — 99395 PR PREVENTIVE VISIT,EST,18-39: ICD-10-PCS | Mod: S$GLB,,, | Performed by: OBSTETRICS & GYNECOLOGY

## 2020-02-17 PROCEDURE — 3079F DIAST BP 80-89 MM HG: CPT | Mod: CPTII,S$GLB,, | Performed by: OBSTETRICS & GYNECOLOGY

## 2020-02-18 NOTE — PROGRESS NOTES
"Chief Complaint: well woman exam  Well Woman (Annual Exam, Last pap/hpv 2016 negative, last mammo  [facility]. Also had lumpectomy  by  which was a cyst)      Rubina Gonzalez is a 39 y.o. female  presents for a well woman exam.    She is established.  No complaints.     Cycles: none due to ablation   LMP: No LMP recorded. Patient has had an ablation..    Contraception: The current method of family planning is vasectomy.  Last pap: 2017 normal and hpv neg   Last MM2019 [facility] dx MMG and u/s s/p benign cyst removal   Sees Dr Quarles s/p removal of mass - breast cyst benign   Discussed with Willam pro and con of yearly MRI- pt conidering and will  rediscuss with Willam in March   Pt and her mom BRCA neg        Medication List with Changes/Refills   Current Medications    ALPRAZOLAM (XANAX) 1 MG TABLET    Take 1 tablet (1 mg total) by mouth 2 (two) times daily.    LINZESS 72 MCG CAP CAPSULE       Discontinued Medications    PHENAZOPYRIDINE (PYRIDIUM) 200 MG TABLET    Take 1 tablet (200 mg total) by mouth 3 (three) times daily as needed for Pain.       Past Medical History:   Diagnosis Date    Abnormal Pap smear of cervix 2015    ASCUS / Hpv Neg    Depression     Family history of breast cancer in mother     dx. age 48    Galactorrhea     Hypertension     on Lisinopril    Insomnia     Irregular menstrual bleeding     Migraine     topomax    Panic attacks        Past Surgical History:   Procedure Laterality Date    BREAST BIOPSY Right 2017    Core Bx  (Path:  Fibrocystic Changes)    BREAST LUMPECTOMY W/ NEEDLE LOCALIZATION Right 2019    per  (Path Diag: "simple benign cyst" - f/u every 6 mos.    CHOLECYSTECTOMY  2006    ENDOMETRIAL ABLATION  2017    Dr Nguyễn Barbosa       OB History    Para Term  AB Living   4 4 3 1   4   SAB TAB Ectopic Multiple Live Births           4      # Outcome Date GA Lbr Jero/2nd Weight Sex Delivery Anes PTL Lv " "  4 Term  39w0d  3.6 kg (7 lb 15 oz) F Vag-Spont EPI  DIONNE   3 Term  38w0d  3.43 kg (7 lb 9 oz) F Vag-Spont EPI  DIONNE   2 Term  37w0d  3.572 kg (7 lb 14 oz) F Vag-Spont EPI  DIONNE   1   36w0d  3.402 kg (7 lb 8 oz) F Vag-Spont EPI  DIONNE      Obstetric Comments   Menarche 13       Family History   Problem Relation Age of Onset    Colon cancer Paternal Grandmother     Cancer Paternal Grandmother     Breast cancer Paternal Grandmother     Colon cancer Maternal Grandfather     Cancer Maternal Grandfather     Hypertension Father     Breast cancer Mother 57    Hypertension Mother     Cancer Mother     Breast cancer Paternal Aunt     Cancer Paternal Aunt     No Known Problems Brother     No Known Problems Brother     Ovarian cancer Neg Hx        Social History     Tobacco Use    Smoking status: Never Smoker    Smokeless tobacco: Never Used   Substance Use Topics    Alcohol use: Yes     Comment: Social    Drug use: No         ROS:  GENERAL: Denies weight gain or weight loss. Feeling well overall.   SKIN: Denies rash or lesions.   HEENT: Denies headaches, or vision changes.   CARDIOVASCULAR: Denies palpitations or left sided chest pain.   RESPIRATORY: Denies shortness of breath or dyspnea on exertion.  BREASTS: Denies pain, lumps, or nipple discharge.   ABDOMEN: Denies abdominal pain, constipation, diarrhea, nausea, vomiting, change in appetite or rectal bleeding.   URINARY: Denies frequency, dysuria, hematuria.  NEUROLOGIC: Denies syncope or weakness.   PSYCHIATRIC: Denies depression, anxiety or mood swings.    Physical Exam:  /82   Ht 5' 3" (1.6 m)   Wt 117 kg (257 lb 15 oz)   BMI 45.69 kg/m²     APPEARANCE: Well nourished, well developed, in no acute distress.  AFFECT: WNL, alert and oriented x 3  SKIN: No acne or hirsutism  BREASTS: Symmetrical, no skin changes.                     No nipple discharge. No palpable masses bilaterally  NODES: No inguinal nor axillary " LAD  ABDOMEN: soft Non tender Non distended No masses  PELVIC:   Normal external genitalia without lesions.  Normal hair distribution.   Adequate perineal body, normal urethral meatus. No signif cystocele or rectocele.  Vagina moist and well rugated without lesions or discharge.    Cervix pink, without lesions, discharge or tenderness.     PAP performed   Bimanual exam shows uterus to be normal size, regular, mobile and nontender.  Adnexa without masses or tenderness.    EXTREMITIES: No edema.        ASSESSMENT AND PLAN    Rubina was seen today for well woman.    Diagnoses and all orders for this visit:    Encounter for gynecological examination    Encounter for screening for human papillomavirus (HPV)  -     HPV High Risk Genotypes, PCR    Encounter for Papanicolaou smear for cervical cancer screening  -     Liquid-Based Pap Smear, Screening          Follow up in about 1 year (around 2/17/2021).    Patient was counseled today on A.C.S. Pap guidelines and recommendations for yearly pelvic exams, mammograms and monthly self breast exams; to see her PCP for other health maintenance.     Patient encouraged to register for portal and results will be sent via portal.       Health Maintenance   Topic Date Due    TETANUS VACCINE  04/12/1999    Pap Smear with HPV Cotest  12/12/2019    Lipid Panel  Completed

## 2020-02-21 LAB
HPV HR 12 DNA SPEC QL NAA+PROBE: NEGATIVE
HPV16 AG SPEC QL: NEGATIVE
HPV18 DNA SPEC QL NAA+PROBE: NEGATIVE

## 2020-03-19 LAB
FINAL PATHOLOGIC DIAGNOSIS: NORMAL
Lab: NORMAL

## 2020-03-23 ENCOUNTER — PATIENT MESSAGE (OUTPATIENT)
Dept: OBSTETRICS AND GYNECOLOGY | Facility: CLINIC | Age: 40
End: 2020-03-23

## 2020-03-24 RX ORDER — LINACLOTIDE 72 UG/1
72 CAPSULE, GELATIN COATED ORAL DAILY
Qty: 90 CAPSULE | Refills: 3 | Status: SHIPPED | OUTPATIENT
Start: 2020-03-24 | End: 2020-03-26 | Stop reason: SDUPTHER

## 2020-03-26 ENCOUNTER — PATIENT MESSAGE (OUTPATIENT)
Dept: OBSTETRICS AND GYNECOLOGY | Facility: CLINIC | Age: 40
End: 2020-03-26

## 2020-03-26 RX ORDER — LINACLOTIDE 72 UG/1
72 CAPSULE, GELATIN COATED ORAL DAILY
Qty: 90 CAPSULE | Refills: 2 | Status: SHIPPED | OUTPATIENT
Start: 2020-03-26 | End: 2021-03-09

## 2020-04-17 DIAGNOSIS — F41.9 ANXIETY DISORDER, UNSPECIFIED TYPE: Primary | ICD-10-CM

## 2020-04-17 RX ORDER — ALPRAZOLAM 1 MG/1
1 TABLET ORAL 2 TIMES DAILY
Qty: 30 TABLET | Refills: 0 | Status: SHIPPED | OUTPATIENT
Start: 2020-04-17 | End: 2020-08-31 | Stop reason: SDUPTHER

## 2020-05-08 ENCOUNTER — PATIENT MESSAGE (OUTPATIENT)
Dept: OBSTETRICS AND GYNECOLOGY | Facility: CLINIC | Age: 40
End: 2020-05-08

## 2020-05-08 RX ORDER — NITROFURANTOIN 25; 75 MG/1; MG/1
100 CAPSULE ORAL 2 TIMES DAILY
Qty: 14 CAPSULE | Refills: 0 | Status: SHIPPED | OUTPATIENT
Start: 2020-05-08 | End: 2020-05-15

## 2020-08-19 ENCOUNTER — TELEPHONE (OUTPATIENT)
Dept: OBSTETRICS AND GYNECOLOGY | Facility: CLINIC | Age: 40
End: 2020-08-19

## 2020-08-19 DIAGNOSIS — L75.0 URINARY BODY ODOR: Primary | ICD-10-CM

## 2020-08-20 ENCOUNTER — LAB VISIT (OUTPATIENT)
Dept: LAB | Facility: HOSPITAL | Age: 40
End: 2020-08-20
Attending: OBSTETRICS & GYNECOLOGY
Payer: COMMERCIAL

## 2020-08-20 DIAGNOSIS — L75.0 URINARY BODY ODOR: ICD-10-CM

## 2020-08-20 PROCEDURE — 87186 SC STD MICRODIL/AGAR DIL: CPT

## 2020-08-20 PROCEDURE — 87077 CULTURE AEROBIC IDENTIFY: CPT

## 2020-08-20 PROCEDURE — 87086 URINE CULTURE/COLONY COUNT: CPT

## 2020-08-20 PROCEDURE — 87088 URINE BACTERIA CULTURE: CPT

## 2020-08-20 RX ORDER — NITROFURANTOIN 25; 75 MG/1; MG/1
100 CAPSULE ORAL 2 TIMES DAILY
Qty: 14 CAPSULE | Refills: 0 | Status: SHIPPED | OUTPATIENT
Start: 2020-08-20 | End: 2020-08-27

## 2020-08-20 NOTE — TELEPHONE ENCOUNTER
Pt thinks she has a UTI.  C/o pressure, urgency, and urinary odor.  Started Pyridium last night which has helped.  She is dropping off urine sample for culture.    Macrobid pended

## 2020-08-24 LAB — BACTERIA UR CULT: ABNORMAL

## 2020-08-24 NOTE — PROGRESS NOTES
Erin,   Your urine culture was indeed positive.  Macrobid is the perfect antibiotic. Be sure to finish all 7 days.  Hope you are feeling better.   Dr Barbosa

## 2020-08-31 DIAGNOSIS — F41.9 ANXIETY DISORDER, UNSPECIFIED TYPE: ICD-10-CM

## 2020-08-31 RX ORDER — ALPRAZOLAM 1 MG/1
1 TABLET ORAL 2 TIMES DAILY
Qty: 30 TABLET | Refills: 0 | Status: SHIPPED | OUTPATIENT
Start: 2020-08-31 | End: 2021-02-24 | Stop reason: SDUPTHER

## 2020-08-31 NOTE — TELEPHONE ENCOUNTER
Aide IZQUIERDO Staff 53 minutes ago (1:55 PM)     Received RX refill request for Xanax 1mg tablets, quantity of 30.     Routing comment       [id number] - [place], [place] - [address] AT [facility] [address] [phone]  Aide Kirkland 54 minutes ago (1:54 PM

## 2020-09-17 ENCOUNTER — PATIENT MESSAGE (OUTPATIENT)
Dept: OBSTETRICS AND GYNECOLOGY | Facility: CLINIC | Age: 40
End: 2020-09-17

## 2020-09-17 NOTE — TELEPHONE ENCOUNTER
Please send bilateral mmg and bilateral u/s to [facility] hx breast cancer and personal hx breast cyst benign 2019

## 2020-09-18 ENCOUNTER — TELEPHONE (OUTPATIENT)
Dept: OBSTETRICS AND GYNECOLOGY | Facility: CLINIC | Age: 40
End: 2020-09-18

## 2020-09-18 DIAGNOSIS — Z80.3 FAMILY HISTORY OF BREAST CANCER IN MOTHER: Primary | ICD-10-CM

## 2020-09-18 DIAGNOSIS — Z00.00 ROUTINE GENERAL MEDICAL EXAMINATION AT A HEALTH CARE FACILITY: ICD-10-CM

## 2020-09-18 DIAGNOSIS — R53.81 MALAISE AND FATIGUE: ICD-10-CM

## 2020-09-18 DIAGNOSIS — R92.30 DENSE BREAST TISSUE: ICD-10-CM

## 2020-09-18 DIAGNOSIS — R53.83 MALAISE AND FATIGUE: ICD-10-CM

## 2020-09-18 DIAGNOSIS — N60.01 BILATERAL BREAST CYSTS: ICD-10-CM

## 2020-09-18 DIAGNOSIS — N60.02 BILATERAL BREAST CYSTS: ICD-10-CM

## 2020-09-18 NOTE — TELEPHONE ENCOUNTER
Pt. Asking for labwork orders for general health panel & mammo orders to be faxed to [facility].  Responded to pt. Via the portal.  Entered lab orders for [facility]  Faxed mammo orders to [facility]

## 2020-10-01 ENCOUNTER — LAB VISIT (OUTPATIENT)
Dept: LAB | Facility: HOSPITAL | Age: 40
End: 2020-10-01
Attending: OBSTETRICS & GYNECOLOGY
Payer: COMMERCIAL

## 2020-10-01 DIAGNOSIS — R53.81 MALAISE AND FATIGUE: ICD-10-CM

## 2020-10-01 DIAGNOSIS — R53.83 MALAISE AND FATIGUE: ICD-10-CM

## 2020-10-01 DIAGNOSIS — Z00.00 ROUTINE GENERAL MEDICAL EXAMINATION AT A HEALTH CARE FACILITY: ICD-10-CM

## 2020-10-01 LAB
25(OH)D3+25(OH)D2 SERPL-MCNC: 28 NG/ML (ref 30–96)
ALBUMIN SERPL BCP-MCNC: 3.7 G/DL (ref 3.5–5.2)
ALP SERPL-CCNC: 83 U/L (ref 55–135)
ALT SERPL W/O P-5'-P-CCNC: 20 U/L (ref 10–44)
ANION GAP SERPL CALC-SCNC: 9 MMOL/L (ref 8–16)
AST SERPL-CCNC: 15 U/L (ref 10–40)
BASOPHILS # BLD AUTO: 0.06 K/UL (ref 0–0.2)
BASOPHILS NFR BLD: 0.6 % (ref 0–1.9)
BILIRUB SERPL-MCNC: 0.5 MG/DL (ref 0.1–1)
BUN SERPL-MCNC: 9 MG/DL (ref 6–20)
CALCIUM SERPL-MCNC: 8.6 MG/DL (ref 8.7–10.5)
CHLORIDE SERPL-SCNC: 105 MMOL/L (ref 95–110)
CO2 SERPL-SCNC: 25 MMOL/L (ref 23–29)
CREAT SERPL-MCNC: 0.8 MG/DL (ref 0.5–1.4)
DIFFERENTIAL METHOD: ABNORMAL
EOSINOPHIL # BLD AUTO: 0.3 K/UL (ref 0–0.5)
EOSINOPHIL NFR BLD: 2.9 % (ref 0–8)
ERYTHROCYTE [DISTWIDTH] IN BLOOD BY AUTOMATED COUNT: 13.5 % (ref 11.5–14.5)
EST. GFR  (AFRICAN AMERICAN): >60 ML/MIN/1.73 M^2
EST. GFR  (NON AFRICAN AMERICAN): >60 ML/MIN/1.73 M^2
GLUCOSE SERPL-MCNC: 96 MG/DL (ref 70–110)
HCT VFR BLD AUTO: 40.7 % (ref 37–48.5)
HGB BLD-MCNC: 13.5 G/DL (ref 12–16)
IMM GRANULOCYTES # BLD AUTO: 0.04 K/UL (ref 0–0.04)
IMM GRANULOCYTES NFR BLD AUTO: 0.4 % (ref 0–0.5)
LYMPHOCYTES # BLD AUTO: 1.4 K/UL (ref 1–4.8)
LYMPHOCYTES NFR BLD: 13.5 % (ref 18–48)
MCH RBC QN AUTO: 29.7 PG (ref 27–31)
MCHC RBC AUTO-ENTMCNC: 33.2 G/DL (ref 32–36)
MCV RBC AUTO: 90 FL (ref 82–98)
MONOCYTES # BLD AUTO: 0.6 K/UL (ref 0.3–1)
MONOCYTES NFR BLD: 5.5 % (ref 4–15)
NEUTROPHILS # BLD AUTO: 8.2 K/UL (ref 1.8–7.7)
NEUTROPHILS NFR BLD: 77.1 % (ref 38–73)
NRBC BLD-RTO: 0 /100 WBC
PLATELET # BLD AUTO: 298 K/UL (ref 150–350)
PMV BLD AUTO: 11.4 FL (ref 9.2–12.9)
POTASSIUM SERPL-SCNC: 4 MMOL/L (ref 3.5–5.1)
PROT SERPL-MCNC: 7.2 G/DL (ref 6–8.4)
RBC # BLD AUTO: 4.55 M/UL (ref 4–5.4)
SODIUM SERPL-SCNC: 139 MMOL/L (ref 136–145)
TSH SERPL DL<=0.005 MIU/L-ACNC: 1.86 UIU/ML (ref 0.4–4)
WBC # BLD AUTO: 10.57 K/UL (ref 3.9–12.7)

## 2020-10-01 PROCEDURE — 80053 COMPREHEN METABOLIC PANEL: CPT

## 2020-10-01 PROCEDURE — 85025 COMPLETE CBC W/AUTO DIFF WBC: CPT

## 2020-10-01 PROCEDURE — 84443 ASSAY THYROID STIM HORMONE: CPT

## 2020-10-01 PROCEDURE — 82306 VITAMIN D 25 HYDROXY: CPT

## 2020-10-05 NOTE — PROGRESS NOTES
I have received your lab work and your lab work is normal but your vitamin D is low.    Ideally vitamin-D level should be above 40.  I recommend vitamin-D 2000 IUs daily.  You can get this at your local pharmacy/supermarket.    After taking vitamin-D supplements for 6 months, we can recheck this level.  Most people with low vitamin-D have no symptoms, however vitamin-D has been associated with a host of condition ssuch as osteoporosis, heart disease, certain cancers, autoimmune diseases and neurological diseases such as multiple sclerosis, depression and fatigue.  For these reasons it is important to take a Vit D supplement daily.    Also be sure to let Dr De Los Santos evaluate these labs and look at your calcium which is barely low. She may want to repeat that.  Let me know if you have any questions or concerns,  Dr Barbosa

## 2021-01-13 ENCOUNTER — TELEPHONE (OUTPATIENT)
Dept: OBSTETRICS AND GYNECOLOGY | Facility: CLINIC | Age: 41
End: 2021-01-13

## 2021-01-13 DIAGNOSIS — R10.2 PELVIC PAIN IN FEMALE: Primary | ICD-10-CM

## 2021-02-24 DIAGNOSIS — F41.9 ANXIETY DISORDER, UNSPECIFIED TYPE: ICD-10-CM

## 2021-02-24 RX ORDER — ALPRAZOLAM 1 MG/1
1 TABLET ORAL 2 TIMES DAILY
Qty: 30 TABLET | Refills: 0 | Status: SHIPPED | OUTPATIENT
Start: 2021-02-24 | End: 2022-02-24 | Stop reason: SDUPTHER

## 2021-02-26 ENCOUNTER — OFFICE VISIT (OUTPATIENT)
Dept: OBSTETRICS AND GYNECOLOGY | Facility: CLINIC | Age: 41
End: 2021-02-26
Attending: OBSTETRICS & GYNECOLOGY
Payer: COMMERCIAL

## 2021-02-26 VITALS
BODY MASS INDEX: 45.7 KG/M2 | DIASTOLIC BLOOD PRESSURE: 84 MMHG | HEIGHT: 63 IN | WEIGHT: 257.94 LBS | SYSTOLIC BLOOD PRESSURE: 144 MMHG | TEMPERATURE: 98 F

## 2021-02-26 DIAGNOSIS — Z01.419 ENCOUNTER FOR GYNECOLOGICAL EXAMINATION: Primary | ICD-10-CM

## 2021-02-26 PROCEDURE — 3077F PR MOST RECENT SYSTOLIC BLOOD PRESSURE >= 140 MM HG: ICD-10-PCS | Mod: CPTII,S$GLB,, | Performed by: OBSTETRICS & GYNECOLOGY

## 2021-02-26 PROCEDURE — 3079F DIAST BP 80-89 MM HG: CPT | Mod: CPTII,S$GLB,, | Performed by: OBSTETRICS & GYNECOLOGY

## 2021-02-26 PROCEDURE — 3077F SYST BP >= 140 MM HG: CPT | Mod: CPTII,S$GLB,, | Performed by: OBSTETRICS & GYNECOLOGY

## 2021-02-26 PROCEDURE — 99395 PREV VISIT EST AGE 18-39: CPT | Mod: S$GLB,,, | Performed by: OBSTETRICS & GYNECOLOGY

## 2021-02-26 PROCEDURE — 3079F PR MOST RECENT DIASTOLIC BLOOD PRESSURE 80-89 MM HG: ICD-10-PCS | Mod: CPTII,S$GLB,, | Performed by: OBSTETRICS & GYNECOLOGY

## 2021-02-26 PROCEDURE — 1126F PR PAIN SEVERITY QUANTIFIED, NO PAIN PRESENT: ICD-10-PCS | Mod: S$GLB,,, | Performed by: OBSTETRICS & GYNECOLOGY

## 2021-02-26 PROCEDURE — 99999 PR PBB SHADOW E&M-EST. PATIENT-LVL III: CPT | Mod: PBBFAC,,, | Performed by: OBSTETRICS & GYNECOLOGY

## 2021-02-26 PROCEDURE — 3008F PR BODY MASS INDEX (BMI) DOCUMENTED: ICD-10-PCS | Mod: CPTII,S$GLB,, | Performed by: OBSTETRICS & GYNECOLOGY

## 2021-02-26 PROCEDURE — 1126F AMNT PAIN NOTED NONE PRSNT: CPT | Mod: S$GLB,,, | Performed by: OBSTETRICS & GYNECOLOGY

## 2021-02-26 PROCEDURE — 99395 PR PREVENTIVE VISIT,EST,18-39: ICD-10-PCS | Mod: S$GLB,,, | Performed by: OBSTETRICS & GYNECOLOGY

## 2021-02-26 PROCEDURE — 3008F BODY MASS INDEX DOCD: CPT | Mod: CPTII,S$GLB,, | Performed by: OBSTETRICS & GYNECOLOGY

## 2021-02-26 PROCEDURE — 99999 PR PBB SHADOW E&M-EST. PATIENT-LVL III: ICD-10-PCS | Mod: PBBFAC,,, | Performed by: OBSTETRICS & GYNECOLOGY

## 2021-02-26 RX ORDER — ESCITALOPRAM OXALATE 10 MG/1
10 TABLET ORAL DAILY
COMMUNITY
Start: 2021-02-03 | End: 2023-09-13 | Stop reason: DRUGHIGH

## 2021-03-09 ENCOUNTER — PATIENT MESSAGE (OUTPATIENT)
Dept: OBSTETRICS AND GYNECOLOGY | Facility: CLINIC | Age: 41
End: 2021-03-09

## 2021-03-15 ENCOUNTER — PATIENT MESSAGE (OUTPATIENT)
Dept: OBSTETRICS AND GYNECOLOGY | Facility: CLINIC | Age: 41
End: 2021-03-15

## 2021-03-15 RX ORDER — DOXYCYCLINE 100 MG/1
100 CAPSULE ORAL 2 TIMES DAILY
Qty: 14 CAPSULE | Refills: 0 | Status: SHIPPED | OUTPATIENT
Start: 2021-03-15 | End: 2022-02-24

## 2021-04-16 ENCOUNTER — PATIENT MESSAGE (OUTPATIENT)
Dept: RESEARCH | Facility: HOSPITAL | Age: 41
End: 2021-04-16

## 2021-05-28 NOTE — TELEPHONE ENCOUNTER
- Please bring in all of your compression that you have at home to the appointment with Heather on 5/29/19.     Call us if your compression wraps fall more than 1-2 inches below the bend of the knee. Call if they are too painful. Call if they get wet. If it is a weekend and the wraps fall down, are too painful, or get wet take the wraps off and put on another form of compression. Compression such as velcro wraps, compression stockings, short stretch bandages, or tubular compression. Apply one of these until you can be seen in clinic. Please call us if you have any questions [phone].    - Treatment:  Layered Compression Bandaging (2-layer)    What is it?  The layered compression bandaging has a layer of absorbent material that will soak up drainage.     Why we do it.   This is done to treat swelling, wounds, or both.  This will in turn help circulation and healing.    How to care for your bandages.  The wraps need to be kept dry. If  the wraps become wet, remove them and call the clinic to have another wrap applied.    What to expect.  It is common for the wraps to be uncomfortable at the beginning. The first two days are usually the hardest; then they will become more comfortable.       Elevating your legs will help the discomfort. Try to elevate your legs as much as possible.    If rest and elevation does not help your discomfort, call your provider.  If your provider is not available you can remove the wrap and leave a message for further instructions.    - Swelling and Compression Therapy    Swelling in the legs can be caused by many reasons. No matter what the reason, treatment usually includes some type of compression. This may be done with a support sock, dressing, ace wrap, or layered wraps.     It is important to treat the swelling for many reasons. If the swelling is not treated you may develop blisters that can lead to ulcerations. This is caused when extra fluid goes into tissue causing damage and  Pt called to update Lesley and  on her appt with the breast surgeon.    blocking blood flow to the tissue.     It is important that you wear your compression every day, including days that you will be seen in clinic.     Compression is often the most important part of treating leg wounds. Without controlling the swelling it is often not possible to heal wounds.     Going without compression for even brief periods of time can be damaging to your legs and your health.  Your compression should be put on first thing in the morning. Take the compression off at night only when instructed by your care provider to do so. Sometimes wearing compression 24 hours a day will be recommended.       If you are having difficulty wearing your compression it is important to notify your care provider so that other options may be reviewed.    Thank you for choosing [employer]. Please call us if you have any questions [phone].

## 2021-09-03 ENCOUNTER — PATIENT MESSAGE (OUTPATIENT)
Dept: OBSTETRICS AND GYNECOLOGY | Facility: CLINIC | Age: 41
End: 2021-09-03

## 2021-09-03 RX ORDER — NITROFURANTOIN 25; 75 MG/1; MG/1
100 CAPSULE ORAL 2 TIMES DAILY
Qty: 14 CAPSULE | Refills: 0 | Status: SHIPPED | OUTPATIENT
Start: 2021-09-03 | End: 2021-09-10

## 2021-10-08 ENCOUNTER — PATIENT MESSAGE (OUTPATIENT)
Dept: OBSTETRICS AND GYNECOLOGY | Facility: CLINIC | Age: 41
End: 2021-10-08

## 2021-10-08 DIAGNOSIS — N60.02 BILATERAL BREAST CYSTS: ICD-10-CM

## 2021-10-08 DIAGNOSIS — Z80.3 FAMILY HISTORY OF BREAST CANCER IN MOTHER: Primary | ICD-10-CM

## 2021-10-08 DIAGNOSIS — N60.01 BILATERAL BREAST CYSTS: ICD-10-CM

## 2022-02-24 ENCOUNTER — OFFICE VISIT (OUTPATIENT)
Dept: OBSTETRICS AND GYNECOLOGY | Facility: CLINIC | Age: 42
End: 2022-02-24
Attending: OBSTETRICS & GYNECOLOGY
Payer: COMMERCIAL

## 2022-02-24 VITALS
SYSTOLIC BLOOD PRESSURE: 142 MMHG | DIASTOLIC BLOOD PRESSURE: 84 MMHG | WEIGHT: 266.75 LBS | HEIGHT: 63 IN | BODY MASS INDEX: 47.27 KG/M2

## 2022-02-24 DIAGNOSIS — N90.7 VULVAR CYST: ICD-10-CM

## 2022-02-24 DIAGNOSIS — F41.9 ANXIETY DISORDER, UNSPECIFIED TYPE: ICD-10-CM

## 2022-02-24 DIAGNOSIS — Z01.419 ENCOUNTER FOR GYNECOLOGICAL EXAMINATION: Primary | ICD-10-CM

## 2022-02-24 PROCEDURE — 3008F BODY MASS INDEX DOCD: CPT | Mod: CPTII,S$GLB,, | Performed by: OBSTETRICS & GYNECOLOGY

## 2022-02-24 PROCEDURE — 3008F PR BODY MASS INDEX (BMI) DOCUMENTED: ICD-10-PCS | Mod: CPTII,S$GLB,, | Performed by: OBSTETRICS & GYNECOLOGY

## 2022-02-24 PROCEDURE — 3077F PR MOST RECENT SYSTOLIC BLOOD PRESSURE >= 140 MM HG: ICD-10-PCS | Mod: CPTII,S$GLB,, | Performed by: OBSTETRICS & GYNECOLOGY

## 2022-02-24 PROCEDURE — 99396 PREV VISIT EST AGE 40-64: CPT | Mod: S$GLB,,, | Performed by: OBSTETRICS & GYNECOLOGY

## 2022-02-24 PROCEDURE — 3077F SYST BP >= 140 MM HG: CPT | Mod: CPTII,S$GLB,, | Performed by: OBSTETRICS & GYNECOLOGY

## 2022-02-24 PROCEDURE — 1159F PR MEDICATION LIST DOCUMENTED IN MEDICAL RECORD: ICD-10-PCS | Mod: CPTII,S$GLB,, | Performed by: OBSTETRICS & GYNECOLOGY

## 2022-02-24 PROCEDURE — 1159F MED LIST DOCD IN RCRD: CPT | Mod: CPTII,S$GLB,, | Performed by: OBSTETRICS & GYNECOLOGY

## 2022-02-24 PROCEDURE — 3079F PR MOST RECENT DIASTOLIC BLOOD PRESSURE 80-89 MM HG: ICD-10-PCS | Mod: CPTII,S$GLB,, | Performed by: OBSTETRICS & GYNECOLOGY

## 2022-02-24 PROCEDURE — 99396 PR PREVENTIVE VISIT,EST,40-64: ICD-10-PCS | Mod: S$GLB,,, | Performed by: OBSTETRICS & GYNECOLOGY

## 2022-02-24 PROCEDURE — 3079F DIAST BP 80-89 MM HG: CPT | Mod: CPTII,S$GLB,, | Performed by: OBSTETRICS & GYNECOLOGY

## 2022-02-24 PROCEDURE — 99999 PR PBB SHADOW E&M-EST. PATIENT-LVL III: CPT | Mod: PBBFAC,,, | Performed by: OBSTETRICS & GYNECOLOGY

## 2022-02-24 PROCEDURE — 99999 PR PBB SHADOW E&M-EST. PATIENT-LVL III: ICD-10-PCS | Mod: PBBFAC,,, | Performed by: OBSTETRICS & GYNECOLOGY

## 2022-02-24 RX ORDER — ALPRAZOLAM 1 MG/1
1 TABLET ORAL 2 TIMES DAILY
Qty: 30 TABLET | Refills: 0 | Status: SHIPPED | OUTPATIENT
Start: 2022-02-24 | End: 2023-03-30

## 2022-02-24 RX ORDER — DOXYCYCLINE 100 MG/1
100 CAPSULE ORAL EVERY 12 HOURS
Qty: 14 CAPSULE | Refills: 1 | Status: SHIPPED | OUTPATIENT
Start: 2022-02-24 | End: 2022-03-03

## 2022-02-24 NOTE — PROGRESS NOTES
LMP: No LMP recorded. Patient has had an ablation..    Contraception: The current method of family planning is Vasectomy  Meds per MD: Xanax    Last Pap: 2020 pap & hpv negative  Last MM21 birads 2 [facility], T-C 19.8%

## 2022-02-24 NOTE — PROGRESS NOTES
"Chief Complaint: well woman exam  Annual Exam    She is established    Rubina Gonzalez is a 41 y.o. female  presents for a well woman exam.     c/o small bump on right labia minora  Sees Dr Quarles and mom had breast cancer and pt s/p removal of mass - breast cyst benign   Pt and her mom BRCA neg    ROS:  No abdominal pain. No vaginal discharge  No breast pain or masses, No rectal bleeding       Cycles: none ablation   LMP: No LMP recorded. Patient has had an ablation..    Contraception: The current method of family planning is Vasectomy  Meds per MD: Xanax     Last Pap: 2020 pap & hpv negative  Last MM21 birads 2 [facility], T-C 19.8%    Past Medical History:   Diagnosis Date    Abnormal Pap smear of cervix 2015    ASCUS / Hpv Neg    COVID-19 virus infection 2020    Depression     Family history of breast cancer in mother     dx. age 48    Galactorrhea     Hypertension     on Lisinopril    Insomnia     Irregular menstrual bleeding     Migraine     topomax    Panic attacks        Past Surgical History:   Procedure Laterality Date    BREAST BIOPSY Right 2017    Core Bx  (Path:  Fibrocystic Changes)    BREAST LUMPECTOMY W/ NEEDLE LOCALIZATION Right 2019    per  (Path Diag: "simple benign cyst" - f/u every 6 mos.    CHOLECYSTECTOMY  2006    ENDOMETRIAL ABLATION  2017    Dr Adrian Bone       OB History    Para Term  AB Living   4 4 3 1   4   SAB IAB Ectopic Multiple Live Births           4      # Outcome Date GA Lbr Jero/2nd Weight Sex Delivery Anes PTL Lv   4 Term  39w0d  3.6 kg (7 lb 15 oz) F Vag-Spont EPI  DIONNE   3 Term  38w0d  3.43 kg (7 lb 9 oz) F Vag-Spont EPI  DIONNE   2 Term  37w0d  3.572 kg (7 lb 14 oz) F Vag-Spont EPI  DIONNE   1   36w0d  3.402 kg (7 lb 8 oz) F Vag-Spont EPI  DIONNE      Obstetric Comments   Menarche 13       Family History   Problem Relation Age of Onset    Colon cancer Paternal Grandmother     Cancer " "Paternal Grandmother     Breast cancer Paternal Grandmother     Colon cancer Maternal Grandfather     Cancer Maternal Grandfather     Hypertension Father     Breast cancer Mother 57    Hypertension Mother     Cancer Mother     Breast cancer Paternal Aunt     Cancer Paternal Aunt     No Known Problems Brother     No Known Problems Brother     Depression Daughter     Depression Daughter     No Known Problems Daughter     No Known Problems Daughter     Ovarian cancer Neg Hx        Social History     Tobacco Use    Smoking status: Never Smoker    Smokeless tobacco: Never Used   Substance Use Topics    Alcohol use: Yes     Comment: Social    Drug use: No         Physical Exam:  BP (!) 142/84   Ht 5' 3" (1.6 m)   Wt 121 kg (266 lb 12.1 oz)   BMI 47.25 kg/m²     APPEARANCE: Well nourished, well developed, in no acute distress.  AFFECT: WNL, alert and oriented x 3  SKIN: No acne or hirsutism  BREASTS: Symmetrical, no skin changes.                      No nipple discharge.   No palpable masses bilaterally  NODES: No inguinal nor axillary LAD  ABDOMEN: soft Non tender Non distended No masses  PELVIC:   Normal external genitalia withsmall sebaceous vs fluid filled cyst 7mm in right labia - tender to touch No erythema    Normal hair distribution.   Adequate perineal body, normal urethral meatus.   No signif cystocele or rectocele.  Vagina moist and well rugated without lesions or discharge.    Cervix pink, without lesions, discharge or tenderness.     PAP not performed   Bimanual exam shows uterus to be normal size, regular, mobile and nontender.    Adnexa without masses or tenderness.    EXTREMITIES: No edema.        ASSESSMENT AND PLAN  Rubina was seen today for annual exam.    Diagnoses and all orders for this visit:    Encounter for gynecological examination   Pt to follw up with Dr Quarles- considering MRI    Anxiety disorder, unspecified type  -     ALPRAZolam (XANAX) 1 MG tablet; Take 1 tablet (1 " mg total) by mouth 2 (two) times daily.    Vulvar cyst  -     doxycycline (MONODOX) 100 MG capsule; Take 1 capsule (100 mg total) by mouth every 12 (twelve) hours. MAY REFILL ONCE for 7 days        Follow up in about 1 year (around 2/24/2023) for annual.     Patient was counseled today on A.C.S. Pap guidelines and recommendations for yearly pelvic exams, mammograms and monthly self breast exams; to see her PCP for other health maintenance.     Patient encouraged to register for portal and results will be sent via portal.       Health Maintenance   Topic Date Due    Hepatitis C Screening  Never done    TETANUS VACCINE  Never done    Mammogram  12/21/2022    Lipid Panel  Completed

## 2022-03-21 ENCOUNTER — TELEPHONE (OUTPATIENT)
Dept: OBSTETRICS AND GYNECOLOGY | Facility: CLINIC | Age: 42
End: 2022-03-21
Payer: COMMERCIAL

## 2022-03-21 ENCOUNTER — LAB VISIT (OUTPATIENT)
Dept: LAB | Facility: HOSPITAL | Age: 42
End: 2022-03-21
Attending: OBSTETRICS & GYNECOLOGY
Payer: COMMERCIAL

## 2022-03-21 DIAGNOSIS — R30.0 DYSURIA: ICD-10-CM

## 2022-03-21 DIAGNOSIS — R30.0 DYSURIA: Primary | ICD-10-CM

## 2022-03-21 PROCEDURE — 87077 CULTURE AEROBIC IDENTIFY: CPT | Performed by: OBSTETRICS & GYNECOLOGY

## 2022-03-21 PROCEDURE — 87186 SC STD MICRODIL/AGAR DIL: CPT | Performed by: OBSTETRICS & GYNECOLOGY

## 2022-03-21 PROCEDURE — 87086 URINE CULTURE/COLONY COUNT: CPT | Performed by: OBSTETRICS & GYNECOLOGY

## 2022-03-21 PROCEDURE — 87088 URINE BACTERIA CULTURE: CPT | Performed by: OBSTETRICS & GYNECOLOGY

## 2022-03-21 RX ORDER — PHENAZOPYRIDINE HYDROCHLORIDE 200 MG/1
200 TABLET, FILM COATED ORAL 3 TIMES DAILY PRN
Qty: 20 TABLET | Refills: 0 | Status: SHIPPED | OUTPATIENT
Start: 2022-03-21 | End: 2022-07-07

## 2022-03-21 RX ORDER — NITROFURANTOIN 25; 75 MG/1; MG/1
100 CAPSULE ORAL 2 TIMES DAILY
Qty: 14 CAPSULE | Refills: 0 | Status: SHIPPED | OUTPATIENT
Start: 2022-03-21 | End: 2022-03-28

## 2022-03-21 NOTE — TELEPHONE ENCOUNTER
Pt thinks she has a UTI.  C/o dysuria and urinary odor. Took  Pyridium and has already started feeling improvement.  Requesting rx.  Dropping off urine sample this afternoon.  Aware to start abx after giving sample.

## 2022-03-23 LAB — BACTERIA UR CULT: ABNORMAL

## 2022-03-24 NOTE — PROGRESS NOTES
Elver Khan,  You do have a UTI and you are on the correct antibiotic.  You should be starting to feel better by now and if you are not please let me know.  Most importantly, please be sure to finish all of your antibiotics.  Let me know if you have any questions or concerns,  Dr Barbosa

## 2022-05-01 ENCOUNTER — PATIENT MESSAGE (OUTPATIENT)
Dept: OBSTETRICS AND GYNECOLOGY | Facility: CLINIC | Age: 42
End: 2022-05-01
Payer: COMMERCIAL

## 2022-05-16 ENCOUNTER — PATIENT MESSAGE (OUTPATIENT)
Dept: OBSTETRICS AND GYNECOLOGY | Facility: CLINIC | Age: 42
End: 2022-05-16
Payer: COMMERCIAL

## 2022-05-18 RX ORDER — HYDROCORTISONE ACETATE PRAMOXINE HCL 1; 1 G/100G; G/100G
CREAM TOPICAL 3 TIMES DAILY
Qty: 30 G | Refills: 3 | Status: SHIPPED | OUTPATIENT
Start: 2022-05-18 | End: 2023-07-31

## 2022-05-20 ENCOUNTER — OFFICE VISIT (OUTPATIENT)
Dept: OBSTETRICS AND GYNECOLOGY | Facility: CLINIC | Age: 42
End: 2022-05-20
Attending: OBSTETRICS & GYNECOLOGY
Payer: COMMERCIAL

## 2022-05-20 DIAGNOSIS — E66.01 CLASS 3 SEVERE OBESITY WITH BODY MASS INDEX (BMI) OF 45.0 TO 49.9 IN ADULT, UNSPECIFIED OBESITY TYPE, UNSPECIFIED WHETHER SERIOUS COMORBIDITY PRESENT: Primary | ICD-10-CM

## 2022-05-20 DIAGNOSIS — N39.3 STRESS INCONTINENCE, FEMALE: ICD-10-CM

## 2022-05-20 PROCEDURE — 4010F PR ACE/ARB THEARPY RXD/TAKEN: ICD-10-PCS | Mod: CPTII,95,, | Performed by: OBSTETRICS & GYNECOLOGY

## 2022-05-20 PROCEDURE — 4010F ACE/ARB THERAPY RXD/TAKEN: CPT | Mod: CPTII,95,, | Performed by: OBSTETRICS & GYNECOLOGY

## 2022-05-20 PROCEDURE — 99213 PR OFFICE/OUTPT VISIT, EST, LEVL III, 20-29 MIN: ICD-10-PCS | Mod: 95,,, | Performed by: OBSTETRICS & GYNECOLOGY

## 2022-05-20 PROCEDURE — 99213 OFFICE O/P EST LOW 20 MIN: CPT | Mod: 95,,, | Performed by: OBSTETRICS & GYNECOLOGY

## 2022-05-20 NOTE — PROGRESS NOTES
The patient location is: [facility]  The chief complaint leading to consultation is:weight gain  Visit type: audiovisual  Total time spent with patient: 15 min    Each patient to whom he or she provides medical services by telemedicine is:  (1) informed of the relationship between the physician and patient and the respective role of any other health care provider with respect to management of the patient; and (2) notified that he or she may decline to receive medical services by telemedicine and may withdraw from such care at any time.    Notes:     Chief Complaint: weight gain and JESSE     HPI:      Rubina Gonzalez is a 42 y.o.  who presents complaining of : weight gain and JESSE  Patient is a 42-year-old  requesting consultation for weight loss.  Patient saw her primary care doctor Dr. De Los Santos with worsening hypertension.  Was started on 2 blood pressure medicines.  Dr. De Los Santos had recommended for her to lose weight but patient is unsure how to do so.  Patient lost 60 lb with Medi weight loss in the past but has subsequently gained it all back.  Patient reaching out for recommendation.  Also with worsening JESSE and leaking with cough and sneeze etc over the past year.    Patient sees [facility] for mammograms and ultrasounds.  Family history of breast cancer.  Mom BRCA negative.       No LMP recorded. Patient has had an ablation.      ROS:     GENERAL: Denies fevers or chills. Feeling well overall.   ABDOMEN: Denies abdominal pain, constipation, diarrhea, nausea, vomiting, change in appetite.   URINARY: Denies frequency, dysuria, hematuria.  Worsening stress urinary incontinence and hemorrhoids over the past year.  GYNECOLOGIC: See HPI.  NEUROLOGIC: Denies syncope or weakness.     Physical Exam:      PHYSICAL EXAM:  There were no vitals taken for this visit.  There is no height or weight on file to calculate BMI.     APPEARANCE: Well nourished, well developed, in no acute distress.  Exam deferred due to  televisit    Results:          Assessment/Plan:     Class 3 severe obesity with body mass index (BMI) of 45.0 to 49.9 in adult, unspecified obesity type, unspecified whether serious comorbidity present  -     Ambulatory referral/consult to Bariatric Medicine; Future; Expected date: 05/27/2022  Will refer to bariatric medicine for eval and recommendations   There are other options the besides surgery and they will help guide her as what is best for her.     Stress incontinence, female   Overall your lesion on room incontinence as well and referral to physical therapy for stress incontinence.  We also discussed that increase weight gain over the past year may also be contributing to the worsening of the stress incontinence over this past year.  Patient agrees and would like to try weight loss her to further workup and treatment for stress incontinence.        Counseling:       Use of the [employer] Patient Portal discussed and encouraged during today's visit.

## 2022-07-06 ENCOUNTER — TELEPHONE (OUTPATIENT)
Dept: OBSTETRICS AND GYNECOLOGY | Facility: CLINIC | Age: 42
End: 2022-07-06
Payer: COMMERCIAL

## 2022-07-06 NOTE — TELEPHONE ENCOUNTER
Pt states she found a huge lump in vaginal area towards buttocks.  Has a h/o cysts.  Took a round of abx last week for a cyst.  This one is hard and not painful.  Inbetween a quarter and golf ball and slightly red.  Requesting an appt. Scheduled tomorrow with Dr. Barbosa.

## 2022-07-07 ENCOUNTER — OFFICE VISIT (OUTPATIENT)
Dept: OBSTETRICS AND GYNECOLOGY | Facility: CLINIC | Age: 42
End: 2022-07-07
Attending: OBSTETRICS & GYNECOLOGY
Payer: COMMERCIAL

## 2022-07-07 VITALS — BODY MASS INDEX: 47.65 KG/M2 | HEIGHT: 63 IN | WEIGHT: 268.94 LBS

## 2022-07-07 DIAGNOSIS — N90.89 VULVAR HEMATOMA: Primary | ICD-10-CM

## 2022-07-07 PROCEDURE — 3008F BODY MASS INDEX DOCD: CPT | Mod: CPTII,S$GLB,, | Performed by: OBSTETRICS & GYNECOLOGY

## 2022-07-07 PROCEDURE — 99999 PR PBB SHADOW E&M-EST. PATIENT-LVL II: CPT | Mod: PBBFAC,,, | Performed by: OBSTETRICS & GYNECOLOGY

## 2022-07-07 PROCEDURE — 99999 PR PBB SHADOW E&M-EST. PATIENT-LVL II: ICD-10-PCS | Mod: PBBFAC,,, | Performed by: OBSTETRICS & GYNECOLOGY

## 2022-07-07 PROCEDURE — 4010F ACE/ARB THERAPY RXD/TAKEN: CPT | Mod: CPTII,S$GLB,, | Performed by: OBSTETRICS & GYNECOLOGY

## 2022-07-07 PROCEDURE — 99213 OFFICE O/P EST LOW 20 MIN: CPT | Mod: 25,S$GLB,, | Performed by: OBSTETRICS & GYNECOLOGY

## 2022-07-07 PROCEDURE — 56405 PR I&D OF VULVA/PERINEUM ABSCESS: ICD-10-PCS | Mod: S$GLB,,, | Performed by: OBSTETRICS & GYNECOLOGY

## 2022-07-07 PROCEDURE — 3008F PR BODY MASS INDEX (BMI) DOCUMENTED: ICD-10-PCS | Mod: CPTII,S$GLB,, | Performed by: OBSTETRICS & GYNECOLOGY

## 2022-07-07 PROCEDURE — 56405 I&D VULVA/PERINEAL ABSCESS: CPT | Mod: S$GLB,,, | Performed by: OBSTETRICS & GYNECOLOGY

## 2022-07-07 PROCEDURE — 99213 PR OFFICE/OUTPT VISIT, EST, LEVL III, 20-29 MIN: ICD-10-PCS | Mod: 25,S$GLB,, | Performed by: OBSTETRICS & GYNECOLOGY

## 2022-07-07 PROCEDURE — 4010F PR ACE/ARB THEARPY RXD/TAKEN: ICD-10-PCS | Mod: CPTII,S$GLB,, | Performed by: OBSTETRICS & GYNECOLOGY

## 2022-07-07 RX ORDER — METOPROLOL SUCCINATE 25 MG/1
25 TABLET, EXTENDED RELEASE ORAL NIGHTLY
COMMUNITY
Start: 2022-06-22 | End: 2023-07-31

## 2022-07-07 RX ORDER — SULFAMETHOXAZOLE AND TRIMETHOPRIM 400; 80 MG/1; MG/1
1 TABLET ORAL 2 TIMES DAILY
Qty: 10 TABLET | Refills: 0 | Status: SHIPPED | OUTPATIENT
Start: 2022-07-07 | End: 2022-07-12

## 2022-07-07 RX ORDER — LISINOPRIL 10 MG/1
10 TABLET ORAL DAILY
COMMUNITY
Start: 2022-05-24 | End: 2023-07-31

## 2022-07-07 RX ORDER — FUROSEMIDE 20 MG/1
20 TABLET ORAL DAILY PRN
COMMUNITY
Start: 2022-03-28 | End: 2023-07-31

## 2022-07-07 NOTE — PROGRESS NOTES
Subjective:       Patient ID: Rubina Gonzalez is a 42 y.o. female.    Chief Complaint:  perineal cyst      History of Present Illness   41 y/o c/o 2 day hx of vaginal cyst that she noticed    Feels pressure but no pain  Was on Doxy for folliculitis    No recent trauma      GYN & OB History  No LMP recorded. Patient has had an ablation.   Date of Last Pap: 3/19/2020    OB History    Para Term  AB Living   4 4 3 1   4   SAB IAB Ectopic Multiple Live Births           4      # Outcome Date GA Lbr Jero/2nd Weight Sex Delivery Anes PTL Lv   4 Term  39w0d  3.6 kg (7 lb 15 oz) F Vag-Spont EPI  DIONNE   3 Term  38w0d  3.43 kg (7 lb 9 oz) F Vag-Spont EPI  DIONNE   2 Term  37w0d  3.572 kg (7 lb 14 oz) F Vag-Spont EPI  DIONNE   1   36w0d  3.402 kg (7 lb 8 oz) F Vag-Spont EPI  DIONNE      Obstetric Comments   Menarche 13        Objective:     There were no vitals filed for this visit.    Physical Exam:   Constitutional: She is oriented to person, place, and time. She appears well-developed and well-nourished.    HENT:   Head: Normocephalic.       Pulmonary/Chest: Effort normal.          Genitourinary:          Genitourinary Comments: 3cm circular firm mass in area of left Barthilin   Beatadine preop   3cc 1% lido with epi used to inject  I&D performed  Old dark maroon clot extruded  Silver nitrate applied to base  Gauze pad applied for pressure dressing  To remove in 2hrs  RX for bactrim sent              Musculoskeletal: Normal range of motion.       Neurological: She is alert and oriented to person, place, and time.    Skin: Skin is warm and dry.    Psychiatric: She has a normal mood and affect. Her behavior is normal.          Assessment/ Plan:     Orders Placed This Encounter    sulfamethoxazole-trimethoprim 400-80mg (BACTRIM) 400-80 mg per tablet       Rubina was seen today for perineal cyst.    Diagnoses and all orders for this visit:    Vulvar hematoma- s/p I&D    Pressure dressign applied    Pt  to check in with me tomorrow   Precautions given  -     sulfamethoxazole-trimethoprim 400-80mg (BACTRIM) 400-80 mg per tablet; Take 1 tablet by mouth 2 (two) times daily. for 5 days            Health Maintenance       Date Due Completion Date    Hepatitis C Screening Never done ---    COVID-19 Vaccine (1) Never done ---    HIV Screening Never done ---    TETANUS VACCINE Never done ---    Influenza Vaccine (1) 2022 ---    Mammogram 2022    Cervical Cancer Screening 2025          Answers for HPI/ROS submitted by the patient on 2022  genital itching: No  genital lesions: No  genital odor: No  genital rash: No  missed menses: No  vaginal bleeding: No  Chronicity: new  Onset: in the past 7 days  Frequency: constantly  Progression since onset: unchanged  Pain severity: no pain  Affected side: left  Pregnant now?: No  anorexia: No  discolored urine: No  painful intercourse: No  Vaginal bleeding: no bleeding  Passing clots?: No  Passing tissue?: No  Aggravated by: nothing  treatments tried: nothing, warm bath  Improvement on treatment: no relief  Sexual activity: sexually active  Partner with STD symptoms: no  Birth control: vasectomy  STD: No  abdominal surgery: No   section: No  Ectopic pregnancy: No  Endometriosis: No  herpes simplex: No  gynecological surgery: No  menorrhagia: No  metrorrhagia: No  miscarriage: No  ovarian cysts: Yes  perineal abscess: No  PID: No  terminated pregnancy: No  vaginosis: No

## 2022-08-01 ENCOUNTER — TELEPHONE (OUTPATIENT)
Dept: BARIATRICS | Facility: CLINIC | Age: 42
End: 2022-08-01
Payer: COMMERCIAL

## 2022-08-03 ENCOUNTER — TELEPHONE (OUTPATIENT)
Dept: BARIATRICS | Facility: CLINIC | Age: 42
End: 2022-08-03
Payer: COMMERCIAL

## 2022-10-07 ENCOUNTER — PATIENT MESSAGE (OUTPATIENT)
Dept: OBSTETRICS AND GYNECOLOGY | Facility: CLINIC | Age: 42
End: 2022-10-07
Payer: COMMERCIAL

## 2022-10-07 ENCOUNTER — TELEPHONE (OUTPATIENT)
Dept: OBSTETRICS AND GYNECOLOGY | Facility: CLINIC | Age: 42
End: 2022-10-07

## 2022-10-07 DIAGNOSIS — K59.00 CONSTIPATION, UNSPECIFIED CONSTIPATION TYPE: Primary | ICD-10-CM

## 2022-10-07 RX ORDER — LINACLOTIDE 72 UG/1
72 CAPSULE, GELATIN COATED ORAL
Qty: 90 CAPSULE | Refills: 1 | Status: SHIPPED | OUTPATIENT
Start: 2022-10-07 | End: 2023-04-04 | Stop reason: SDUPTHER

## 2022-12-21 ENCOUNTER — PATIENT MESSAGE (OUTPATIENT)
Dept: OBSTETRICS AND GYNECOLOGY | Facility: CLINIC | Age: 42
End: 2022-12-21
Payer: COMMERCIAL

## 2022-12-21 DIAGNOSIS — R39.89 SUSPECTED UTI: ICD-10-CM

## 2022-12-21 DIAGNOSIS — Z12.31 BREAST CANCER SCREENING BY MAMMOGRAM: Primary | ICD-10-CM

## 2022-12-21 DIAGNOSIS — Z12.39 BREAST CANCER SCREENING OTHER THAN MAMMOGRAM: ICD-10-CM

## 2022-12-22 ENCOUNTER — LAB VISIT (OUTPATIENT)
Dept: LAB | Facility: HOSPITAL | Age: 42
End: 2022-12-22
Attending: OBSTETRICS & GYNECOLOGY
Payer: COMMERCIAL

## 2022-12-22 DIAGNOSIS — R39.89 SUSPECTED UTI: ICD-10-CM

## 2022-12-22 PROCEDURE — 87086 URINE CULTURE/COLONY COUNT: CPT | Performed by: OBSTETRICS & GYNECOLOGY

## 2022-12-22 RX ORDER — NITROFURANTOIN 25; 75 MG/1; MG/1
100 CAPSULE ORAL 2 TIMES DAILY
Qty: 14 CAPSULE | Refills: 0 | Status: SHIPPED | OUTPATIENT
Start: 2022-12-22 | End: 2022-12-29

## 2022-12-24 LAB
BACTERIA UR CULT: NORMAL
BACTERIA UR CULT: NORMAL

## 2023-01-10 NOTE — PROGRESS NOTES
Just wanted to let you know that I got your mammogram results back and the radiologist reading is perfectly normal. Let me know if you have any questions or concerns  Hope you have a great day!  Dr Barbosa

## 2023-01-27 NOTE — PROGRESS NOTES
Addended by: Jose Alejandro Vera on: 1/27/2023 06:22 PM     Modules accepted: Orders Patient's AFFIRM swab was (+) for BV.    Please call her and let her know Rx sent for (Tinidazole x 2 days).  Remind patient she cannot drink while taking medication or for 3 days after completion of medication.    * Instruct patient to contact pharmacy to check on status of RX

## 2023-04-04 ENCOUNTER — PATIENT MESSAGE (OUTPATIENT)
Dept: OBSTETRICS AND GYNECOLOGY | Facility: CLINIC | Age: 43
End: 2023-04-04
Payer: COMMERCIAL

## 2023-04-04 DIAGNOSIS — K59.00 CONSTIPATION, UNSPECIFIED CONSTIPATION TYPE: ICD-10-CM

## 2023-04-04 RX ORDER — LINACLOTIDE 72 UG/1
72 CAPSULE, GELATIN COATED ORAL
Qty: 90 CAPSULE | Refills: 1 | Status: SHIPPED | OUTPATIENT
Start: 2023-04-04 | End: 2023-07-13 | Stop reason: SDUPTHER

## 2023-05-23 ENCOUNTER — PATIENT MESSAGE (OUTPATIENT)
Dept: OBSTETRICS AND GYNECOLOGY | Facility: CLINIC | Age: 43
End: 2023-05-23
Payer: COMMERCIAL

## 2023-05-23 DIAGNOSIS — R30.0 DYSURIA: Primary | ICD-10-CM

## 2023-05-23 RX ORDER — NITROFURANTOIN 25; 75 MG/1; MG/1
100 CAPSULE ORAL 2 TIMES DAILY
Qty: 14 CAPSULE | Refills: 0 | Status: SHIPPED | OUTPATIENT
Start: 2023-05-23 | End: 2023-05-30

## 2023-07-12 ENCOUNTER — PATIENT MESSAGE (OUTPATIENT)
Dept: OBSTETRICS AND GYNECOLOGY | Facility: CLINIC | Age: 43
End: 2023-07-12
Payer: COMMERCIAL

## 2023-07-12 DIAGNOSIS — K59.00 CONSTIPATION, UNSPECIFIED CONSTIPATION TYPE: ICD-10-CM

## 2023-07-13 RX ORDER — LINACLOTIDE 72 UG/1
72 CAPSULE, GELATIN COATED ORAL
Qty: 90 CAPSULE | Refills: 1 | Status: SHIPPED | OUTPATIENT
Start: 2023-07-13 | End: 2024-01-29 | Stop reason: SDUPTHER

## 2023-07-14 ENCOUNTER — TELEPHONE (OUTPATIENT)
Dept: PHARMACY | Facility: CLINIC | Age: 43
End: 2023-07-14
Payer: COMMERCIAL

## 2023-07-31 ENCOUNTER — OFFICE VISIT (OUTPATIENT)
Dept: OBSTETRICS AND GYNECOLOGY | Facility: CLINIC | Age: 43
End: 2023-07-31
Attending: OBSTETRICS & GYNECOLOGY
Payer: COMMERCIAL

## 2023-07-31 VITALS — HEIGHT: 63 IN | WEIGHT: 275.56 LBS | BODY MASS INDEX: 48.82 KG/M2

## 2023-07-31 DIAGNOSIS — Z01.419 ENCOUNTER FOR GYNECOLOGICAL EXAMINATION: Primary | ICD-10-CM

## 2023-07-31 DIAGNOSIS — Z11.51 SCREENING FOR HPV (HUMAN PAPILLOMAVIRUS): ICD-10-CM

## 2023-07-31 DIAGNOSIS — Z12.4 SCREENING FOR CERVICAL CANCER: ICD-10-CM

## 2023-07-31 DIAGNOSIS — E66.01 CLASS 3 SEVERE OBESITY WITH BODY MASS INDEX (BMI) OF 45.0 TO 49.9 IN ADULT, UNSPECIFIED OBESITY TYPE, UNSPECIFIED WHETHER SERIOUS COMORBIDITY PRESENT: ICD-10-CM

## 2023-07-31 PROCEDURE — 99396 PREV VISIT EST AGE 40-64: CPT | Mod: S$GLB,,, | Performed by: OBSTETRICS & GYNECOLOGY

## 2023-07-31 PROCEDURE — 99396 PR PREVENTIVE VISIT,EST,40-64: ICD-10-PCS | Mod: S$GLB,,, | Performed by: OBSTETRICS & GYNECOLOGY

## 2023-07-31 PROCEDURE — 99999 PR PBB SHADOW E&M-EST. PATIENT-LVL IV: ICD-10-PCS | Mod: PBBFAC,,, | Performed by: OBSTETRICS & GYNECOLOGY

## 2023-07-31 PROCEDURE — 99999 PR PBB SHADOW E&M-EST. PATIENT-LVL IV: CPT | Mod: PBBFAC,,, | Performed by: OBSTETRICS & GYNECOLOGY

## 2023-07-31 PROCEDURE — 87624 HPV HI-RISK TYP POOLED RSLT: CPT | Performed by: OBSTETRICS & GYNECOLOGY

## 2023-07-31 PROCEDURE — 88175 CYTOPATH C/V AUTO FLUID REDO: CPT | Performed by: PATHOLOGY

## 2023-07-31 PROCEDURE — 88141 CYTOPATH C/V INTERPRET: CPT | Mod: ,,, | Performed by: PATHOLOGY

## 2023-07-31 PROCEDURE — 88141 PR  CYTOPATH CERV/VAG INTERPRET: ICD-10-PCS | Mod: ,,, | Performed by: PATHOLOGY

## 2023-07-31 RX ORDER — POTASSIUM CITRATE 10 MEQ/1
10 TABLET, EXTENDED RELEASE ORAL 3 TIMES DAILY
COMMUNITY
Start: 2023-04-06

## 2023-07-31 RX ORDER — KETOROLAC TROMETHAMINE 10 MG/1
10 TABLET, FILM COATED ORAL EVERY 6 HOURS PRN
Status: ON HOLD | COMMUNITY
Start: 2023-03-30 | End: 2023-08-30 | Stop reason: HOSPADM

## 2023-07-31 RX ORDER — LOSARTAN POTASSIUM 25 MG/1
25 TABLET ORAL
COMMUNITY
Start: 2023-03-30 | End: 2023-11-01 | Stop reason: SDUPTHER

## 2023-07-31 RX ORDER — ONDANSETRON 4 MG/1
4 TABLET, FILM COATED ORAL EVERY 6 HOURS PRN
COMMUNITY
Start: 2023-03-13 | End: 2023-07-31

## 2023-07-31 NOTE — PROGRESS NOTES
"Chief Complaint: well woman exam  Annual Exam    She is established    Rubina Gonzalez is a 43 y.o. female  presents for a well woman exam.    C/o weight gain - had a stressful year and is interested in weight loss meds to help her   Had kidney stones 3/23 Saw Dr Mars- doing better  Occ vaginal itching- not today - will give Rx for Diflucan   Does have Folliculitis off and on   Sees Dr Quarles s/p removal of mass - breast cyst benign   Discussed with Willam pro and con of yearly MRI  Pt and her mom BRCA neg    ROS:  No abdominal pain. No vaginal discharge  No breast pain or masses, No rectal bleeding     Cycles: None post ablation   LMP: No LMP recorded. Patient has had an ablation..    Contraception: The current method of family planning is vasectomy     Last Pap: 2020 pap & hpv negative  Last MM2022 birads 1 [facility]      Past Medical History:   Diagnosis Date    Abnormal Pap smear of cervix 2015    ASCUS / Hpv Neg    COVID-19 virus infection 2020    Depression     Family history of breast cancer in mother     dx. age 48    Galactorrhea     History of kidney stones 2023    Hypertension     on Lisinopril    Insomnia     Irregular menstrual bleeding     Migraine     topomax    Panic attacks        Past Surgical History:   Procedure Laterality Date    BREAST BIOPSY Right 2017    Core Bx  (Path:  Fibrocystic Changes)    BREAST LUMPECTOMY W/ NEEDLE LOCALIZATION Right 2019    per  (Path Diag: "simple benign cyst" - f/u every 6 mos.    CHOLECYSTECTOMY  2006    ENDOMETRIAL ABLATION  2017    Dr Adrian Bone       OB History    Para Term  AB Living   4 4 3 1   4   SAB IAB Ectopic Multiple Live Births           4      # Outcome Date GA Lbr Jero/2nd Weight Sex Delivery Anes PTL Lv   4 Term  39w0d  3.6 kg (7 lb 15 oz) F Vag-Spont EPI  DIONNE   3 Term  38w0d  3.43 kg (7 lb 9 oz) F Vag-Spont EPI  DIONNE   2 Term  37w0d  3.572 kg (7 lb 14 oz) F Vag-Spont " "EPI  DIONNE   1   36w0d  3.402 kg (7 lb 8 oz) F Vag-Spont EPI  DIONNE      Obstetric Comments   Menarche 13       Family History   Problem Relation Age of Onset    Colon cancer Paternal Grandmother     Cancer Paternal Grandmother     Breast cancer Paternal Grandmother     Colon cancer Maternal Grandfather     Cancer Maternal Grandfather     Hypertension Father     Breast cancer Mother 57    Hypertension Mother     Cancer Mother     No Known Problems Brother     No Known Problems Brother     Depression Daughter     Depression Daughter     No Known Problems Daughter     No Known Problems Daughter     Breast cancer Paternal Aunt     Cancer Paternal Aunt     Ovarian cancer Neg Hx        Social History     Tobacco Use    Smoking status: Never    Smokeless tobacco: Never   Substance Use Topics    Alcohol use: Yes     Comment: Social    Drug use: No         Physical Exam:  Ht 5' 3" (1.6 m)   Wt 125 kg (275 lb 9.2 oz)   BMI 48.82 kg/m²     APPEARANCE: Well nourished, well developed, in no acute distress.  AFFECT: WNL, alert and oriented x 3  SKIN: No acne or hirsutism  BREASTS: Symmetrical, no skin changes.                      No nipple discharge.   No palpable masses bilaterally  NODES: No inguinal nor axillary LAD  ABDOMEN: soft Non tender Non distended No masses  PELVIC:   Normal external genitalia without lesions.  Normal hair distribution.   Adequate perineal body, normal urethral meatus.   No signif cystocele or rectocele.  Vagina moist and well rugated without lesions or discharge.    Cervix pink, without lesions, discharge or tenderness.     PAP performed   Bimanual exam shows uterus to be normal size, regular, mobile and nontender.    Adnexa without masses or tenderness.    EXTREMITIES: No edema.        ASSESSMENT AND PLAN  Rubina was seen today for annual exam.    Diagnoses and all orders for this visit:    Encounter for gynecological examination   PAP smear performed    MMG per Willam - considering " baseline MRI    Class 3 severe obesity with body mass index (BMI) of 45.0 to 49.9 in adult, unspecified obesity type, unspecified whether serious comorbidity present  -     Ambulatory referral/consult to Family Practice; Future  -     Ambulatory referral/consult to Bariatric Medicine; Future        Patient was counseled today on A.C.S. Pap guidelines and recommendations for yearly pelvic exams, mammograms and monthly self breast exams; to see her PCP for other health maintenance.     Patient encouraged to register for portal and results will be sent via portal.       Health Maintenance   Topic Date Due    Hepatitis C Screening  Never done    TETANUS VACCINE  Never done    Mammogram  12/22/2023    Lipid Panel  Completed

## 2023-07-31 NOTE — PROGRESS NOTES
LMP: No LMP recorded. Patient has had an ablation..    Contraception: The current method of family planning is vasectomy  Meds per MD:     Last Pap: 2020 pap & hpv negative  Last MM2022 birads 1 [facility]  Last Colonoscopy:

## 2023-08-07 ENCOUNTER — OFFICE VISIT (OUTPATIENT)
Dept: INTERNAL MEDICINE | Facility: CLINIC | Age: 43
End: 2023-08-07
Payer: COMMERCIAL

## 2023-08-07 VITALS
TEMPERATURE: 98 F | RESPIRATION RATE: 18 BRPM | SYSTOLIC BLOOD PRESSURE: 130 MMHG | OXYGEN SATURATION: 98 % | HEIGHT: 63 IN | BODY MASS INDEX: 49.1 KG/M2 | DIASTOLIC BLOOD PRESSURE: 74 MMHG | HEART RATE: 90 BPM | WEIGHT: 277.13 LBS

## 2023-08-07 DIAGNOSIS — G89.4 CHRONIC PAIN SYNDROME: ICD-10-CM

## 2023-08-07 DIAGNOSIS — Z00.00 PHYSICAL EXAM, ANNUAL: Primary | ICD-10-CM

## 2023-08-07 DIAGNOSIS — E66.01 CLASS 3 SEVERE OBESITY WITH BODY MASS INDEX (BMI) OF 45.0 TO 49.9 IN ADULT, UNSPECIFIED OBESITY TYPE, UNSPECIFIED WHETHER SERIOUS COMORBIDITY PRESENT: ICD-10-CM

## 2023-08-07 LAB
FINAL PATHOLOGIC DIAGNOSIS: ABNORMAL
Lab: ABNORMAL

## 2023-08-07 PROCEDURE — 99999 PR PBB SHADOW E&M-EST. PATIENT-LVL V: CPT | Mod: PBBFAC,,, | Performed by: STUDENT IN AN ORGANIZED HEALTH CARE EDUCATION/TRAINING PROGRAM

## 2023-08-07 PROCEDURE — 99386 PR PREVENTIVE VISIT,NEW,40-64: ICD-10-PCS | Mod: S$GLB,,, | Performed by: STUDENT IN AN ORGANIZED HEALTH CARE EDUCATION/TRAINING PROGRAM

## 2023-08-07 PROCEDURE — 99999 PR PBB SHADOW E&M-EST. PATIENT-LVL V: ICD-10-PCS | Mod: PBBFAC,,, | Performed by: STUDENT IN AN ORGANIZED HEALTH CARE EDUCATION/TRAINING PROGRAM

## 2023-08-07 PROCEDURE — 99386 PREV VISIT NEW AGE 40-64: CPT | Mod: S$GLB,,, | Performed by: STUDENT IN AN ORGANIZED HEALTH CARE EDUCATION/TRAINING PROGRAM

## 2023-08-07 RX ORDER — AMITRIPTYLINE HYDROCHLORIDE 25 MG/1
25 TABLET, FILM COATED ORAL NIGHTLY PRN
Qty: 90 TABLET | Refills: 1 | Status: SHIPPED | OUTPATIENT
Start: 2023-08-07 | End: 2024-08-06

## 2023-08-07 NOTE — PROGRESS NOTES
"Subjective:      Chief Complaint: Obesity    HPI  Ms.Kristine Gonzalez is a 43-year-old woman with pertinent medical history including anxiety/depression (off of Lexapro 10 for a few months and Xanax 1 b.i.d. p.r.n.) & hypertension (losartan 25) presenting as a new patient to establish primary care:    BMI>45:  -  refer to bariatric medicine in August of last year with upcoming financial phone call scheduled    Fibromyalgia? :   -reports longstanding diffuse pain with light self palpation  -also has a significant family history (multiple second-degree relatives) with rheumatoid arthritis    Family, social, surgical Hx reviewed     Health Maintenance:  Due for routine vaccinations and annual screening labs    Past Medical History:   Diagnosis Date    Abnormal Pap smear of cervix 11/12/2015    ASCUS / Hpv Neg    COVID-19 virus infection 07/2020    Depression     Family history of breast cancer in mother     dx. age 48    Galactorrhea     History of kidney stones 03/2023    Hypertension     on Lisinopril    Insomnia     Irregular menstrual bleeding     Migraine     topomax    Panic attacks      Past Surgical History:   Procedure Laterality Date    BREAST BIOPSY Right 03/24/2017    Core Bx  (Path:  Fibrocystic Changes)    BREAST LUMPECTOMY W/ NEEDLE LOCALIZATION Right 11/2019    per  (Path Diag: "simple benign cyst" - f/u every 6 mos.    CHOLECYSTECTOMY  2006    ENDOMETRIAL ABLATION  03/28/2017    Dr Nguyễn Barbosa     Family History   Problem Relation Age of Onset    Colon cancer Paternal Grandmother     Cancer Paternal Grandmother     Breast cancer Paternal Grandmother     Colon cancer Maternal Grandfather     Cancer Maternal Grandfather     Hypertension Father     Breast cancer Mother 57    Hypertension Mother     Cancer Mother     No Known Problems Brother     No Known Problems Brother     Depression Daughter     Depression Daughter     No Known Problems Daughter     No Known Problems Daughter     Breast cancer " Paternal Aunt     Cancer Paternal Aunt     Ovarian cancer Neg Hx      Social History     Socioeconomic History    Marital status:    Tobacco Use    Smoking status: Never    Smokeless tobacco: Never   Substance and Sexual Activity    Alcohol use: Yes     Comment: Social    Drug use: No    Sexual activity: Yes     Partners: Male     Birth control/protection: Partner-Vasectomy     Comment: :  Ha   Social History Narrative    Four girls, one accepted to [employer]     Review of patient's allergies indicates:   Allergen Reactions    Ciprofloxacin Hives     Other reaction(s): constipation & face swelling  Other reaction(s): constipation & face swelling    Cephalexin Rash     Erin ROMERO Gonzalez had no medications administered during this visit.      Review of Systems   Constitutional:  Negative for appetite change, chills and fever.   HENT: Negative.     Respiratory:  Negative for cough, chest tightness and shortness of breath.    Cardiovascular:  Negative for chest pain, palpitations and leg swelling.   Gastrointestinal:  Negative for abdominal distention, abdominal pain, blood in stool, constipation, diarrhea, nausea and vomiting.   Endocrine: Negative.    Genitourinary:  Negative for difficulty urinating, dysuria, frequency and hematuria.   Musculoskeletal: Negative.    Integumentary:  Negative.   Neurological: Negative.    Psychiatric/Behavioral: Negative.           Objective:      Vitals:    08/07/23 1328   BP: 130/74   Pulse: 90   Resp: 18   Temp: 97.9 °F (36.6 °C)      Physical Exam  Vitals reviewed.   Constitutional:       General: She is not in acute distress.     Appearance: Normal appearance.   HENT:      Head: Normocephalic and atraumatic.      Comments: Facial features are symmetric      Nose: Nose normal. No congestion or rhinorrhea.      Mouth/Throat:      Mouth: Mucous membranes are moist.      Pharynx: Oropharynx is clear. No oropharyngeal exudate or posterior oropharyngeal erythema.   Eyes:       General: No scleral icterus.     Extraocular Movements: Extraocular movements intact.      Conjunctiva/sclera: Conjunctivae normal.   Cardiovascular:      Rate and Rhythm: Normal rate and regular rhythm.      Pulses: Normal pulses.      Heart sounds: Normal heart sounds.   Pulmonary:      Effort: Pulmonary effort is normal. No respiratory distress.      Breath sounds: Normal breath sounds.   Musculoskeletal:         General: No deformity or signs of injury. Normal range of motion.      Cervical back: Normal range of motion.      Comments: Gait normal    Skin:     General: Skin is warm and dry.      Findings: No rash.   Neurological:      General: No focal deficit present.      Mental Status: She is alert and oriented to person, place, and time. Mental status is at baseline.   Psychiatric:         Mood and Affect: Mood normal.         Behavior: Behavior normal.         Thought Content: Thought content normal.       Current Outpatient Medications on File Prior to Visit   Medication Sig Dispense Refill    ALPRAZolam (XANAX) 1 MG tablet TAKE 1 TABLET(1 MG) BY MOUTH TWICE DAILY 30 tablet 1    ergocalciferol, vitamin D2, (VITAMIN D ORAL) Take by mouth.      LINZESS 72 mcg Cap capsule Take 1 capsule (72 mcg total) by mouth before breakfast. 90 capsule 1    losartan (COZAAR) 25 MG tablet Take 25 mg by mouth.      potassium citrate (UROCIT-K) 10 mEq (1,080 mg) TbSR Take 10 mEq by mouth 3 (three) times daily.      EScitalopram oxalate (LEXAPRO) 10 MG tablet Take 10 mg by mouth once daily.      ketorolac (TORADOL) 10 mg tablet Take 10 mg by mouth every 6 (six) hours as needed.       No current facility-administered medications on file prior to visit.         Assessment:       1. Physical exam, annual    2. Class 3 severe obesity with body mass index (BMI) of 45.0 to 49.9 in adult, unspecified obesity type, unspecified whether serious comorbidity present    3. BMI 45.0-49.9, adult    4. Chronic pain syndrome        Plan:        Physical exam, annual  -     CBC Auto Differential; Future; Expected date: 08/07/2023  -     Comprehensive Metabolic Panel; Future; Expected date: 08/07/2023  -     Hemoglobin A1C; Future; Expected date: 08/07/2023  -     Hepatitis C Antibody; Future; Expected date: 08/07/2023  -     HIV 1/2 Ag/Ab (4th Gen); Future; Expected date: 08/07/2023  -     Lipid Panel; Future; Expected date: 08/07/2023  -     T4; Future; Expected date: 08/07/2023  -     TSH; Future; Expected date: 08/07/2023  -     Vitamin D; Future; Expected date: 08/07/2023   - annual screening labs ordered     Class 3 severe obesity with body mass index (BMI) of 45.0 to 49.9 in adult, unspecified obesity type, unspecified whether serious comorbidity present  -     Ambulatory referral/consult to Family Practice   - primary care assumed    BMI 45.0-49.9, adult  -     Ambulatory referral/consult to Bariatric Medicine; Future; Expected date: 08/14/2023   - recommendations and interventions appreciated      Chronic pain syndrome  -     C-Reactive Protein; Future; Expected date: 08/07/2023  -     Cyclic Citrullinated Peptide Antibody, IgG; Future; Expected date: 08/07/2023  -     Rheumatoid Factor; Future; Expected date: 08/07/2023  -     Sedimentation rate; Future; Expected date: 08/07/2023   - rheumatologic panel added to annual screening labs    - high pretest probability for fibromyalgia for which trial of q.h.s. amitriptyline has been provided; can titrate to effect in 25 mg increments (not to exceed 100 mg q.h.s.).    Other orders  -     (In Office Administered) Tdap Vaccine  -     amitriptyline (ELAVIL) 25 MG tablet; Take 1 tablet (25 mg total) by mouth nightly as needed for Insomnia.  Dispense: 90 tablet; Refill: 1

## 2023-08-08 ENCOUNTER — LAB VISIT (OUTPATIENT)
Dept: LAB | Facility: HOSPITAL | Age: 43
End: 2023-08-08
Attending: STUDENT IN AN ORGANIZED HEALTH CARE EDUCATION/TRAINING PROGRAM
Payer: COMMERCIAL

## 2023-08-08 DIAGNOSIS — Z00.00 PHYSICAL EXAM, ANNUAL: ICD-10-CM

## 2023-08-08 DIAGNOSIS — G89.4 CHRONIC PAIN SYNDROME: ICD-10-CM

## 2023-08-08 LAB
25(OH)D3+25(OH)D2 SERPL-MCNC: 38 NG/ML (ref 30–96)
ALBUMIN SERPL BCP-MCNC: 3.6 G/DL (ref 3.5–5.2)
ALP SERPL-CCNC: 83 U/L (ref 55–135)
ALT SERPL W/O P-5'-P-CCNC: 19 U/L (ref 10–44)
ANION GAP SERPL CALC-SCNC: 11 MMOL/L (ref 8–16)
AST SERPL-CCNC: 15 U/L (ref 10–40)
BASOPHILS # BLD AUTO: 0.05 K/UL (ref 0–0.2)
BASOPHILS NFR BLD: 0.5 % (ref 0–1.9)
BILIRUB SERPL-MCNC: 0.4 MG/DL (ref 0.1–1)
BUN SERPL-MCNC: 11 MG/DL (ref 6–20)
CALCIUM SERPL-MCNC: 9.1 MG/DL (ref 8.7–10.5)
CCP AB SER IA-ACNC: <0.5 U/ML
CHLORIDE SERPL-SCNC: 106 MMOL/L (ref 95–110)
CHOLEST SERPL-MCNC: 222 MG/DL (ref 120–199)
CHOLEST/HDLC SERPL: 5.4 {RATIO} (ref 2–5)
CO2 SERPL-SCNC: 20 MMOL/L (ref 23–29)
CREAT SERPL-MCNC: 0.7 MG/DL (ref 0.5–1.4)
CRP SERPL-MCNC: 24.5 MG/L (ref 0–8.2)
DIFFERENTIAL METHOD: ABNORMAL
EOSINOPHIL # BLD AUTO: 0.4 K/UL (ref 0–0.5)
EOSINOPHIL NFR BLD: 3.5 % (ref 0–8)
ERYTHROCYTE [DISTWIDTH] IN BLOOD BY AUTOMATED COUNT: 13.5 % (ref 11.5–14.5)
ERYTHROCYTE [SEDIMENTATION RATE] IN BLOOD BY PHOTOMETRIC METHOD: 25 MM/HR (ref 0–36)
EST. GFR  (NO RACE VARIABLE): >60 ML/MIN/1.73 M^2
ESTIMATED AVG GLUCOSE: 108 MG/DL (ref 68–131)
GLUCOSE SERPL-MCNC: 98 MG/DL (ref 70–110)
HBA1C MFR BLD: 5.4 % (ref 4–5.6)
HCT VFR BLD AUTO: 40.6 % (ref 37–48.5)
HCV AB SERPL QL IA: NORMAL
HDLC SERPL-MCNC: 41 MG/DL (ref 40–75)
HDLC SERPL: 18.5 % (ref 20–50)
HGB BLD-MCNC: 13.3 G/DL (ref 12–16)
HIV 1+2 AB+HIV1 P24 AG SERPL QL IA: NORMAL
IMM GRANULOCYTES # BLD AUTO: 0.03 K/UL (ref 0–0.04)
IMM GRANULOCYTES NFR BLD AUTO: 0.3 % (ref 0–0.5)
LDLC SERPL CALC-MCNC: 145.6 MG/DL (ref 63–159)
LYMPHOCYTES # BLD AUTO: 1.4 K/UL (ref 1–4.8)
LYMPHOCYTES NFR BLD: 12.9 % (ref 18–48)
MCH RBC QN AUTO: 29.2 PG (ref 27–31)
MCHC RBC AUTO-ENTMCNC: 32.8 G/DL (ref 32–36)
MCV RBC AUTO: 89 FL (ref 82–98)
MONOCYTES # BLD AUTO: 0.6 K/UL (ref 0.3–1)
MONOCYTES NFR BLD: 5.9 % (ref 4–15)
NEUTROPHILS # BLD AUTO: 8.1 K/UL (ref 1.8–7.7)
NEUTROPHILS NFR BLD: 76.9 % (ref 38–73)
NONHDLC SERPL-MCNC: 181 MG/DL
NRBC BLD-RTO: 0 /100 WBC
PLATELET # BLD AUTO: 342 K/UL (ref 150–450)
PMV BLD AUTO: 11.1 FL (ref 9.2–12.9)
POTASSIUM SERPL-SCNC: 4 MMOL/L (ref 3.5–5.1)
PROT SERPL-MCNC: 7 G/DL (ref 6–8.4)
RBC # BLD AUTO: 4.56 M/UL (ref 4–5.4)
RHEUMATOID FACT SERPL-ACNC: <13 IU/ML (ref 0–15)
SODIUM SERPL-SCNC: 137 MMOL/L (ref 136–145)
T4 SERPL-MCNC: 6.9 UG/DL (ref 4.5–11.5)
TRIGL SERPL-MCNC: 177 MG/DL (ref 30–150)
TSH SERPL DL<=0.005 MIU/L-ACNC: 2.61 UIU/ML (ref 0.4–4)
WBC # BLD AUTO: 10.56 K/UL (ref 3.9–12.7)

## 2023-08-08 PROCEDURE — 86431 RHEUMATOID FACTOR QUANT: CPT | Performed by: STUDENT IN AN ORGANIZED HEALTH CARE EDUCATION/TRAINING PROGRAM

## 2023-08-08 PROCEDURE — 84443 ASSAY THYROID STIM HORMONE: CPT | Performed by: STUDENT IN AN ORGANIZED HEALTH CARE EDUCATION/TRAINING PROGRAM

## 2023-08-08 PROCEDURE — 85025 COMPLETE CBC W/AUTO DIFF WBC: CPT | Performed by: STUDENT IN AN ORGANIZED HEALTH CARE EDUCATION/TRAINING PROGRAM

## 2023-08-08 PROCEDURE — 86803 HEPATITIS C AB TEST: CPT | Performed by: STUDENT IN AN ORGANIZED HEALTH CARE EDUCATION/TRAINING PROGRAM

## 2023-08-08 PROCEDURE — 87389 HIV-1 AG W/HIV-1&-2 AB AG IA: CPT | Performed by: STUDENT IN AN ORGANIZED HEALTH CARE EDUCATION/TRAINING PROGRAM

## 2023-08-08 PROCEDURE — 84436 ASSAY OF TOTAL THYROXINE: CPT | Performed by: STUDENT IN AN ORGANIZED HEALTH CARE EDUCATION/TRAINING PROGRAM

## 2023-08-08 PROCEDURE — 86140 C-REACTIVE PROTEIN: CPT | Performed by: STUDENT IN AN ORGANIZED HEALTH CARE EDUCATION/TRAINING PROGRAM

## 2023-08-08 PROCEDURE — 86200 CCP ANTIBODY: CPT | Performed by: STUDENT IN AN ORGANIZED HEALTH CARE EDUCATION/TRAINING PROGRAM

## 2023-08-08 PROCEDURE — 82306 VITAMIN D 25 HYDROXY: CPT | Performed by: STUDENT IN AN ORGANIZED HEALTH CARE EDUCATION/TRAINING PROGRAM

## 2023-08-08 PROCEDURE — 80061 LIPID PANEL: CPT | Performed by: STUDENT IN AN ORGANIZED HEALTH CARE EDUCATION/TRAINING PROGRAM

## 2023-08-08 PROCEDURE — 85652 RBC SED RATE AUTOMATED: CPT | Performed by: STUDENT IN AN ORGANIZED HEALTH CARE EDUCATION/TRAINING PROGRAM

## 2023-08-08 PROCEDURE — 36415 COLL VENOUS BLD VENIPUNCTURE: CPT | Mod: PO | Performed by: STUDENT IN AN ORGANIZED HEALTH CARE EDUCATION/TRAINING PROGRAM

## 2023-08-08 PROCEDURE — 80053 COMPREHEN METABOLIC PANEL: CPT | Performed by: STUDENT IN AN ORGANIZED HEALTH CARE EDUCATION/TRAINING PROGRAM

## 2023-08-08 PROCEDURE — 83036 HEMOGLOBIN GLYCOSYLATED A1C: CPT | Performed by: STUDENT IN AN ORGANIZED HEALTH CARE EDUCATION/TRAINING PROGRAM

## 2023-08-09 ENCOUNTER — PATIENT MESSAGE (OUTPATIENT)
Dept: INTERNAL MEDICINE | Facility: CLINIC | Age: 43
End: 2023-08-09
Payer: COMMERCIAL

## 2023-08-09 ENCOUNTER — OFFICE VISIT (OUTPATIENT)
Dept: OBSTETRICS AND GYNECOLOGY | Facility: CLINIC | Age: 43
End: 2023-08-09
Payer: COMMERCIAL

## 2023-08-09 DIAGNOSIS — R87.610 ASCUS OF CERVIX WITH NEGATIVE HIGH RISK HPV: Primary | ICD-10-CM

## 2023-08-09 PROCEDURE — 99213 PR OFFICE/OUTPT VISIT, EST, LEVL III, 20-29 MIN: ICD-10-PCS | Mod: 95,,, | Performed by: OBSTETRICS & GYNECOLOGY

## 2023-08-09 PROCEDURE — 99213 OFFICE O/P EST LOW 20 MIN: CPT | Mod: 95,,, | Performed by: OBSTETRICS & GYNECOLOGY

## 2023-08-09 NOTE — PROGRESS NOTES
The patient location is: [facility]  The chief complaint leading to consultation is: ascus pap  Visit type: audiovisual  Total time spent with patient: 15 min    Each patient to whom he or she provides medical services by telemedicine is:  (1) informed of the relationship between the physician and patient and the respective role of any other health care provider with respect to management of the patient; and (2) notified that he or she may decline to receive medical services by telemedicine and may withdraw from such care at any time.    Notes:     Chief Complaint: Abnormal pap     HPI:      Rubina Gonzalez is a 43 y.o.  who presents complaining of ASCUS pap HPV neg   Worried and concerned   Time spent explaining HPV neg and what a ascus pap means   Plan to repeat pap and HPV in a year .      No LMP recorded. Patient has had an ablation.      ROS:     GENERAL: Denies fevers or chills. Feeling well overall.   ABDOMEN: Denies abdominal pain, constipation, diarrhea, nausea, vomiting, change in appetite.   URINARY: Denies frequency, dysuria, hematuria.  GYNECOLOGIC: See HPI.  NEUROLOGIC: Denies syncope or weakness.     Physical Exam:      PHYSICAL EXAM:  There were no vitals taken for this visit.  There is no height or weight on file to calculate BMI.     APPEARANCE: Well nourished, well developed, in no acute distress.  Exam deferred due to televisit    Results:      ASCUS pap  HPV neg     Assessment/Plan:     ASCUS of cervix with negative high risk HPV    Time spent explaining HPV neg and what a ascus pap means   Plan to repeat pap and HPV in a year .   All questions answered and pt understands and agrees with plan      Counseling:       Use of the [employer] Patient Portal discussed and encouraged during today's visit.

## 2023-08-14 ENCOUNTER — TELEPHONE (OUTPATIENT)
Dept: OBSTETRICS AND GYNECOLOGY | Facility: CLINIC | Age: 43
End: 2023-08-14
Payer: COMMERCIAL

## 2023-08-14 ENCOUNTER — LAB VISIT (OUTPATIENT)
Dept: LAB | Facility: HOSPITAL | Age: 43
End: 2023-08-14
Attending: OBSTETRICS & GYNECOLOGY
Payer: COMMERCIAL

## 2023-08-14 DIAGNOSIS — R39.89 SUSPECTED UTI: Primary | ICD-10-CM

## 2023-08-14 DIAGNOSIS — R39.89 SUSPECTED UTI: ICD-10-CM

## 2023-08-14 PROCEDURE — 87086 URINE CULTURE/COLONY COUNT: CPT | Performed by: OBSTETRICS & GYNECOLOGY

## 2023-08-14 RX ORDER — NITROFURANTOIN 25; 75 MG/1; MG/1
100 CAPSULE ORAL 2 TIMES DAILY
Qty: 14 CAPSULE | Refills: 0 | Status: SHIPPED | OUTPATIENT
Start: 2023-08-14 | End: 2023-08-21

## 2023-08-14 NOTE — TELEPHONE ENCOUNTER
Spoke with c/o bladder contractions x 1 wk now, urgency, retention.  Will come give urine specimen to [address] off this afternoon & we can send in Macrobid to [place].  Allergies utd

## 2023-08-15 LAB
BACTERIA UR CULT: NORMAL
BACTERIA UR CULT: NORMAL

## 2023-08-16 ENCOUNTER — PATIENT MESSAGE (OUTPATIENT)
Dept: OBSTETRICS AND GYNECOLOGY | Facility: CLINIC | Age: 43
End: 2023-08-16
Payer: COMMERCIAL

## 2023-08-16 DIAGNOSIS — R10.30 LOWER ABDOMINAL PAIN: Primary | ICD-10-CM

## 2023-08-16 DIAGNOSIS — Z87.442 HISTORY OF RENAL STONE: ICD-10-CM

## 2023-08-17 ENCOUNTER — OFFICE VISIT (OUTPATIENT)
Dept: INTERNAL MEDICINE | Facility: CLINIC | Age: 43
End: 2023-08-17
Payer: COMMERCIAL

## 2023-08-17 DIAGNOSIS — R79.82 CRP ELEVATED: Primary | ICD-10-CM

## 2023-08-17 PROCEDURE — 99214 OFFICE O/P EST MOD 30 MIN: CPT | Mod: 95,,, | Performed by: STUDENT IN AN ORGANIZED HEALTH CARE EDUCATION/TRAINING PROGRAM

## 2023-08-17 PROCEDURE — 99214 PR OFFICE/OUTPT VISIT, EST, LEVL IV, 30-39 MIN: ICD-10-PCS | Mod: 95,,, | Performed by: STUDENT IN AN ORGANIZED HEALTH CARE EDUCATION/TRAINING PROGRAM

## 2023-08-17 NOTE — PROGRESS NOTES
"Subjective:      Chief Complaint: Lab review     HPI  The patient location is:  patient's home  The chief complaint leading to consultation is:  Discussed labs  Visit type: Virtual visit with synchronous audio and video  Total time spent with patient:  15 mins   Each patient to whom he or she provides medical services by telemedicine is:  (1) informed of the relationship between the physician and patient and the respective role of any other health care provider with respect to management of the patient; and (2) notified that he or she may decline to receive medical services by telemedicine and may withdraw from such care at any time.  Patient agreed to this telemedicine visit today in an effort to avoid face-to-face visits due to the current COVID 19 pandemic.    Ms.Kristine Gonzalez is a 43-year-old woman with pertinent medical history including anxiety/depression (off of Lexapro 10 for a few months and Xanax 1 b.i.d. p.r.n.) & hypertension (losartan 25) presenting via telemedicine to discuss recent annual screening labs:    Annual screening lab interpretation sent the patient as below:  - 1 time/life time hepatitis/HIV panel is normal  - thyroid, liver, and kidney function are normal  - overall cholesterol control is adequate  - vitamin-D level is normal  - all cell lines are normal  - diabetic screening is normal  - rheumatologic panel is reassuring; moderately elevated CRP ( in setting of normal rheumatoid factor, erythrocyte sedimentation rate and CCP antibody) is not particularly concerning"    CRP: 24.5  Lipid panel:  Total 2 22, triglycerides 177, HDL-to-LDL ratio 18.5    Review of Systems   Constitutional:  Negative for appetite change, chills and fever.   HENT: Negative.     Respiratory:  Negative for cough, chest tightness and shortness of breath.    Cardiovascular:  Negative for chest pain, palpitations and leg swelling.   Gastrointestinal:  Negative for abdominal distention, abdominal pain, blood in stool, " constipation, diarrhea, nausea and vomiting.   Endocrine: Negative.    Genitourinary:  Negative for difficulty urinating, dysuria, frequency and hematuria.   Musculoskeletal:  Positive for arthralgias.   Integumentary:  Negative.   Neurological: Negative.    Psychiatric/Behavioral: Negative.           Objective:      There were no vitals filed for this visit.   Physical Exam  Vitals reviewed: Unable to obtain given constraints of telemedicine.       Current Outpatient Medications on File Prior to Visit   Medication Sig Dispense Refill    ALPRAZolam (XANAX) 1 MG tablet TAKE 1 TABLET(1 MG) BY MOUTH TWICE DAILY 30 tablet 1    amitriptyline (ELAVIL) 25 MG tablet Take 1 tablet (25 mg total) by mouth nightly as needed for Insomnia. 90 tablet 1    ergocalciferol, vitamin D2, (VITAMIN D ORAL) Take by mouth.      EScitalopram oxalate (LEXAPRO) 10 MG tablet Take 10 mg by mouth once daily.      ketorolac (TORADOL) 10 mg tablet Take 10 mg by mouth every 6 (six) hours as needed.      LINZESS 72 mcg Cap capsule Take 1 capsule (72 mcg total) by mouth before breakfast. 90 capsule 1    losartan (COZAAR) 25 MG tablet Take 25 mg by mouth.      nitrofurantoin, macrocrystal-monohydrate, (MACROBID) 100 MG capsule Take 1 capsule (100 mg total) by mouth 2 (two) times daily. for 7 days 14 capsule 0    potassium citrate (UROCIT-K) 10 mEq (1,080 mg) TbSR Take 10 mEq by mouth 3 (three) times daily.       No current facility-administered medications on file prior to visit.         Assessment:       1. CRP elevated        Plan:       CRP elevated   - reassurance provided that mild elevation in only 1 of 4 systemic inflammatory markers is unlikely to be the result of an underlying rheumatologic disorder.  This can be checked again at next annual physical with consideration for rheumatology involvement if that value increases and/or other systemic inflammatory markers become abnormal.

## 2023-08-19 ENCOUNTER — HOSPITAL ENCOUNTER (OUTPATIENT)
Dept: RADIOLOGY | Facility: OTHER | Age: 43
Discharge: HOME | End: 2023-08-19
Attending: OBSTETRICS & GYNECOLOGY
Payer: COMMERCIAL

## 2023-08-19 DIAGNOSIS — Z87.442 HISTORY OF RENAL STONE: ICD-10-CM

## 2023-08-19 DIAGNOSIS — R10.30 LOWER ABDOMINAL PAIN: ICD-10-CM

## 2023-08-19 PROCEDURE — 74176 CT RENAL STONE STUDY ABD PELVIS WO: ICD-10-PCS | Mod: 26,,, | Performed by: RADIOLOGY

## 2023-08-19 PROCEDURE — 74176 CT ABD & PELVIS W/O CONTRAST: CPT | Mod: TC

## 2023-08-19 PROCEDURE — 74176 CT ABD & PELVIS W/O CONTRAST: CPT | Mod: 26,,, | Performed by: RADIOLOGY

## 2023-08-21 ENCOUNTER — PATIENT MESSAGE (OUTPATIENT)
Dept: OBSTETRICS AND GYNECOLOGY | Facility: CLINIC | Age: 43
End: 2023-08-21
Payer: COMMERCIAL

## 2023-08-21 ENCOUNTER — TELEPHONE (OUTPATIENT)
Dept: OBSTETRICS AND GYNECOLOGY | Facility: CLINIC | Age: 43
End: 2023-08-21

## 2023-08-21 DIAGNOSIS — N83.201 RIGHT OVARIAN CYST: Primary | ICD-10-CM

## 2023-08-21 NOTE — TELEPHONE ENCOUNTER
Sonya Mistry MA [facility] Staff 1 hour ago (1:15 PM)     SONIDO Barbosa patient called she would like to discuss her CT Scan results if they are in.      Rubina Gonzalez [phone]  Sonya Mistry MA 1 hour ago (1:14 PM)

## 2023-08-21 NOTE — TELEPHONE ENCOUNTER
Spoke to pt   Small left stone 1-2mm   Still having bladder sx   She will call tomorrow for appt with urologist     TV u/s ordered to make sure ov cyst resolves  Will call tomorrow to schedule u/s order after labor day  Pt states this does not feel like a cyst - feels like something on bladder for the past weeks

## 2023-08-21 NOTE — PROGRESS NOTES
Yaa- please call pt and schedule u/s only after labor day   Order is in     Spoke to pt   Small left stone 1-2mm   Still having bladder sx   She will call tomorrow for appt with urologist    TV u/s ordered to make sure ov cyst resolves  Will call tomorrow to schedule u/s order after labor day  Pt states this does not feel like a cyst - feels like something on bladder for the past 2 weeks

## 2023-08-23 ENCOUNTER — OFFICE VISIT (OUTPATIENT)
Dept: UROLOGY | Facility: CLINIC | Age: 43
End: 2023-08-23
Payer: COMMERCIAL

## 2023-08-23 VITALS
DIASTOLIC BLOOD PRESSURE: 109 MMHG | HEIGHT: 63 IN | HEART RATE: 78 BPM | BODY MASS INDEX: 47.85 KG/M2 | SYSTOLIC BLOOD PRESSURE: 158 MMHG | WEIGHT: 270.06 LBS

## 2023-08-23 DIAGNOSIS — R39.89 BLADDER PAIN: Primary | ICD-10-CM

## 2023-08-23 PROCEDURE — 99204 OFFICE O/P NEW MOD 45 MIN: CPT | Mod: S$GLB,,, | Performed by: UROLOGY

## 2023-08-23 PROCEDURE — 99204 PR OFFICE/OUTPT VISIT, NEW, LEVL IV, 45-59 MIN: ICD-10-PCS | Mod: S$GLB,,, | Performed by: UROLOGY

## 2023-08-23 PROCEDURE — 99999 PR PBB SHADOW E&M-EST. PATIENT-LVL III: CPT | Mod: PBBFAC,,, | Performed by: UROLOGY

## 2023-08-23 PROCEDURE — 99999 PR PBB SHADOW E&M-EST. PATIENT-LVL III: ICD-10-PCS | Mod: PBBFAC,,, | Performed by: UROLOGY

## 2023-08-23 PROCEDURE — 87086 URINE CULTURE/COLONY COUNT: CPT | Performed by: UROLOGY

## 2023-08-23 RX ORDER — URINARY ANTISEPTIC ANTISPASMODIC 81.6; 40.8; 10.8; .12 MG/1; MG/1; MG/1; MG/1
1 TABLET ORAL 3 TIMES DAILY PRN
Qty: 60 TABLET | Refills: 3 | Status: SHIPPED | OUTPATIENT
Start: 2023-08-23 | End: 2023-09-22

## 2023-08-23 NOTE — PROGRESS NOTES
[facility]      Bladder Pain New Note    8/23/2023    Referred by:  No ref. provider found    HPI: Rubina Gonzalez is a very pleasant 43 y.o. female who is a new patient to our department referred for evaluation of suspected bladder pain of 3 weeks duration.She reports that the pain is related to her voiding cycle. She reports pain is associated with urinary frequency and nocturia. Pain is greater with a full bladder. Based on the history provided today, this urinary frequency is driven by pain, pressure or discomfort and not fear of incontinence. She has an existing history of Stress Incontinence that has not changed.     She reports symptoms are triggered by consumption of no particular foods or beverages. She does not report periodic flares. She  reports no pain with intercourse.    She denies symptoms of obstructive voiding including decreased stream, hesitancy, intermittency, post void dribbling, and sense of incomplete emptying. Catheterized PVR was 15 mL.  Her history includes no notation of , hematuria, prior pelvic surgery, previous prolapse or incontinence procedures or neurological symptoms/diagnoses. She has a history of urolithiasis last treated in March, but recent CT shows now obstructive stones.  She denies all other prior pelvic surgeries or neurologic diagnoses. She does not report symptoms suggestive of advanced POP.   Her medical history is significant for {no relevant medical issues.     Previous therapies for these symptoms have included:   no previous therapies     Previous evaluation has included:   Urine cultures: showed multiple organisms; catheterized culture sent today  CT stone protocol: no obstructing stones; 1-2 mm non-obstructing stone    A review of 10+ systems was conducted with pertinent positive and negative findings documented in HPI with all other systems reviewed and negative.    Past medical, family, surgical and social history reviewed as documented in chart  with pertinent positive medical, family, surgical and social history detailed in HPI.    Exam Findings:    Vaginal Mucosa: normal  Anterior: none  Apical: no apical descent  Posterior: none  Urethral Lesions: no lesions or masses Const: no acute distress, conversant and alert  Eyes: anicteric, extraocular muscles intact  ENMT: normocephalic, Nl oral membranes  Cardio: no cyanosis, nl cap refill  Pulm: no tachypnea; no resp distress  Abd: soft, no tenderness  Musc: no laceration, no tenderness  Neuro: alert; oriented x 3  Skin: warm, dry; no petichiae  Psych: no anxiety; normal speech     Assessment/Plan:    Bladder Pain (new, addt'l workup): She reports reports pelvic pain, pressure and discomfort that appears associated with the voiding cycle.  Urinary frequency is largely driven by pain and discomfort rather than by a desire to avoid incontinence.  The history suggests the possibility of Bladder Pain Syndrome/InterstitialCystitis (BPS/IC) as a possible diagnosis, though the timing of her symptoms are still too early to definitively conclude this. We will send a catheterized specimen of culture. We will plan on office cystoscopy. If the evaluation is negative and symptoms persist, would begin treatment for BPS/IC. Will also look for any Hunner lesions at the time of cystoscopy.

## 2023-08-24 ENCOUNTER — PATIENT MESSAGE (OUTPATIENT)
Dept: UROLOGY | Facility: CLINIC | Age: 43
End: 2023-08-24
Payer: COMMERCIAL

## 2023-08-24 LAB — BACTERIA UR CULT: NO GROWTH

## 2023-08-25 ENCOUNTER — PATIENT MESSAGE (OUTPATIENT)
Dept: UROLOGY | Facility: CLINIC | Age: 43
End: 2023-08-25
Payer: COMMERCIAL

## 2023-08-28 ENCOUNTER — TELEPHONE (OUTPATIENT)
Dept: UROLOGY | Facility: CLINIC | Age: 43
End: 2023-08-28
Payer: COMMERCIAL

## 2023-08-28 NOTE — TELEPHONE ENCOUNTER
"Scheduled pt for a f/u visit tomorrow at 2:20 pm to review her CT scan results    ----- Message from Luis Hussein MD sent at 8/28/2023  3:52 PM CDT -----  Regarding: RE: Call back  I can see her tomorrow in the office if she would like to review.   CG  ----- Message -----  From: Cortney Zuleta MA  Sent: 8/28/2023   2:27 PM CDT  To: Luis Hussein MD  Subject: FW: Call back                                    Pt wound up in the ER at  with severe pain. ED did a CT scan with contrast which showed a kidney stone moving into her bladder. Pt is requesting that you look over her results to advise next steps.     ----- Message -----  From: Vivienne Gardner  Sent: 8/28/2023  10:36 AM CDT  To: Jovita Smith Staff  Subject: Call back                                        "Type:  Patient Call Back    Who Called:PT    What is the reqeust in detail:Requesting call back would like for doctor to review new information she received when she went to ER on 8/27. Please advise    Can the clinic reply by [facility]?no     Best Call Back Number:[phone]      Additional Information:Pt went to ER at [facility] and would like to know if doctor can review.                "

## 2023-08-29 ENCOUNTER — OFFICE VISIT (OUTPATIENT)
Dept: UROLOGY | Facility: CLINIC | Age: 43
End: 2023-08-29
Payer: COMMERCIAL

## 2023-08-29 ENCOUNTER — TELEPHONE (OUTPATIENT)
Dept: UROLOGY | Facility: CLINIC | Age: 43
End: 2023-08-29
Payer: COMMERCIAL

## 2023-08-29 ENCOUNTER — ANESTHESIA EVENT (OUTPATIENT)
Dept: SURGERY | Facility: HOSPITAL | Age: 43
End: 2023-08-29
Payer: COMMERCIAL

## 2023-08-29 VITALS
SYSTOLIC BLOOD PRESSURE: 140 MMHG | DIASTOLIC BLOOD PRESSURE: 94 MMHG | WEIGHT: 271.38 LBS | HEIGHT: 63 IN | HEART RATE: 98 BPM | BODY MASS INDEX: 48.09 KG/M2

## 2023-08-29 DIAGNOSIS — N20.1 CALCULUS OF URETER: Primary | ICD-10-CM

## 2023-08-29 DIAGNOSIS — N20.1 LEFT URETERAL STONE: Primary | ICD-10-CM

## 2023-08-29 PROCEDURE — 99999 PR PBB SHADOW E&M-EST. PATIENT-LVL III: CPT | Mod: PBBFAC,,, | Performed by: UROLOGY

## 2023-08-29 PROCEDURE — 99999 PR PBB SHADOW E&M-EST. PATIENT-LVL III: ICD-10-PCS | Mod: PBBFAC,,, | Performed by: UROLOGY

## 2023-08-29 PROCEDURE — 99214 PR OFFICE/OUTPT VISIT, EST, LEVL IV, 30-39 MIN: ICD-10-PCS | Mod: S$GLB,,, | Performed by: UROLOGY

## 2023-08-29 PROCEDURE — 99214 OFFICE O/P EST MOD 30 MIN: CPT | Mod: S$GLB,,, | Performed by: UROLOGY

## 2023-08-29 RX ORDER — ONDANSETRON 4 MG/1
TABLET, ORALLY DISINTEGRATING ORAL
COMMUNITY
Start: 2023-08-27

## 2023-08-29 RX ORDER — TAMSULOSIN HYDROCHLORIDE 0.4 MG/1
CAPSULE ORAL
COMMUNITY
Start: 2023-08-06

## 2023-08-29 RX ORDER — HYDROCODONE BITARTRATE AND ACETAMINOPHEN 5; 325 MG/1; MG/1
TABLET ORAL
COMMUNITY
Start: 2023-08-27

## 2023-08-29 NOTE — H&P (VIEW-ONLY)
[facility]    Urolithiasis New Note    8/29/2023    Referred by:  No ref. provider found    HPI: Rubina Gonzalez is a very pleasant 43 y.o. female who is an established patient in our department here for evaluation of suspected urolithiasis. The diagnosis is supported by findings on recent imaging suggesting active urolithiasis. She reports no dysuria, fever, chills, nausea, emesis or hematuria. .  She had a CT scan reviewed last week showing no hydronephrosis bilaterally with a 2 mm non-obstructing stone in the left kidney. She was scheduled for cystoscopy to evaluate bladder pain.     Over the weekend, she developed severe left flank pain. In the ED, she had a repeat CT scan. We do not have the images, only the report but reported to show left hydronephrosis and 5-6 mm ureteral stone. I suspect they are misreading one of the several     Upper tract imaging reviewed today:  As above    A review of 10+ systems was conducted with pertinent positive and negative findings documented in HPI with all other systems reviewed and negative.    Past medical, family, surgical and social history reviewed as documented in chart with pertinent positive medical, family, surgical and social history detailed in HPI.    Exam Findings:    Const: no acute distress, conversant and alert  Eyes: anicteric, extraocular muscles intact  ENMT: normocephalic, Nl oral membranes  Cardio: no cyanosis, nl cap refill  Pulm: no tachypnea; no resp distress  Abd: soft, no tenderness  Musc: no laceration, no tenderness  Neuro: alert; oriented x 3  Skin: warm, dry; no petichiae  Psych: no anxiety; normal speech       Assessment/Plan:    Urolithiasis with obstruction of distal ureter (new, addt'l workup):   I suspect that a smaller stone from the kidney has moved into the ureter given the lack of hydronephrosis seen on recent CT scan and report of new hydronephrosis on recent CT (we have report only). The 6 mm stone reported on CT is  likely the phlebolith seen on her prior CT. However, she is in a lot of discomfort and would like to proceed with USE of even a smaller stone in the left ureter. She will be scheduled for tomorrow.

## 2023-08-29 NOTE — PRE-PROCEDURE INSTRUCTIONS
Unable to reach pt via phone. Left message with instructions along with a request for a call back. RN emphasized for pt to call back if they have been taking abx in the past 2 weeks. The following was sent to pt portal.    Dear Eirn ,    You are scheduled for a procedure with Dr. Hussein on 8/30/2023. Your scheduled arrival time is 11:00am.  This arrival time is roughly 2 hours before your anticipated procedure time to allow sufficient time for pre-op.  Please wear comfortable clothes.  Most patients do not need to change into a gown.  Please do not wear a dress.  This procedure will take place at the [facility] at the corner of [facility] and [facility].  [facility] [place] [facility] to [facility].  The address is:    [address] [place] [address].  JONAH Baig [id number]    After entering the building, you will proceed to the second floor where you can check in with registration. You should take any medications that you routinely take for blood pressure (other than those listed below), heart medications, thyroid, cholesterol, etc.     If you wear contact lenses, please wear glasses to your procedure.    Your fasting instructions are as follow:  Nothing to eat or drink after midnight tonight. You may have small amounts of water to take medications in the morning. You MUST have a responsible adult to bring you home.      This evening (8/29/2023) and tomorrow morning, please hold the following medications:  -Aspirin and Aspirin-containing products (Goody's powder, Excedrin)  -NSAIDs (Advil, Ibuprofen, Aleve, Diclofenac)  -Vitamins/Supplements  -Herbal remedies/Teas  -Stimulants (Adderall, Vyvanse, Adipex)  -Diabetic medication (Please bring with you day of procedure)  -VITAMIN D2  -TORADOL  -LOSARTAN  -POTASSIUM CITRATE    -May take Tylenol      This evening (8/29/2023) and tomorrow morning, take a shower using antibacterial soap (ex: Hibiclens or Dial antibacterial soap). DO NOT  apply deodorant, lotion, cologne, or anything else to the skin.    If you have any procedure-specific questions, please call your surgeon's office. Any other questions, don't hesitate to call at [phone].    Thanks,  RODDY Vasquez  Pre-Admit Dept OC

## 2023-08-30 ENCOUNTER — ANESTHESIA (OUTPATIENT)
Dept: SURGERY | Facility: HOSPITAL | Age: 43
End: 2023-08-30
Payer: COMMERCIAL

## 2023-08-30 ENCOUNTER — HOSPITAL ENCOUNTER (OUTPATIENT)
Facility: HOSPITAL | Age: 43
Discharge: HOME | End: 2023-08-30
Attending: UROLOGY | Admitting: UROLOGY
Payer: COMMERCIAL

## 2023-08-30 VITALS
WEIGHT: 267.63 LBS | TEMPERATURE: 98 F | BODY MASS INDEX: 47.41 KG/M2 | OXYGEN SATURATION: 99 % | SYSTOLIC BLOOD PRESSURE: 152 MMHG | DIASTOLIC BLOOD PRESSURE: 70 MMHG | HEART RATE: 79 BPM | RESPIRATION RATE: 17 BRPM

## 2023-08-30 DIAGNOSIS — N20.9 UROLITHIASIS: ICD-10-CM

## 2023-08-30 DIAGNOSIS — N20.1 LEFT URETERAL STONE: Primary | ICD-10-CM

## 2023-08-30 DIAGNOSIS — N20.1 URETERAL STONE: ICD-10-CM

## 2023-08-30 PROCEDURE — 37000008 HC ANESTHESIA 1ST 15 MINUTES: Performed by: UROLOGY

## 2023-08-30 PROCEDURE — D9220A PRA ANESTHESIA: ICD-10-PCS | Mod: ANES,,, | Performed by: STUDENT IN AN ORGANIZED HEALTH CARE EDUCATION/TRAINING PROGRAM

## 2023-08-30 PROCEDURE — 74420 UROGRAPHY RTRGR +-KUB: CPT | Mod: 26,,, | Performed by: UROLOGY

## 2023-08-30 PROCEDURE — 25500020 PHARM REV CODE 255: Performed by: UROLOGY

## 2023-08-30 PROCEDURE — D9220A PRA ANESTHESIA: Mod: ANES,,, | Performed by: STUDENT IN AN ORGANIZED HEALTH CARE EDUCATION/TRAINING PROGRAM

## 2023-08-30 PROCEDURE — 52356 CYSTO/URETERO W/LITHOTRIPSY: CPT | Mod: LT,,, | Performed by: UROLOGY

## 2023-08-30 PROCEDURE — 99900035 HC TECH TIME PER 15 MIN (STAT)

## 2023-08-30 PROCEDURE — C2617 STENT, NON-COR, TEM W/O DEL: HCPCS | Performed by: UROLOGY

## 2023-08-30 PROCEDURE — 27201423 OPTIME MED/SURG SUP & DEVICES STERILE SUPPLY: Performed by: UROLOGY

## 2023-08-30 PROCEDURE — 25000003 PHARM REV CODE 250: Performed by: STUDENT IN AN ORGANIZED HEALTH CARE EDUCATION/TRAINING PROGRAM

## 2023-08-30 PROCEDURE — C1769 GUIDE WIRE: HCPCS | Performed by: UROLOGY

## 2023-08-30 PROCEDURE — D9220A PRA ANESTHESIA: ICD-10-PCS | Mod: CRNA,,, | Performed by: NURSE ANESTHETIST, CERTIFIED REGISTERED

## 2023-08-30 PROCEDURE — 63600175 PHARM REV CODE 636 W HCPCS: Performed by: NURSE ANESTHETIST, CERTIFIED REGISTERED

## 2023-08-30 PROCEDURE — 36000707: Performed by: UROLOGY

## 2023-08-30 PROCEDURE — 63600175 PHARM REV CODE 636 W HCPCS: Performed by: STUDENT IN AN ORGANIZED HEALTH CARE EDUCATION/TRAINING PROGRAM

## 2023-08-30 PROCEDURE — 94761 N-INVAS EAR/PLS OXIMETRY MLT: CPT

## 2023-08-30 PROCEDURE — 25000003 PHARM REV CODE 250: Performed by: NURSE ANESTHETIST, CERTIFIED REGISTERED

## 2023-08-30 PROCEDURE — 82365 CALCULUS SPECTROSCOPY: CPT | Performed by: UROLOGY

## 2023-08-30 PROCEDURE — C1758 CATHETER, URETERAL: HCPCS | Performed by: UROLOGY

## 2023-08-30 PROCEDURE — 25000003 PHARM REV CODE 250: Performed by: UROLOGY

## 2023-08-30 PROCEDURE — 63600175 PHARM REV CODE 636 W HCPCS: Performed by: UROLOGY

## 2023-08-30 PROCEDURE — 37000009 HC ANESTHESIA EA ADD 15 MINS: Performed by: UROLOGY

## 2023-08-30 PROCEDURE — D9220A PRA ANESTHESIA: Mod: CRNA,,, | Performed by: NURSE ANESTHETIST, CERTIFIED REGISTERED

## 2023-08-30 PROCEDURE — 74420 PR  X-RAY RETROGRADE PYELOGRAM: ICD-10-PCS | Mod: 26,,, | Performed by: UROLOGY

## 2023-08-30 PROCEDURE — 71000039 HC RECOVERY, EACH ADD'L HOUR: Performed by: UROLOGY

## 2023-08-30 PROCEDURE — 71000015 HC POSTOP RECOV 1ST HR: Performed by: UROLOGY

## 2023-08-30 PROCEDURE — 36000706: Performed by: UROLOGY

## 2023-08-30 PROCEDURE — 52356 PR CYSTO/URETERO W/LITHOTRIPSY: ICD-10-PCS | Mod: LT,,, | Performed by: UROLOGY

## 2023-08-30 PROCEDURE — 71000033 HC RECOVERY, INTIAL HOUR: Performed by: UROLOGY

## 2023-08-30 DEVICE — STENT URETERAL UNIV 6FR 24CM: Type: IMPLANTABLE DEVICE | Site: URETER | Status: FUNCTIONAL

## 2023-08-30 RX ORDER — DEXAMETHASONE SODIUM PHOSPHATE 4 MG/ML
INJECTION, SOLUTION INTRA-ARTICULAR; INTRALESIONAL; INTRAMUSCULAR; INTRAVENOUS; SOFT TISSUE
Status: DISCONTINUED | OUTPATIENT
Start: 2023-08-30 | End: 2023-08-30

## 2023-08-30 RX ORDER — SCOLOPAMINE TRANSDERMAL SYSTEM 1 MG/1
1 PATCH, EXTENDED RELEASE TRANSDERMAL ONCE
Status: DISCONTINUED | OUTPATIENT
Start: 2023-08-30 | End: 2023-08-30 | Stop reason: HOSPADM

## 2023-08-30 RX ORDER — SODIUM CHLORIDE 9 MG/ML
INJECTION, SOLUTION INTRAVENOUS CONTINUOUS
Status: DISCONTINUED | OUTPATIENT
Start: 2023-08-30 | End: 2023-08-30 | Stop reason: HOSPADM

## 2023-08-30 RX ORDER — OXYCODONE HYDROCHLORIDE 5 MG/1
5 TABLET ORAL
Status: DISCONTINUED | OUTPATIENT
Start: 2023-08-30 | End: 2023-08-30 | Stop reason: HOSPADM

## 2023-08-30 RX ORDER — HYDROMORPHONE HYDROCHLORIDE 1 MG/ML
0.5 INJECTION, SOLUTION INTRAMUSCULAR; INTRAVENOUS; SUBCUTANEOUS EVERY 5 MIN PRN
Status: DISCONTINUED | OUTPATIENT
Start: 2023-08-30 | End: 2023-08-30 | Stop reason: HOSPADM

## 2023-08-30 RX ORDER — FAMOTIDINE 10 MG/ML
INJECTION INTRAVENOUS
Status: DISCONTINUED | OUTPATIENT
Start: 2023-08-30 | End: 2023-08-30

## 2023-08-30 RX ORDER — ROCURONIUM BROMIDE 10 MG/ML
INJECTION, SOLUTION INTRAVENOUS
Status: DISCONTINUED | OUTPATIENT
Start: 2023-08-30 | End: 2023-08-30

## 2023-08-30 RX ORDER — ONDANSETRON 2 MG/ML
INJECTION INTRAMUSCULAR; INTRAVENOUS
Status: DISCONTINUED | OUTPATIENT
Start: 2023-08-30 | End: 2023-08-30

## 2023-08-30 RX ORDER — LIDOCAINE HYDROCHLORIDE 10 MG/ML
1 INJECTION, SOLUTION EPIDURAL; INFILTRATION; INTRACAUDAL; PERINEURAL ONCE AS NEEDED
Status: DISCONTINUED | OUTPATIENT
Start: 2023-08-30 | End: 2023-08-30 | Stop reason: HOSPADM

## 2023-08-30 RX ORDER — FENTANYL CITRATE 50 UG/ML
INJECTION, SOLUTION INTRAMUSCULAR; INTRAVENOUS
Status: DISCONTINUED | OUTPATIENT
Start: 2023-08-30 | End: 2023-08-30

## 2023-08-30 RX ORDER — KETOROLAC TROMETHAMINE 10 MG/1
10 TABLET, FILM COATED ORAL EVERY 6 HOURS
Qty: 20 TABLET | Refills: 0 | Status: SHIPPED | OUTPATIENT
Start: 2023-08-30 | End: 2023-09-04

## 2023-08-30 RX ORDER — HYDROCODONE BITARTRATE AND ACETAMINOPHEN 5; 325 MG/1; MG/1
1 TABLET ORAL EVERY 6 HOURS PRN
Qty: 16 TABLET | Refills: 0 | Status: SHIPPED | OUTPATIENT
Start: 2023-08-30 | End: 2023-09-03

## 2023-08-30 RX ORDER — MIDAZOLAM HYDROCHLORIDE 1 MG/ML
INJECTION, SOLUTION INTRAMUSCULAR; INTRAVENOUS
Status: DISCONTINUED | OUTPATIENT
Start: 2023-08-30 | End: 2023-08-30

## 2023-08-30 RX ORDER — ONDANSETRON 2 MG/ML
4 INJECTION INTRAMUSCULAR; INTRAVENOUS DAILY PRN
Status: DISCONTINUED | OUTPATIENT
Start: 2023-08-30 | End: 2023-08-30 | Stop reason: HOSPADM

## 2023-08-30 RX ORDER — PROPOFOL 10 MG/ML
VIAL (ML) INTRAVENOUS
Status: DISCONTINUED | OUTPATIENT
Start: 2023-08-30 | End: 2023-08-30

## 2023-08-30 RX ORDER — LIDOCAINE HYDROCHLORIDE 20 MG/ML
INJECTION, SOLUTION EPIDURAL; INFILTRATION; INTRACAUDAL; PERINEURAL
Status: DISCONTINUED | OUTPATIENT
Start: 2023-08-30 | End: 2023-08-30

## 2023-08-30 RX ADMIN — MIDAZOLAM HYDROCHLORIDE 2 MG: 1 INJECTION, SOLUTION INTRAMUSCULAR; INTRAVENOUS at 01:08

## 2023-08-30 RX ADMIN — FAMOTIDINE 20 MG: 10 INJECTION, SOLUTION INTRAVENOUS at 01:08

## 2023-08-30 RX ADMIN — ONDANSETRON 4 MG: 2 INJECTION INTRAMUSCULAR; INTRAVENOUS at 01:08

## 2023-08-30 RX ADMIN — DEXAMETHASONE SODIUM PHOSPHATE 8 MG: 4 INJECTION INTRA-ARTICULAR; INTRALESIONAL; INTRAMUSCULAR; INTRAVENOUS; SOFT TISSUE at 01:08

## 2023-08-30 RX ADMIN — SCOPALAMINE 1 PATCH: 1 PATCH, EXTENDED RELEASE TRANSDERMAL at 12:08

## 2023-08-30 RX ADMIN — PROPOFOL 200 MG: 10 INJECTION, EMULSION INTRAVENOUS at 01:08

## 2023-08-30 RX ADMIN — LIDOCAINE HYDROCHLORIDE 100 MG: 20 INJECTION, SOLUTION EPIDURAL; INFILTRATION; INTRACAUDAL; PERINEURAL at 01:08

## 2023-08-30 RX ADMIN — ROCURONIUM BROMIDE 50 MG: 10 INJECTION INTRAVENOUS at 01:08

## 2023-08-30 RX ADMIN — ONDANSETRON 4 MG: 2 INJECTION INTRAMUSCULAR; INTRAVENOUS at 02:08

## 2023-08-30 RX ADMIN — GLYCOPYRROLATE 0.2 MG: 0.2 INJECTION, SOLUTION INTRAMUSCULAR; INTRAVENOUS at 01:08

## 2023-08-30 RX ADMIN — HYDROMORPHONE HYDROCHLORIDE 0.5 MG: 1 INJECTION, SOLUTION INTRAMUSCULAR; INTRAVENOUS; SUBCUTANEOUS at 02:08

## 2023-08-30 RX ADMIN — SUGAMMADEX 200 MG: 100 INJECTION, SOLUTION INTRAVENOUS at 01:08

## 2023-08-30 RX ADMIN — FENTANYL CITRATE 100 MCG: 50 INJECTION, SOLUTION INTRAMUSCULAR; INTRAVENOUS at 01:08

## 2023-08-30 RX ADMIN — SODIUM CHLORIDE: 0.9 INJECTION, SOLUTION INTRAVENOUS at 01:08

## 2023-08-30 RX ADMIN — GENTAMICIN SULFATE 400 MG: 40 INJECTION, SOLUTION INTRAMUSCULAR; INTRAVENOUS at 01:08

## 2023-08-30 NOTE — DISCHARGE INSTRUCTIONS
DISCHARGE INSTRUCTIONS:  Patient should extract stent with externalized string. Remove plastic dressing from labia and pull string out with string on Monday 9/4/2023    FOLLOW UP: In clinic in 6 weeks      Remove Scopolamine patch on post op day 2 (Friday)       Wash hands thoroughly after removing patch and wash area where patch was placed.    Fold in half and discard in garbage.

## 2023-08-30 NOTE — ANESTHESIA POSTPROCEDURE EVALUATION
Anesthesia Post Evaluation    Patient: Rubina Gonzalez    Procedure(s) Performed: Procedure(s) (LRB):  CYSTOURETEROSCOPY,WITH HOLMIUM LASER LITHOTRIPSY OF URETERAL CALCULUS (Left)  INSERTION, STENT, URETER (Left)    Final Anesthesia Type: general      Patient location during evaluation: PACU  Patient participation: Yes- Able to Participate  Level of consciousness: awake and alert  Post-procedure vital signs: reviewed and stable  Pain management: adequate  Airway patency: patent    PONV status at discharge: No PONV  Anesthetic complications: no      Cardiovascular status: stable  Respiratory status: room air  Hydration status: euvolemic  Follow-up not needed.          Vitals Value Taken Time   /67 08/30/23 1420   Temp 36.8 °C (98.2 °F) 08/30/23 1419   Pulse 79 08/30/23 1424   Resp 21 08/30/23 1424   SpO2 91 % 08/30/23 1424   Vitals shown include unvalidated device data.      No case tracking events are documented in the log.      Pain/Adela Score: Pain Rating Prior to Med Admin: 5 (8/30/2023  2:11 PM)  Adela Score: 9 (8/30/2023  2:06 PM)

## 2023-08-30 NOTE — ANESTHESIA PREPROCEDURE EVALUATION
08/30/2023  Rubina Gonzalez is a 43 y.o., female.      Pre-op Assessment    I have reviewed the Patient Summary Reports.    I have reviewed the NPO Status.   I have reviewed the Medications.     Review of Systems  Anesthesia Hx:  Personal Hx of Anesthesia complications, Post-Operative Nausea/Vomiting, with every anesthetic, treatment not known   Social:  Non-Smoker    Hematology/Oncology:  Hematology Normal   Oncology Normal     EENT/Dental:EENT/Dental Normal   Cardiovascular:   Exercise tolerance: good Hypertension    Pulmonary:  Pulmonary Normal    Hepatic/GI:  Hepatic/GI Normal    Neurological:  Neurology Normal    Endocrine:  Morbid Obesity / BMI > 40  Psych:   Psychiatric History          Physical Exam  General: Well nourished and Cooperative    Airway:  Mallampati: I   Mouth Opening: Normal  TM Distance: Normal  Tongue: Normal  Neck ROM: Normal ROM    Dental:  Intact    Chest/Lungs:  Normal Respiratory Rate    Heart:  Rate: Normal        Anesthesia Plan  Type of Anesthesia, risks & benefits discussed:    Anesthesia Type: Gen ETT, Gen Supraglottic Airway  Intra-op Monitoring Plan: Standard ASA Monitors  Post Op Pain Control Plan: multimodal analgesia and IV/PO Opioids PRN  Induction:  IV  Airway Plan: Video and Direct  Informed Consent: Informed consent signed with the Patient and all parties understand the risks and agree with anesthesia plan.  All questions answered.   ASA Score: 3  Day of Surgery Review of History & Physical: H&P Update referred to the surgeon/provider.    Ready For Surgery From Anesthesia Perspective.     .

## 2023-08-30 NOTE — PLAN OF CARE
Discharge instructions given and explained to patient and family with verbalization of understanding all instructions. Prescription given and explained next time and doses of each medication. X 2 doses of IV pain medication given with moderate relief. X 1 antiemetic given with relief. Patient ambulated independently.  Instructions for recovery given with all questions answered appropriately. Patients v/s stable, denies n/v and tolerating po, rates pain level tolerable, IV removed, and family at bedside for patient discharge home.

## 2023-08-30 NOTE — DISCHARGE SUMMARY
[facility]  Discharge Note  Short Stay    Procedure(s) (LRB):  CYSTOURETEROSCOPY,WITH HOLMIUM LASER LITHOTRIPSY OF URETERAL CALCULUS (Left)      OUTCOME: Patient tolerated treatment/procedure well without complication and is now ready for discharge.    DISPOSITION: Home or Self Care    FINAL DIAGNOSIS:  Left Urolithiasis    FOLLOWUP: In clinic in 6 weeks    DISCHARGE INSTRUCTIONS:  Patient should extract stent with externalized string. Remove plastic dressing from labia and pull string out with string.     TIME SPENT ON DISCHARGE: 15 minutes

## 2023-08-30 NOTE — PLAN OF CARE
Pt in preop bay 42, VSS, meds given and IV inserted. Pt denies any open wounds on body or the use of any weight loss injections. Pt needs procedural consents and an admit order placed, otherwise ready to roll.

## 2023-08-30 NOTE — OP NOTE
Ureteroscopic Stone Extraction Operative Note  8/30/2023    Preoperative Diagnosis:   Urolithiasis    Postoperative Diagnosis:  Urolithiasis    Procedure:  Ureteroscopic stone extraction with lithotripsy and ureteral stent placement (left ureter) (00424)  Ureteroscopic stone extraction without lithotripsy with basket extraction of stone (left kidney) (34232)    Attending Surgeon: Luis Hussein MD    Anesthesia: General Endotracheal    EBL: <10 mL    Complications: None    Findings: No remaining stone fragments were seen    Drains: 6 x 24 cm left ureteral stent with externalized string    Reason for procedure: Rubina Gonzalez is a very pleasant 43 y.o. female who presented with a history of urolithiasis. She reports flank pain consistent with obstruction. Recent imaging demonstrates obstructive urolithiasis.     Procedure in detail:  Informed consent was obtained by explaining all risks and benefits of the procedure to the patient in detail.  After informed consent, the patient was brought to the OR where General Endotracheal anesthesia  was administered by the anesthesia staff. Appropriate perioperative antibiotics were given within 30 minutes of beginning the procedure. A formal timeout was performed prior to the procedure. After induction, the patient was gently placed in lithotomy position with all pressure points padded. Bilateral sequential compression devices were applied and activated prior to induction. The patient was prepped and draped in standard fashion.    A 20 Ivorian rigid cystoscope was passed per urethra into the bladder. Inspection of the bladder demonstrated no primary bladder pathology and the suspected urolithiasis was not seen to have passed into the bladder. The left ureteral orifice was cannulated with a 5 Ivorian open-ended ureteral catheter and a retrograde pyelogram was performed to demonstrate any hydroureteronephrosis from obstruction or filling defects from urolithiasis as well as to  define the ureter. A flexible-tipped Glidewire was advanced into the ureter under fluoroscopic vision.     With a safety wire in place, the semi-rigid ureteroscope was advanced to the level of the distal ureteral stone. the stone was fragmented with the holmium laser into fragments appropriate for extraction.   All stone fragments were irrigated from the ureter or extracted with basket .     Next, attention was turned to the 2 mm left renal stone that was also seen. A dual-lumen ureteral catheter was used to place a second working wire. With a working wire and safety wire in place, the flexible ureterscope was advanced to the level of the stone.     Under direct vision, the stone and fragments were easily basket extracted due to small size without ureteral trauma.  No significant stone fragments were appreciated at the end of the procedure. Left ureteral stent was left in place with externalized string.     She tolerated the procedure well and was extubated and transferred in stable condition to the recovery room.     We will plan to see her back in 4-6 weeks for continued evaluation including metabolic evaluation if appropriate. She should extract the stent in 5 days.

## 2023-08-30 NOTE — TRANSFER OF CARE
Anesthesia Transfer of Care Note    Patient: Rubina Gonzalez    Procedure(s) Performed: Procedure(s) (LRB):  CYSTOURETEROSCOPY,WITH HOLMIUM LASER LITHOTRIPSY OF URETERAL CALCULUS (Left)  INSERTION, STENT, URETER (Left)    Patient location: PACU    Transport from OR: Transported from OR on 2-3 L/min O2 by NC with adequate spontaneous ventilation    Post pain: adequate analgesia    Post assessment: no apparent anesthetic complications    Post vital signs: stable    Level of consciousness: awake, alert and oriented    Nausea/Vomiting: no nausea/vomiting    Complications: none    Transfer of care protocol was followed      Last vitals:   Visit Vitals  BP (!) 152/76   Pulse 96   Temp 37.5 °C (99.5 °F) (Temporal)   Resp 16   Wt 121.4 kg (267 lb 10.2 oz)   SpO2 100%   Breastfeeding No   BMI 47.41 kg/m²

## 2023-08-31 ENCOUNTER — PATIENT MESSAGE (OUTPATIENT)
Dept: UROLOGY | Facility: CLINIC | Age: 43
End: 2023-08-31
Payer: COMMERCIAL

## 2023-09-08 LAB
COMPN STONE: NORMAL
LABORATORY COMMENT REPORT: NORMAL
SPECIMEN SOURCE: NORMAL
STONE ANALYSIS IR-IMP: NORMAL

## 2023-09-12 NOTE — PROGRESS NOTES
"Subjective:      Chief Complaint: Post op eval and Anxiety     HPI  The patient location is:  patient's home  The chief complaint leading to consultation is: Rx  Visit type: Virtual visit with synchronous audio and video  Total time spent with patient: 20 mins   Each patient to whom he or she provides medical services by telemedicine is:  (1) informed of the relationship between the physician and patient and the respective role of any other health care provider with respect to management of the patient; and (2) notified that he or she may decline to receive medical services by telemedicine and may withdraw from such care at any time.  Patient agreed to this telemedicine visit today in an effort to avoid face-to-face visits due to the current COVID 19 pandemic.      Ms.Kristine Gonzalez is a 43-year-old woman with pertinent medical history including anxiety/depression (off of Lexapro 10 for a few months and Xanax 1 b.i.d. p.r.n.) & hypertension (losartan 25) presenting to discuss underlying anxiety and recent surgery:    Status post "CYSTOURETEROSCOPY,WITH HOLMIUM LASER LITHOTRIPSY OF URETERAL CALCULUS (Left)":  - menstrual was made to her in the postoperative period of potential arrhythmia, reminded her distant structurally abnormal cardiogram (Ej prior to current EMR) for which was lost to follow-up.    Anxiety:   -following self discontinuation of Lexapro (intolerable due to libido suppression), anxiety symptoms (particularly nighttime) have significantly worsened.    -continues to work with a cognitive behavioral therapist  -daughter takes Prozac  -current symptoms are anxiety predominant        Review of Systems   Constitutional:  Negative for appetite change, chills and fever.   HENT: Negative.     Respiratory:  Negative for cough, chest tightness and shortness of breath.    Cardiovascular:  Negative for chest pain, palpitations and leg swelling.   Gastrointestinal:  Negative for abdominal distention, abdominal " pain, blood in stool, constipation, diarrhea, nausea and vomiting.   Endocrine: Negative.    Genitourinary:  Negative for difficulty urinating, dysuria, frequency and hematuria.   Musculoskeletal: Negative.    Integumentary:  Negative.   Neurological: Negative.    Psychiatric/Behavioral:  The patient is nervous/anxious.          Objective:      There were no vitals filed for this visit.   Physical Exam  Vitals reviewed: Unable to obtain given constraints of telemedicine.       Current Outpatient Medications on File Prior to Visit   Medication Sig Dispense Refill    ALPRAZolam (XANAX) 1 MG tablet TAKE 1 TABLET(1 MG) BY MOUTH TWICE DAILY 30 tablet 1    amitriptyline (ELAVIL) 25 MG tablet Take 1 tablet (25 mg total) by mouth nightly as needed for Insomnia. 90 tablet 1    ergocalciferol, vitamin D2, (VITAMIN D ORAL) Take by mouth.      HYDROcodone-acetaminophen (NORCO) 5-325 mg per tablet       LINZESS 72 mcg Cap capsule Take 1 capsule (72 mcg total) by mouth before breakfast. 90 capsule 1    losartan (COZAAR) 25 MG tablet Take 25 mg by mouth.      methen-sod phos-meth blue-hyos (UROGESIC-BLUE) 81.6-40.8-0.12 mg Tab Take 1 tablet by mouth 3 (three) times daily as needed (bladder pain). 60 tablet 3    ondansetron (ZOFRAN-ODT) 4 MG TbDL DISSOLVE 1 TABLET ON THE TONGUE EVERY 8 HOURS FOR UP TO 2 DAYS AS NEEDED FOR NAUSEA      potassium citrate (UROCIT-K) 10 mEq (1,080 mg) TbSR Take 10 mEq by mouth 3 (three) times daily.      tamsulosin (FLOMAX) 0.4 mg Cap TAKE 1 CAPSULE BY MOUTH DAILY AS NEEDED FOR KIDNEY STONE FLARE      [DISCONTINUED] EScitalopram oxalate (LEXAPRO) 10 MG tablet Take 10 mg by mouth once daily.       No current facility-administered medications on file prior to visit.         Assessment:       1. Structural disorder of heart    2. Anxiety disorder, unspecified type        Plan:       Structural disorder of heart  -     Echo; Future   - will acquire baseline echo and consider accelerating evaluation  including cardiology establishment if she either becomes symptomatic or any significant structural/motion abnormality is noted.      Anxiety disorder, unspecified type   - transition Lexapro to Zoloft at lowest dose and follow-up in 6 weeks; can consider either uptitration point 4 transition to Prozac (would not be my 1st choice for her but tolerance in a genetic relative increases likelihood of success)   - will also add urine q.h.s. trazodone for the time being and use necessity for use as an index of Zoloft adequacy    Other orders  -     traZODone (DESYREL) 50 MG tablet; Take 1 tablet (50 mg total) by mouth every evening.  Dispense: 90 tablet; Refill: 3  -     sertraline (ZOLOFT) 25 MG tablet; Take 1 tablet (25 mg total) by mouth once daily.  Dispense: 90 tablet; Refill: 0

## 2023-09-13 ENCOUNTER — OFFICE VISIT (OUTPATIENT)
Dept: INTERNAL MEDICINE | Facility: CLINIC | Age: 43
End: 2023-09-13
Payer: COMMERCIAL

## 2023-09-13 DIAGNOSIS — F41.9 ANXIETY DISORDER, UNSPECIFIED TYPE: ICD-10-CM

## 2023-09-13 DIAGNOSIS — I51.89 STRUCTURAL DISORDER OF HEART: Primary | ICD-10-CM

## 2023-09-13 PROCEDURE — 99214 PR OFFICE/OUTPT VISIT, EST, LEVL IV, 30-39 MIN: ICD-10-PCS | Mod: 95,,, | Performed by: STUDENT IN AN ORGANIZED HEALTH CARE EDUCATION/TRAINING PROGRAM

## 2023-09-13 PROCEDURE — 99214 OFFICE O/P EST MOD 30 MIN: CPT | Mod: 95,,, | Performed by: STUDENT IN AN ORGANIZED HEALTH CARE EDUCATION/TRAINING PROGRAM

## 2023-09-13 RX ORDER — TRAZODONE HYDROCHLORIDE 50 MG/1
50 TABLET ORAL NIGHTLY
Qty: 90 TABLET | Refills: 3 | Status: SHIPPED | OUTPATIENT
Start: 2023-09-13 | End: 2023-10-26 | Stop reason: SDUPTHER

## 2023-09-13 RX ORDER — SERTRALINE HYDROCHLORIDE 25 MG/1
25 TABLET, FILM COATED ORAL DAILY
Qty: 90 TABLET | Refills: 0 | Status: SHIPPED | OUTPATIENT
Start: 2023-09-13 | End: 2023-10-26 | Stop reason: SDUPTHER

## 2023-09-13 NOTE — Clinical Note
Please assist in scheduling ordered echocardiogram and 6 week medication follow-up.    Thank you for your time.

## 2023-09-18 ENCOUNTER — TELEPHONE (OUTPATIENT)
Dept: SURGERY | Facility: CLINIC | Age: 43
End: 2023-09-18
Payer: COMMERCIAL

## 2023-10-11 ENCOUNTER — TELEPHONE (OUTPATIENT)
Dept: UROLOGY | Facility: CLINIC | Age: 43
End: 2023-10-11
Payer: COMMERCIAL

## 2023-10-11 NOTE — TELEPHONE ENCOUNTER
Rescheduled pt's appt to 10/20 at 8:40 am    ----- Message from Cecile Cota sent at 10/11/2023  2:13 PM CDT -----  Regarding: Reschedule post-op appt 10/13  Contact: [phone]  Patient is calling to reschedule appointment. Please contact patient to further discuss.

## 2023-10-20 ENCOUNTER — OFFICE VISIT (OUTPATIENT)
Dept: UROLOGY | Facility: CLINIC | Age: 43
End: 2023-10-20
Payer: COMMERCIAL

## 2023-10-20 VITALS
HEART RATE: 79 BPM | SYSTOLIC BLOOD PRESSURE: 151 MMHG | WEIGHT: 260.81 LBS | BODY MASS INDEX: 46.2 KG/M2 | DIASTOLIC BLOOD PRESSURE: 92 MMHG

## 2023-10-20 DIAGNOSIS — N20.0 CALCULUS OF KIDNEY: Primary | ICD-10-CM

## 2023-10-20 PROCEDURE — 99999 PR PBB SHADOW E&M-EST. PATIENT-LVL III: ICD-10-PCS | Mod: PBBFAC,,, | Performed by: UROLOGY

## 2023-10-20 PROCEDURE — 99214 PR OFFICE/OUTPT VISIT, EST, LEVL IV, 30-39 MIN: ICD-10-PCS | Mod: S$GLB,,, | Performed by: UROLOGY

## 2023-10-20 PROCEDURE — 99214 OFFICE O/P EST MOD 30 MIN: CPT | Mod: S$GLB,,, | Performed by: UROLOGY

## 2023-10-20 PROCEDURE — 99999 PR PBB SHADOW E&M-EST. PATIENT-LVL III: CPT | Mod: PBBFAC,,, | Performed by: UROLOGY

## 2023-10-20 NOTE — PROGRESS NOTES
[facility] U[facility]      Urology Urolithiasis Post-operative Note    10/20/2023    Referred by:  No ref. provider found    Procedure: Ureteroscopic stone extraction (left)  Time Since Procedure: 6-8 weeks    HPI: Rubina Gonzalez is a very pleasant 43 y.o. female following up for post-operative visit. A ureteral stent was left in place at the end of the procedure but has been confirmed to have been removed by patient via externalized string. She reports doing well since surgery without new complaints. Stone clearance was confirmed at time of procedure and no symptoms today suggest ongoing urolithiasis. Further imaging is not indicated at this time.     A review of 10+ systems was conducted with pertinent positive and negative findings documented in HPI with all other systems reviewed and negative.    Past medical, family, surgical and social history reviewed as documented in chart with pertinent positive medical, family, surgical and social history detailed in HPI.    Exam Findings:    Const: no acute distress, conversant and alert  Eyes: anicteric, extraocular muscles intact  ENMT: normocephalic, Nl oral membranes  Cardio: no cyanosis, nl cap refill  Pulm: no tachypnea; no resp distress  Abd: soft, no tenderness  Musc: no laceration, no tenderness  Neuro: alert; oriented x 3  Skin: warm, dry; no petichiae  Psych: no anxiety; normal speech     Metabolic Evaluation:   I have made a recommendation to complete a metabolic evaluation including completion of 24-hour urine for metabolic stone risk. We will discuss the results of this on the next clinic visit.    Assessment/Plan:   We will have the patient return to clinic for review of the findings of the metabolic evaluation.We have scheduled a virtual visit in a few weeks.

## 2023-10-23 DIAGNOSIS — N20.0 CALCULUS OF KIDNEY: Primary | ICD-10-CM

## 2023-10-26 ENCOUNTER — OFFICE VISIT (OUTPATIENT)
Dept: INTERNAL MEDICINE | Facility: CLINIC | Age: 43
End: 2023-10-26
Payer: COMMERCIAL

## 2023-10-26 VITALS
BODY MASS INDEX: 46.99 KG/M2 | HEART RATE: 81 BPM | HEIGHT: 63 IN | WEIGHT: 265.19 LBS | SYSTOLIC BLOOD PRESSURE: 132 MMHG | RESPIRATION RATE: 18 BRPM | OXYGEN SATURATION: 96 % | DIASTOLIC BLOOD PRESSURE: 88 MMHG | TEMPERATURE: 98 F

## 2023-10-26 DIAGNOSIS — F41.9 ANXIETY DISORDER, UNSPECIFIED TYPE: Primary | ICD-10-CM

## 2023-10-26 PROCEDURE — 99214 OFFICE O/P EST MOD 30 MIN: CPT | Mod: S$GLB,,, | Performed by: STUDENT IN AN ORGANIZED HEALTH CARE EDUCATION/TRAINING PROGRAM

## 2023-10-26 PROCEDURE — 99214 PR OFFICE/OUTPT VISIT, EST, LEVL IV, 30-39 MIN: ICD-10-PCS | Mod: S$GLB,,, | Performed by: STUDENT IN AN ORGANIZED HEALTH CARE EDUCATION/TRAINING PROGRAM

## 2023-10-26 PROCEDURE — 99999 PR PBB SHADOW E&M-EST. PATIENT-LVL IV: ICD-10-PCS | Mod: PBBFAC,,, | Performed by: STUDENT IN AN ORGANIZED HEALTH CARE EDUCATION/TRAINING PROGRAM

## 2023-10-26 PROCEDURE — 99999 PR PBB SHADOW E&M-EST. PATIENT-LVL IV: CPT | Mod: PBBFAC,,, | Performed by: STUDENT IN AN ORGANIZED HEALTH CARE EDUCATION/TRAINING PROGRAM

## 2023-10-26 RX ORDER — TRAZODONE HYDROCHLORIDE 100 MG/1
100 TABLET ORAL NIGHTLY
Qty: 90 TABLET | Refills: 3 | Status: SHIPPED | OUTPATIENT
Start: 2023-10-26 | End: 2024-10-25

## 2023-10-26 RX ORDER — SERTRALINE HYDROCHLORIDE 50 MG/1
50 TABLET, FILM COATED ORAL DAILY
Qty: 90 TABLET | Refills: 1 | Status: SHIPPED | OUTPATIENT
Start: 2023-10-26 | End: 2024-10-25

## 2023-10-26 NOTE — PROGRESS NOTES
Subjective:      Chief Complaint: 6 week follow up (zoloft)    HPI  Ms.Kristine Gonzalez is a 43-year-old woman with pertinent medical history including anxiety/depression (off of Lexapro 10 for a few months and Xanax 1 b.i.d. p.r.n.) & hypertension (losartan 25) presenting to follow-up as below:     Anxiety:   -following self discontinuation of Lexapro (intolerable due to libido suppression), anxiety symptoms (particularly nighttime) significantly worsened prompting initiation of low-dose generic Zoloft and trazodone 50 q.h.s. 6 weeks ago with significant effect (overt panic symptoms have all but been eliminated); that said, there is subjectively more gain to be had  -continues to work with a cognitive behavioral therapist    Review of Systems   Constitutional:  Negative for appetite change, chills and fever.   HENT: Negative.     Respiratory:  Negative for cough, chest tightness and shortness of breath.    Cardiovascular:  Negative for chest pain, palpitations and leg swelling.   Gastrointestinal:  Negative for abdominal distention, abdominal pain, blood in stool, constipation, diarrhea, nausea and vomiting.   Endocrine: Negative.    Genitourinary:  Negative for difficulty urinating, dysuria, frequency and hematuria.   Musculoskeletal: Negative.    Integumentary:  Negative.   Neurological: Negative.    Psychiatric/Behavioral:  The patient is nervous/anxious.          Objective:      Vitals:    10/26/23 1437   BP: 132/88   Pulse: 81   Resp: 18   Temp: 97.5 °F (36.4 °C)      Physical Exam  Vitals reviewed.   Constitutional:       General: She is not in acute distress.     Appearance: Normal appearance.   HENT:      Head: Normocephalic and atraumatic.      Comments: Facial features are symmetric      Nose: Nose normal. No congestion or rhinorrhea.      Mouth/Throat:      Mouth: Mucous membranes are moist.      Pharynx: Oropharynx is clear. No oropharyngeal exudate or posterior oropharyngeal erythema.   Eyes:       General: No scleral icterus.     Extraocular Movements: Extraocular movements intact.      Conjunctiva/sclera: Conjunctivae normal.   Cardiovascular:      Rate and Rhythm: Normal rate and regular rhythm.      Pulses: Normal pulses.      Heart sounds: Normal heart sounds.   Pulmonary:      Effort: Pulmonary effort is normal. No respiratory distress.      Breath sounds: Normal breath sounds.   Musculoskeletal:         General: No deformity or signs of injury. Normal range of motion.      Cervical back: Normal range of motion.      Comments: Gait normal    Skin:     General: Skin is warm and dry.      Findings: No rash.   Neurological:      General: No focal deficit present.      Mental Status: She is alert and oriented to person, place, and time. Mental status is at baseline.   Psychiatric:         Mood and Affect: Mood normal.         Behavior: Behavior normal.         Thought Content: Thought content normal.       Current Outpatient Medications on File Prior to Visit   Medication Sig Dispense Refill    ALPRAZolam (XANAX) 1 MG tablet TAKE 1 TABLET(1 MG) BY MOUTH TWICE DAILY 30 tablet 1    LINZESS 72 mcg Cap capsule Take 1 capsule (72 mcg total) by mouth before breakfast. 90 capsule 1    losartan (COZAAR) 25 MG tablet Take 25 mg by mouth.      [DISCONTINUED] sertraline (ZOLOFT) 25 MG tablet Take 1 tablet (25 mg total) by mouth once daily. 90 tablet 0    [DISCONTINUED] traZODone (DESYREL) 50 MG tablet Take 1 tablet (50 mg total) by mouth every evening. 90 tablet 3    amitriptyline (ELAVIL) 25 MG tablet Take 1 tablet (25 mg total) by mouth nightly as needed for Insomnia. (Patient not taking: Reported on 10/26/2023) 90 tablet 1    ergocalciferol, vitamin D2, (VITAMIN D ORAL) Take by mouth.      HYDROcodone-acetaminophen (NORCO) 5-325 mg per tablet       ondansetron (ZOFRAN-ODT) 4 MG TbDL DISSOLVE 1 TABLET ON THE TONGUE EVERY 8 HOURS FOR UP TO 2 DAYS AS NEEDED FOR NAUSEA      potassium citrate (UROCIT-K) 10 mEq (1,080 mg)  TbSR Take 10 mEq by mouth 3 (three) times daily.      tamsulosin (FLOMAX) 0.4 mg Cap TAKE 1 CAPSULE BY MOUTH DAILY AS NEEDED FOR KIDNEY STONE FLARE       No current facility-administered medications on file prior to visit.         Assessment:       1. Anxiety disorder, unspecified type        Plan:       Anxiety disorder, unspecified type   - will increase both dose of daily Zoloft and p.r.n. q.h.s. trazodone   - return to clinic p.r.n.    Other orders  -     sertraline (ZOLOFT) 50 MG tablet; Take 1 tablet (50 mg total) by mouth once daily.  Dispense: 90 tablet; Refill: 1  -     traZODone (DESYREL) 100 MG tablet; Take 1 tablet (100 mg total) by mouth every evening.  Dispense: 90 tablet; Refill: 3

## 2023-11-01 ENCOUNTER — PATIENT MESSAGE (OUTPATIENT)
Dept: INTERNAL MEDICINE | Facility: CLINIC | Age: 43
End: 2023-11-01
Payer: COMMERCIAL

## 2023-11-01 RX ORDER — LOSARTAN POTASSIUM 25 MG/1
25 TABLET ORAL DAILY
Qty: 90 TABLET | Refills: 3 | Status: SHIPPED | OUTPATIENT
Start: 2023-11-01

## 2023-11-01 NOTE — TELEPHONE ENCOUNTER
No care due was identified.  [facility] Embedded Care Due Messages. Reference number: [id number].   11/01/2023 1:44:28 PM CDT

## 2024-01-15 ENCOUNTER — PATIENT MESSAGE (OUTPATIENT)
Dept: OBSTETRICS AND GYNECOLOGY | Facility: CLINIC | Age: 44
End: 2024-01-15
Payer: COMMERCIAL

## 2024-01-15 DIAGNOSIS — R39.89 SUSPECTED UTI: Primary | ICD-10-CM

## 2024-01-16 RX ORDER — NITROFURANTOIN 25; 75 MG/1; MG/1
100 CAPSULE ORAL 2 TIMES DAILY
Qty: 14 CAPSULE | Refills: 0 | Status: SHIPPED | OUTPATIENT
Start: 2024-01-16 | End: 2024-01-26

## 2024-01-29 DIAGNOSIS — K59.00 CONSTIPATION, UNSPECIFIED CONSTIPATION TYPE: ICD-10-CM

## 2024-01-29 RX ORDER — LINACLOTIDE 72 UG/1
72 CAPSULE, GELATIN COATED ORAL
Qty: 90 CAPSULE | Refills: 1 | Status: SHIPPED | OUTPATIENT
Start: 2024-01-29

## 2024-02-16 ENCOUNTER — PATIENT MESSAGE (OUTPATIENT)
Dept: INTERNAL MEDICINE | Facility: CLINIC | Age: 44
End: 2024-02-16
Payer: COMMERCIAL

## 2024-02-16 NOTE — TELEPHONE ENCOUNTER
Pt is attempting to stop taking her Zoloft medication completely.  Off for 2 days, and feeling lightheaded.

## 2024-02-20 DIAGNOSIS — F41.9 ANXIETY DISORDER, UNSPECIFIED TYPE: ICD-10-CM

## 2024-02-20 RX ORDER — ALPRAZOLAM 1 MG/1
1 TABLET ORAL 2 TIMES DAILY
Qty: 30 TABLET | Refills: 0 | Status: SHIPPED | OUTPATIENT
Start: 2024-02-20 | End: 2024-06-03 | Stop reason: SDUPTHER

## 2024-03-06 ENCOUNTER — PATIENT MESSAGE (OUTPATIENT)
Dept: OBSTETRICS AND GYNECOLOGY | Facility: CLINIC | Age: 44
End: 2024-03-06
Payer: COMMERCIAL

## 2024-03-12 ENCOUNTER — OFFICE VISIT (OUTPATIENT)
Dept: OBSTETRICS AND GYNECOLOGY | Facility: CLINIC | Age: 44
End: 2024-03-12
Payer: COMMERCIAL

## 2024-03-12 DIAGNOSIS — N92.0 SPOTTING: Primary | ICD-10-CM

## 2024-03-12 DIAGNOSIS — F41.9 ANXIETY: ICD-10-CM

## 2024-03-12 PROCEDURE — 99214 OFFICE O/P EST MOD 30 MIN: CPT | Mod: 95,,, | Performed by: OBSTETRICS & GYNECOLOGY

## 2024-03-12 RX ORDER — BUPROPION HYDROCHLORIDE 150 MG/1
150 TABLET ORAL EVERY MORNING
Qty: 30 TABLET | Refills: 11 | Status: SHIPPED | OUTPATIENT
Start: 2024-03-12 | End: 2024-06-05

## 2024-03-14 NOTE — PROGRESS NOTES
The patient location is: [facility]  The chief complaint leading to consultation is: spotting   Visit type: audiovisual  Total time spent with patient: 15    Each patient to whom he or she provides medical services by telemedicine is:  (1) informed of the relationship between the physician and patient and the respective role of any other health care provider with respect to management of the patient; and (2) notified that he or she may decline to receive medical services by telemedicine and may withdraw from such care at any time.    Notes:     Chief Complaint: spotting post ablation     HPI:      Rubina Gonzalez is a 43 y.o.  who presents complaining of spotting x one episode post ablation .    Has had some light pink spotting - she has not had a period since my ablation in 2017.   Talked today tht periods can return and may be light   Time will tell if monthly cycles recur- for now will get u/s for peace of mind and to eval uterus for polyp oir fibroid    Also, likes zoloft which is helping with anxiety but having sexual side effects - having trouble with orgasm We talked about options to change meds to Viibryd or Pristiq but also discussed adding Wellbutrin to see if this helps       No LMP recorded. Patient has had an ablation.      ROS:     GENERAL: Denies fevers or chills. Feeling well overall.   ABDOMEN: Denies abdominal pain, constipation, diarrhea, nausea, vomiting, change in appetite.   URINARY: Denies frequency, dysuria, hematuria.  GYNECOLOGIC: See HPI.  NEUROLOGIC: Denies syncope or weakness.     Physical Exam:      PHYSICAL EXAM:  There were no vitals taken for this visit.  There is no height or weight on file to calculate BMI.     APPEARANCE: Well nourished, well developed, in no acute distress.  Exam deferred due to televisit    Results:          Assessment/Plan:     Spotting  -     US Pelvis Comp with Transvag NON-OB (xpd; Future; Expected date: 2024    Anxiety  -     buPROPion  (WELLBUTRIN XL) 150 MG TB24 tablet; Take 1 tablet (150 mg total) by mouth every morning.  Dispense: 30 tablet; Refill: 11          Counseling:       Use of the [employer] Patient Portal discussed and encouraged during today's visit.

## 2024-04-15 ENCOUNTER — PATIENT MESSAGE (OUTPATIENT)
Dept: OBSTETRICS AND GYNECOLOGY | Facility: CLINIC | Age: 44
End: 2024-04-15
Payer: COMMERCIAL

## 2024-04-15 DIAGNOSIS — Z12.31 BREAST CANCER SCREENING BY MAMMOGRAM: ICD-10-CM

## 2024-04-15 DIAGNOSIS — Z80.3 FAMILY HISTORY OF BREAST CANCER IN MOTHER: Primary | ICD-10-CM

## 2024-04-15 DIAGNOSIS — Z98.890 HISTORY OF LUMPECTOMY OF RIGHT BREAST: ICD-10-CM

## 2024-04-19 ENCOUNTER — PATIENT MESSAGE (OUTPATIENT)
Dept: INTERNAL MEDICINE | Facility: CLINIC | Age: 44
End: 2024-04-19
Payer: COMMERCIAL

## 2024-04-24 ENCOUNTER — PATIENT MESSAGE (OUTPATIENT)
Dept: OBSTETRICS AND GYNECOLOGY | Facility: CLINIC | Age: 44
End: 2024-04-24
Payer: COMMERCIAL

## 2024-04-25 NOTE — TELEPHONE ENCOUNTER
Sent pt portal msg to schedule virtual appt. Offered earliest available on 5/20/24 @ 4:15 PM. Informed pt that I could also arrange for her to speak with another provider.

## 2024-04-25 NOTE — TELEPHONE ENCOUNTER
Please offer her and schedule a virtual visit to go over all of her concerns and answer any questions..  I am thinking this may be a lengthier conversation than a message back

## 2024-05-08 ENCOUNTER — PATIENT MESSAGE (OUTPATIENT)
Dept: INTERNAL MEDICINE | Facility: CLINIC | Age: 44
End: 2024-05-08
Payer: COMMERCIAL

## 2024-05-09 NOTE — TELEPHONE ENCOUNTER
See her msg.   Should have weaned off?   You don't have an opening for 2 weeks.    Anything we can recommend?

## 2024-05-14 ENCOUNTER — PATIENT MESSAGE (OUTPATIENT)
Dept: OBSTETRICS AND GYNECOLOGY | Facility: CLINIC | Age: 44
End: 2024-05-14
Payer: COMMERCIAL

## 2024-05-18 ENCOUNTER — PATIENT MESSAGE (OUTPATIENT)
Dept: INTERNAL MEDICINE | Facility: CLINIC | Age: 44
End: 2024-05-18
Payer: COMMERCIAL

## 2024-05-27 ENCOUNTER — OFFICE VISIT (OUTPATIENT)
Dept: OBSTETRICS AND GYNECOLOGY | Facility: CLINIC | Age: 44
End: 2024-05-27
Payer: COMMERCIAL

## 2024-05-27 DIAGNOSIS — D25.9 UTERINE LEIOMYOMA, UNSPECIFIED LOCATION: Primary | ICD-10-CM

## 2024-05-27 PROCEDURE — 99213 OFFICE O/P EST LOW 20 MIN: CPT | Mod: 95,,, | Performed by: OBSTETRICS & GYNECOLOGY

## 2024-05-27 PROCEDURE — 4010F ACE/ARB THERAPY RXD/TAKEN: CPT | Mod: CPTII,95,, | Performed by: OBSTETRICS & GYNECOLOGY

## 2024-05-27 NOTE — PROGRESS NOTES
The patient location is: [facility]  The chief complaint leading to consultation is: fibroids on u/s   Visit type: audiovisual  Total time spent with patient: 20min    Each patient to whom he or she provides medical services by telemedicine is:  (1) informed of the relationship between the physician and patient and the respective role of any other health care provider with respect to management of the patient; and (2) notified that he or she may decline to receive medical services by telemedicine and may withdraw from such care at any time.    Notes:     Chief Complaint:  fibroids on u/s      HPI:      Rubina Gonzalez is a 44 y.o.  who presents cforn u/s f/u due to  fibroids on u/s   And ovaries not seen   Impression:   Nonvisualization of the ovaries.  Mild heterogeneity of the uterine stroma with 2 less than 1 cm uterine fibroids.  Endometrial stripe within normal limits.  Reassurance given    Patient does not have regular monthly menses. No LMP recorded. Patient has had an ablation.  Also mom with breast cancer - had breast u/s with a 2cm simple cyst on MMG and u/s and has appt with breast surgeon at [facility]    ROS:     GENERAL: Denies fevers or chills. Feeling well overall.   ABDOMEN: Denies abdominal pain, constipation, diarrhea, nausea, vomiting, change in appetite.   URINARY: Denies frequency, dysuria, hematuria.  GYNECOLOGIC: See HPI.  NEUROLOGIC: Denies syncope or weakness.     Physical Exam:      PHYSICAL EXAM:  There were no vitals taken for this visit.  There is no height or weight on file to calculate BMI.     APPEARANCE: Well nourished, well developed, in no acute distress.  Exam deferred due to televisit    Results:       FINDINGS:  Uterus:     Size: 9.4 x 4.7 x 5.5 cm     Masses: Uterine fibroid 0.6 x 0.6 x 0.6 cm and 0.7 x 0.7 x 0.7 cm.  Heterogeneous appearance.     Endometrium: Normal in this pre menopausal patient, measuring 3 mm.     Right ovary:     Nonvisualized     Left ovary:      Nonvisualized     Free Fluid:     None.     Impression:     Nonvisualization of the ovaries.     Mild heterogeneity of the uterine stroma with 2 less than 1 cm uterine fibroids.  Endometrial stripe within normal limits.        Electronically signed by:Beranrdo Solorzano MD  Date:                                            04/17/2024  Time:                                           08:57      Assessment/Plan:     Uterine leiomyoma, unspecified location  -     US Pelvis Comp with Transvag NON-OB (xpd; Future; Expected date: 05/27/2024    F/u in Aug for annual  Will repeat u/s in 6 months- order placed       Counseling:       Use of the [employer] Patient Portal discussed and encouraged during today's visit.

## 2024-06-03 ENCOUNTER — PATIENT MESSAGE (OUTPATIENT)
Dept: OBSTETRICS AND GYNECOLOGY | Facility: CLINIC | Age: 44
End: 2024-06-03
Payer: COMMERCIAL

## 2024-06-03 DIAGNOSIS — F41.9 ANXIETY DISORDER, UNSPECIFIED TYPE: ICD-10-CM

## 2024-06-03 RX ORDER — ALPRAZOLAM 1 MG/1
1 TABLET ORAL 2 TIMES DAILY
Qty: 30 TABLET | Refills: 0 | Status: SHIPPED | OUTPATIENT
Start: 2024-06-03

## 2024-06-05 ENCOUNTER — OFFICE VISIT (OUTPATIENT)
Dept: OBSTETRICS AND GYNECOLOGY | Facility: CLINIC | Age: 44
End: 2024-06-05
Payer: COMMERCIAL

## 2024-06-05 VITALS — WEIGHT: 273.38 LBS | BODY MASS INDEX: 48.44 KG/M2 | HEIGHT: 63 IN

## 2024-06-05 DIAGNOSIS — B37.2 YEAST DERMATITIS: ICD-10-CM

## 2024-06-05 DIAGNOSIS — L73.2 HYDRADENITIS: Primary | ICD-10-CM

## 2024-06-05 PROCEDURE — 3008F BODY MASS INDEX DOCD: CPT | Mod: CPTII,S$GLB,, | Performed by: OBSTETRICS & GYNECOLOGY

## 2024-06-05 PROCEDURE — 4010F ACE/ARB THERAPY RXD/TAKEN: CPT | Mod: CPTII,S$GLB,, | Performed by: OBSTETRICS & GYNECOLOGY

## 2024-06-05 PROCEDURE — 99214 OFFICE O/P EST MOD 30 MIN: CPT | Mod: S$GLB,,, | Performed by: OBSTETRICS & GYNECOLOGY

## 2024-06-05 PROCEDURE — 1159F MED LIST DOCD IN RCRD: CPT | Mod: CPTII,S$GLB,, | Performed by: OBSTETRICS & GYNECOLOGY

## 2024-06-05 PROCEDURE — 99999 PR PBB SHADOW E&M-EST. PATIENT-LVL III: CPT | Mod: PBBFAC,,, | Performed by: OBSTETRICS & GYNECOLOGY

## 2024-06-05 RX ORDER — FLUCONAZOLE 150 MG/1
150 TABLET ORAL EVERY OTHER DAY
Qty: 3 TABLET | Refills: 0 | Status: SHIPPED | OUTPATIENT
Start: 2024-06-05 | End: 2024-06-11 | Stop reason: ALTCHOICE

## 2024-06-05 RX ORDER — DOXYCYCLINE 100 MG/1
100 CAPSULE ORAL 2 TIMES DAILY
Qty: 20 CAPSULE | Refills: 0 | Status: SHIPPED | OUTPATIENT
Start: 2024-06-05 | End: 2024-06-17

## 2024-06-05 NOTE — PROGRESS NOTES
"Subjective:       Patient ID: Rubina Gonzalez is a 44 y.o. female.    Chief Complaint:  Vaginal cysts      History of Present Illness  C/o chronic vaginal cysts on labia that come and go and seem to be worse with ovulation - this is the worst it has been and has been swollen and painful on right labia   "suddenly having a flare up of cysts between my legs and on the vagina. There is one that is large and painful making it difficult to walk. "    GYN & OB History  No LMP recorded. Patient has had an ablation.   Date of Last Pap: 8/7/2023         Objective:     There were no vitals filed for this visit.    Physical Exam:   Constitutional: She appears well-developed and well-nourished.             Abdominal:   Yeast rash in panus         Genitourinary:          Genitourinary Comments: 2 1cm oval shaped abscess one draining c/w hydradenitis also 5mm area on right leg with folliculitis lesions                  Skin: Skin is warm.    Psychiatric: She has a normal mood and affect. Her behavior is normal.          Assessment/ Plan:     Orders Placed This Encounter    doxycycline (VIBRAMYCIN) 100 MG Cap    fluconazole (DIFLUCAN) 150 MG Tab       Rubina Dennis" was seen today for vaginal cysts.    Diagnoses and all orders for this visit:    Hydradenitis-  chronic inflammatory condition that causes painful bump- long-term skin condition that causes skin abscesses and scarring on the skin.    Rec epsom salt bath and dial soap   -     doxycycline (VIBRAMYCIN) 100 MG Cap; Take 1 capsule (100 mg total) by mouth 2 (two) times daily. for 10 days   If not better in 2 weeks pt to let me know  rec to see Dermatologist as this is long term skin condition - like acne of vulva and legs     Yeast dermatitis-- rec Diflucan and then OTC Lotrisone spray as needed   -     fluconazole (DIFLUCAN) 150 MG Tab; Take 1 tablet (150 mg total) by mouth every other day. for 3 doses        No follow-ups on file.      Health Maintenance         Date " Due Completion Date    TETANUS VACCINE Never done ---    COVID-19 Vaccine ( season) Never done ---    Influenza Vaccine (Season Ended) 2024 ---    Mammogram 2025    Hemoglobin A1c (Diabetic Prevention Screening) 2026    Cervical Cancer Screening 2028            Answers submitted by the patient for this visit:  Gynecologic Exam Questionnaire  (Submitted on 2024)  Chief Complaint: Gynecologic exam  genital itching: No  genital lesions: Yes  genital odor: No  genital rash: Yes  missed menses: No  pelvic pain: No  vaginal bleeding: No  vaginal discharge: No  Chronicity: recurrent  Onset: in the past 7 days  Frequency: constantly  Progression since onset: gradually worsening  Pain severity: mild  Affected side: right  Pregnant now?: No  abdominal pain: No  anorexia: No  back pain: No  chills: No  constipation: No  diarrhea: No  discolored urine: No  dysuria: No  fever: No  flank pain: No  frequency: No  headaches: No  hematuria: No  nausea: No  painful intercourse: No  rash: No  urgency: No  vomiting: No  Please select the characteristics of your discharge: : normal  Vaginal bleeding: no bleeding  Passing clots?: No  Passing tissue?: No  Aggravated by: activity, tactile pressure  treatments tried: NSAIDs, warm bath  Improvement on treatment: mild  Sexual activity: sexually active  Partner with STD symptoms: no  Birth control: vasectomy  STD: No  abdominal surgery: No   section: No  Ectopic pregnancy: No  Endometriosis: No  herpes simplex: No  gynecological surgery: Yes  menorrhagia: Yes  metrorrhagia: No  miscarriage: No  ovarian cysts: Yes  perineal abscess: No  PID: No  terminated pregnancy: No  vaginosis: No

## 2024-06-10 NOTE — PROGRESS NOTES
"Subjective:         Chief Complaint: Abdominal Pain (Tightness right side x 2-3 years)    Abdominal Pain  This is a recurrent problem. The current episode started more than 1 year ago. The onset quality is sudden. The problem occurs intermittently. The problem has been waxing and waning. The pain is located in the RUQ. The pain is at a severity of 4/10. The pain is mild. The quality of the pain is cramping. Associated symptoms include arthralgias. Pertinent negatives include no anorexia, belching, constipation, diarrhea, dysuria, fever, flatus, frequency, headaches, hematochezia, hematuria, melena, myalgias, nausea, vomiting or weight loss. The pain is aggravated by certain positions. The pain is relieved by Standing. She has tried nothing for the symptoms. The treatment provided no relief. Prior diagnostic workup includes ultrasound. Her past medical history is significant for gallstones and irritable bowel syndrome. There is no history of abdominal surgery, colon cancer, Crohn's disease, GERD, pancreatitis, PUD or ulcerative colitis. Patient's medical history includes kidney stones and UTI.     Ms.Kristine Gonzalez is a 43-year-old woman with pertinent medical history including anxiety/depression (off of Lexapro 10 for a few months and Xanax 1 b.i.d. p.r.n.) & hypertension (losartan 25) presenting to discuss subjective right-sided abdominal discomfort:     Abdominal/flank "tightness":   -  describing nonspecific and non radicular tightness to RUQ x years  -  most recent CT abdomen/pelvis available for interpretation was of 8/23 and demonstrated left-sided hydronephrosis and hydroureter due to 5 x 6 mm left UVJ stone without right kidney structural abnormalities and status post cholecystectomy with central biliary system dilation ( as to be expected).     Hypertension:   - persistently hypertensive on losartan 25  - difficult to assess ambulatory blood pressures and she currently only has a wrist cuff and an upper arm " cuff that does not fit (too tight).    Review of Systems   Constitutional:  Negative for fever and weight loss.   Gastrointestinal:  Positive for abdominal pain. Negative for anorexia, constipation, diarrhea, flatus, hematochezia, melena, nausea and vomiting.   Genitourinary:  Negative for dysuria, frequency and hematuria.   Musculoskeletal:  Positive for arthralgias. Negative for myalgias.   Neurological:  Negative for headaches.         Objective:      Vitals:    06/11/24 0959   BP: (!) 156/104   Pulse: 75   Resp: 18   Temp: 97.9 °F (36.6 °C)      Physical Exam  Vitals reviewed.   Constitutional:       General: She is not in acute distress.     Appearance: Normal appearance.   HENT:      Head: Normocephalic and atraumatic.      Comments: Facial features are symmetric      Nose: Nose normal. No congestion or rhinorrhea.      Mouth/Throat:      Mouth: Mucous membranes are moist.      Pharynx: Oropharynx is clear. No oropharyngeal exudate or posterior oropharyngeal erythema.   Eyes:      General: No scleral icterus.     Extraocular Movements: Extraocular movements intact.      Conjunctiva/sclera: Conjunctivae normal.   Cardiovascular:      Rate and Rhythm: Normal rate and regular rhythm.      Pulses: Normal pulses.      Heart sounds: Normal heart sounds.   Pulmonary:      Effort: Pulmonary effort is normal. No respiratory distress.      Breath sounds: Normal breath sounds.   Musculoskeletal:         General: No deformity or signs of injury. Normal range of motion.      Cervical back: Normal range of motion.      Comments: Gait normal    Skin:     General: Skin is warm and dry.      Findings: No rash.   Neurological:      General: No focal deficit present.      Mental Status: She is alert and oriented to person, place, and time. Mental status is at baseline.   Psychiatric:         Mood and Affect: Mood normal.         Behavior: Behavior normal.         Thought Content: Thought content normal.       Current Outpatient  Medications on File Prior to Visit   Medication Sig Dispense Refill    ALPRAZolam (XANAX) 1 MG tablet Take 1 tablet (1 mg total) by mouth 2 (two) times daily. (Patient taking differently: Take 1 mg by mouth 2 (two) times daily as needed.) 30 tablet 0    budesonide-formoterol 160-4.5 mcg (SYMBICORT) 160-4.5 mcg/actuation HFAA Inhale 2 puffs into the lungs 2 (two) times daily.      doxycycline (VIBRAMYCIN) 100 MG Cap Take 1 capsule (100 mg total) by mouth 2 (two) times daily. for 10 days 20 capsule 0    LINZESS 72 mcg Cap capsule Take 1 capsule (72 mcg total) by mouth before breakfast. 90 capsule 1    losartan (COZAAR) 25 MG tablet Take 1 tablet (25 mg total) by mouth once daily. 90 tablet 3    montelukast (SINGULAIR) 10 mg tablet Take 10 mg by mouth every evening.      budesonide-glycopyr-formoterol (BREZTRI AEROSPHERE) 160-9-4.8 mcg/actuation HFAA Inhale 2 puffs into the lungs 2 (two) times a day 32.1 g 3    traZODone (DESYREL) 100 MG tablet Take 1 tablet (100 mg total) by mouth every evening. (Patient not taking: Reported on 6/11/2024) 90 tablet 3    [DISCONTINUED] fluconazole (DIFLUCAN) 150 MG Tab Take 1 tablet (150 mg total) by mouth every other day. for 3 doses 3 tablet 0     No current facility-administered medications on file prior to visit.         Assessment:       1. RUQ pain    2. Primary hypertension        Plan:       RUQ pain  -     US Abdomen Limited; Future; Expected date: 06/11/2024   - Assess right upper quadrant ultrasound and progress evaluation as needed be.      Primary hypertension   -  patient is to acquire a cuff that fits,  complete 5-10 days worth of twice daily blood pressure log (including heart rate) and follow-up in one-month ( unless currently normotensive) for hypertensive follow-up.

## 2024-06-11 ENCOUNTER — OFFICE VISIT (OUTPATIENT)
Dept: INTERNAL MEDICINE | Facility: CLINIC | Age: 44
End: 2024-06-11
Payer: COMMERCIAL

## 2024-06-11 VITALS
HEIGHT: 63 IN | HEART RATE: 75 BPM | DIASTOLIC BLOOD PRESSURE: 104 MMHG | WEIGHT: 275.38 LBS | TEMPERATURE: 98 F | RESPIRATION RATE: 18 BRPM | SYSTOLIC BLOOD PRESSURE: 156 MMHG | BODY MASS INDEX: 48.79 KG/M2 | OXYGEN SATURATION: 98 %

## 2024-06-11 DIAGNOSIS — R10.11 RUQ PAIN: Primary | ICD-10-CM

## 2024-06-11 DIAGNOSIS — I10 PRIMARY HYPERTENSION: ICD-10-CM

## 2024-06-11 PROCEDURE — 3008F BODY MASS INDEX DOCD: CPT | Mod: CPTII,S$GLB,, | Performed by: STUDENT IN AN ORGANIZED HEALTH CARE EDUCATION/TRAINING PROGRAM

## 2024-06-11 PROCEDURE — 3077F SYST BP >= 140 MM HG: CPT | Mod: CPTII,S$GLB,, | Performed by: STUDENT IN AN ORGANIZED HEALTH CARE EDUCATION/TRAINING PROGRAM

## 2024-06-11 PROCEDURE — 1159F MED LIST DOCD IN RCRD: CPT | Mod: CPTII,S$GLB,, | Performed by: STUDENT IN AN ORGANIZED HEALTH CARE EDUCATION/TRAINING PROGRAM

## 2024-06-11 PROCEDURE — 4010F ACE/ARB THERAPY RXD/TAKEN: CPT | Mod: CPTII,S$GLB,, | Performed by: STUDENT IN AN ORGANIZED HEALTH CARE EDUCATION/TRAINING PROGRAM

## 2024-06-11 PROCEDURE — 99999 PR PBB SHADOW E&M-EST. PATIENT-LVL IV: CPT | Mod: PBBFAC,,, | Performed by: STUDENT IN AN ORGANIZED HEALTH CARE EDUCATION/TRAINING PROGRAM

## 2024-06-11 PROCEDURE — 99214 OFFICE O/P EST MOD 30 MIN: CPT | Mod: S$GLB,,, | Performed by: STUDENT IN AN ORGANIZED HEALTH CARE EDUCATION/TRAINING PROGRAM

## 2024-06-11 PROCEDURE — 3080F DIAST BP >= 90 MM HG: CPT | Mod: CPTII,S$GLB,, | Performed by: STUDENT IN AN ORGANIZED HEALTH CARE EDUCATION/TRAINING PROGRAM

## 2024-06-11 RX ORDER — BUDESONIDE AND FORMOTEROL FUMARATE DIHYDRATE 160; 4.5 UG/1; UG/1
2 AEROSOL RESPIRATORY (INHALATION) 2 TIMES DAILY
COMMUNITY
Start: 2024-06-07 | End: 2025-06-07

## 2024-06-11 RX ORDER — MONTELUKAST SODIUM 10 MG/1
10 TABLET ORAL NIGHTLY
COMMUNITY
Start: 2024-01-22

## 2024-06-27 ENCOUNTER — HOSPITAL ENCOUNTER (OUTPATIENT)
Dept: RADIOLOGY | Facility: HOSPITAL | Age: 44
Discharge: HOME | End: 2024-06-27
Attending: STUDENT IN AN ORGANIZED HEALTH CARE EDUCATION/TRAINING PROGRAM
Payer: COMMERCIAL

## 2024-06-27 DIAGNOSIS — R10.11 RUQ PAIN: ICD-10-CM

## 2024-06-27 PROCEDURE — 76705 ECHO EXAM OF ABDOMEN: CPT | Mod: TC

## 2024-06-27 PROCEDURE — 76705 ECHO EXAM OF ABDOMEN: CPT | Mod: 26,,, | Performed by: RADIOLOGY

## 2024-07-10 NOTE — PROGRESS NOTES
"Subjective:         Chief Complaint: Follow-up (1 month HTN), URI (Concerned about "whistling sound" stopped singular and did not  inhaler), skin issue (Dermatologist want to start pt on spirolactone), and fever blister (Appeared on Sunday-Rx)    HPI  Ms.Kristine Gonzalez is a 44-year-old woman with pertinent medical history including anxiety/depression (off of Lexapro 10 for a few months and Xanax 1 b.i.d. p.r.n.) & hypertension (losartan 25) presenting originally for hypertensive follow-up; during rooming, also requested acute evaluation for URI symptoms (recently evaluated via pulmonology and provided albuterol inhaler which he has not yet picked up), acne (has a dermatologist that has suggested initiating spironolactone), and a fever blister that appeared on Sunday (5 days ago).     Hypertension:   - persistently hypertensive on losartan 25 at last appointment during acute evaluation for abdominal pain  - asked her to acquire cuff, keep outpatient blood pressure log, and follow-up for consideration of pharmacotherapy titration  - home cuff: accurate w/I 5 mmHg    Cold sore:  - infrequent exacerbations  - no prior treatment trials    Whistling sound while breathing:  - status post formal evaluation via pulmonology; transitioned Breztri to Symbicort at last appointment (yet to initiate)  - Claritin was subjectively ineffective but she has not tried any other over-the-counter allergy medications    Review of Systems   Constitutional:  Negative for appetite change, chills and fever.   HENT: Negative.     Respiratory:  Negative for cough, chest tightness and shortness of breath.    Cardiovascular:  Negative for chest pain, palpitations and leg swelling.   Gastrointestinal:  Negative for abdominal distention, abdominal pain, blood in stool, constipation, diarrhea, nausea and vomiting.   Endocrine: Negative.    Genitourinary:  Negative for difficulty urinating, dysuria, frequency and hematuria.   Musculoskeletal: " Negative.    Integumentary:  Negative.   Neurological: Negative.    Psychiatric/Behavioral: Negative.           Objective:      Vitals:    07/11/24 0939   BP: (!) 162/100   Pulse:    Resp:    Temp:       Physical Exam  Current Outpatient Medications on File Prior to Visit   Medication Sig Dispense Refill    ALPRAZolam (XANAX) 1 MG tablet Take 1 tablet (1 mg total) by mouth 2 (two) times daily. (Patient taking differently: Take 1 mg by mouth 2 (two) times daily as needed.) 30 tablet 0    budesonide-glycopyr-formoterol (BREZTRI AEROSPHERE) 160-9-4.8 mcg/actuation HFAA Inhale 2 puffs into the lungs 2 (two) times a day 32.1 g 3    LINZESS 72 mcg Cap capsule Take 1 capsule (72 mcg total) by mouth before breakfast. 90 capsule 1    losartan (COZAAR) 25 MG tablet Take 1 tablet (25 mg total) by mouth once daily. 90 tablet 3    mometasone (ELOCON) 0.1 % ointment Apply twice a day to rash as needed 45 g 1    budesonide-formoterol 160-4.5 mcg (SYMBICORT) 160-4.5 mcg/actuation HFAA Inhale 2 puffs into the lungs 2 (two) times daily. (Patient not taking: Reported on 7/11/2024)      montelukast (SINGULAIR) 10 mg tablet Take 10 mg by mouth every evening. (Patient not taking: Reported on 7/11/2024)      traZODone (DESYREL) 100 MG tablet Take 1 tablet (100 mg total) by mouth every evening. (Patient not taking: Reported on 6/11/2024) 90 tablet 3     No current facility-administered medications on file prior to visit.         Assessment:       1. Primary hypertension    2. Acne, unspecified acne type    3. HSV-1 (herpes simplex virus 1) infection    4. Abnormal breath sounds        Plan:       Primary hypertension  Acne, unspecified acne type   - continue losartan at low dose (switch to q.h.s.) and add spironolactone 25 q.a.m. for combination antihypertensive and peripheral testosterone blocking effect   - follow-up in one-month for repeat hypertensive follow-up    HSV-1 (herpes simplex virus 1) infection   - Valtrex prescribed to be  used p.r.n.    Abnormal breath sounds   - encouraged to try over-the-counter Allegra and pickup recommended Symbicort    Other orders  -     spironolactone (ALDACTONE) 25 MG tablet; Take 1 tablet (25 mg total) by mouth once daily.  Dispense: 90 tablet; Refill: 3  -     valACYclovir (VALTREX) 1000 MG tablet; Initiate 1 tablet twice daily for 10 days at earliest sign of symptoms  Dispense: 40 tablet; Refill: 1

## 2024-07-11 ENCOUNTER — OFFICE VISIT (OUTPATIENT)
Dept: INTERNAL MEDICINE | Facility: CLINIC | Age: 44
End: 2024-07-11
Payer: COMMERCIAL

## 2024-07-11 VITALS
SYSTOLIC BLOOD PRESSURE: 162 MMHG | WEIGHT: 275.38 LBS | TEMPERATURE: 98 F | OXYGEN SATURATION: 97 % | DIASTOLIC BLOOD PRESSURE: 100 MMHG | BODY MASS INDEX: 48.79 KG/M2 | HEART RATE: 84 BPM | HEIGHT: 63 IN | RESPIRATION RATE: 18 BRPM

## 2024-07-11 DIAGNOSIS — I10 PRIMARY HYPERTENSION: Primary | ICD-10-CM

## 2024-07-11 DIAGNOSIS — L70.9 ACNE, UNSPECIFIED ACNE TYPE: ICD-10-CM

## 2024-07-11 DIAGNOSIS — R06.89 ABNORMAL BREATH SOUNDS: ICD-10-CM

## 2024-07-11 DIAGNOSIS — B00.9 HSV-1 (HERPES SIMPLEX VIRUS 1) INFECTION: ICD-10-CM

## 2024-07-11 PROCEDURE — 1159F MED LIST DOCD IN RCRD: CPT | Mod: CPTII,S$GLB,, | Performed by: STUDENT IN AN ORGANIZED HEALTH CARE EDUCATION/TRAINING PROGRAM

## 2024-07-11 PROCEDURE — G2211 COMPLEX E/M VISIT ADD ON: HCPCS | Mod: GZ,S$GLB,, | Performed by: STUDENT IN AN ORGANIZED HEALTH CARE EDUCATION/TRAINING PROGRAM

## 2024-07-11 PROCEDURE — 99214 OFFICE O/P EST MOD 30 MIN: CPT | Mod: S$GLB,,, | Performed by: STUDENT IN AN ORGANIZED HEALTH CARE EDUCATION/TRAINING PROGRAM

## 2024-07-11 PROCEDURE — 3077F SYST BP >= 140 MM HG: CPT | Mod: CPTII,S$GLB,, | Performed by: STUDENT IN AN ORGANIZED HEALTH CARE EDUCATION/TRAINING PROGRAM

## 2024-07-11 PROCEDURE — 4010F ACE/ARB THERAPY RXD/TAKEN: CPT | Mod: CPTII,S$GLB,, | Performed by: STUDENT IN AN ORGANIZED HEALTH CARE EDUCATION/TRAINING PROGRAM

## 2024-07-11 PROCEDURE — 99999 PR PBB SHADOW E&M-EST. PATIENT-LVL IV: CPT | Mod: PBBFAC,,, | Performed by: STUDENT IN AN ORGANIZED HEALTH CARE EDUCATION/TRAINING PROGRAM

## 2024-07-11 PROCEDURE — 3008F BODY MASS INDEX DOCD: CPT | Mod: CPTII,S$GLB,, | Performed by: STUDENT IN AN ORGANIZED HEALTH CARE EDUCATION/TRAINING PROGRAM

## 2024-07-11 PROCEDURE — 3080F DIAST BP >= 90 MM HG: CPT | Mod: CPTII,S$GLB,, | Performed by: STUDENT IN AN ORGANIZED HEALTH CARE EDUCATION/TRAINING PROGRAM

## 2024-07-11 RX ORDER — SPIRONOLACTONE 25 MG/1
25 TABLET ORAL DAILY
Qty: 90 TABLET | Refills: 3 | Status: SHIPPED | OUTPATIENT
Start: 2024-07-11 | End: 2025-07-11

## 2024-07-11 RX ORDER — VALACYCLOVIR HYDROCHLORIDE 1 G/1
TABLET, FILM COATED ORAL
Qty: 40 TABLET | Refills: 1 | Status: SHIPPED | OUTPATIENT
Start: 2024-07-11

## 2024-07-25 DIAGNOSIS — K59.00 CONSTIPATION, UNSPECIFIED CONSTIPATION TYPE: ICD-10-CM

## 2024-07-25 RX ORDER — LINACLOTIDE 72 UG/1
72 CAPSULE, GELATIN COATED ORAL
Qty: 90 CAPSULE | Refills: 1 | Status: SHIPPED | OUTPATIENT
Start: 2024-07-25

## 2024-07-25 NOTE — TELEPHONE ENCOUNTER
Refill Routing Note   Medication(s) are not appropriate for processing by [facility] for the following reason(s):        Medication outside of protocol    [facility] action(s):  Route               Appointments  past 12m or future 3m with PCP    Date Provider   Last Visit   6/5/2024 Nguyễn Barbosa MD   Next Visit   8/1/2024 Nguyễn Barbosa MD   ED visits in past 90 days: 0        Note composed:11:15 AM 07/25/2024

## 2024-07-28 NOTE — TELEPHONE ENCOUNTER
Pt forgot to ask fr refill at last visit   Lov-10/26/2023  She is requesting 90 day supply if possible .   Removed 20 gauge saline lock from LAC, leaks when flushed, catheter intact. Contreras patent to gravity, urine clear yellow. Pt and spouse report that he has been having flatus but no additional BM's at this time. Call light in reach. Electronically signed by Pattie Shirley RN on 7/28/2024 at 2:51 PM

## 2024-08-01 ENCOUNTER — OFFICE VISIT (OUTPATIENT)
Dept: OBSTETRICS AND GYNECOLOGY | Facility: CLINIC | Age: 44
End: 2024-08-01
Payer: COMMERCIAL

## 2024-08-01 VITALS — BODY MASS INDEX: 48.82 KG/M2 | WEIGHT: 275.56 LBS | HEIGHT: 63 IN

## 2024-08-01 DIAGNOSIS — F41.9 ANXIETY DISORDER, UNSPECIFIED TYPE: ICD-10-CM

## 2024-08-01 DIAGNOSIS — Z80.3 FAMILY HISTORY OF BREAST CANCER IN MOTHER: ICD-10-CM

## 2024-08-01 DIAGNOSIS — Z98.890 HISTORY OF LUMPECTOMY OF RIGHT BREAST: ICD-10-CM

## 2024-08-01 DIAGNOSIS — N60.01 CYST OF RIGHT BREAST: ICD-10-CM

## 2024-08-01 DIAGNOSIS — Z01.419 ENCOUNTER FOR GYNECOLOGICAL EXAMINATION: Primary | ICD-10-CM

## 2024-08-01 DIAGNOSIS — Z12.4 SCREENING FOR CERVICAL CANCER: ICD-10-CM

## 2024-08-01 DIAGNOSIS — Z11.51 SCREENING FOR HPV (HUMAN PAPILLOMAVIRUS): ICD-10-CM

## 2024-08-01 PROCEDURE — 4010F ACE/ARB THERAPY RXD/TAKEN: CPT | Mod: CPTII,S$GLB,, | Performed by: OBSTETRICS & GYNECOLOGY

## 2024-08-01 PROCEDURE — 1159F MED LIST DOCD IN RCRD: CPT | Mod: CPTII,S$GLB,, | Performed by: OBSTETRICS & GYNECOLOGY

## 2024-08-01 PROCEDURE — 88175 CYTOPATH C/V AUTO FLUID REDO: CPT | Performed by: PATHOLOGY

## 2024-08-01 PROCEDURE — 99999 PR PBB SHADOW E&M-EST. PATIENT-LVL III: CPT | Mod: PBBFAC,,, | Performed by: OBSTETRICS & GYNECOLOGY

## 2024-08-01 PROCEDURE — 87624 HPV HI-RISK TYP POOLED RSLT: CPT | Performed by: OBSTETRICS & GYNECOLOGY

## 2024-08-01 PROCEDURE — 3008F BODY MASS INDEX DOCD: CPT | Mod: CPTII,S$GLB,, | Performed by: OBSTETRICS & GYNECOLOGY

## 2024-08-01 PROCEDURE — 99396 PREV VISIT EST AGE 40-64: CPT | Mod: S$GLB,,, | Performed by: OBSTETRICS & GYNECOLOGY

## 2024-08-01 RX ORDER — ALPRAZOLAM 1 MG/1
1 TABLET ORAL 2 TIMES DAILY
Qty: 30 TABLET | Refills: 0 | Status: SHIPPED | OUTPATIENT
Start: 2024-08-01

## 2024-08-06 ENCOUNTER — PATIENT MESSAGE (OUTPATIENT)
Dept: OBSTETRICS AND GYNECOLOGY | Facility: CLINIC | Age: 44
End: 2024-08-06
Payer: COMMERCIAL

## 2024-08-07 LAB
FINAL PATHOLOGIC DIAGNOSIS: ABNORMAL
Lab: ABNORMAL

## 2024-08-07 NOTE — TELEPHONE ENCOUNTER
Called and left a message on her cell   Pap LGSIL HPV neg    Plan to schedule colpo - will try to call again tomorrow  Will offer to schedule colpo on 8/20

## 2024-08-16 ENCOUNTER — OFFICE VISIT (OUTPATIENT)
Dept: SURGERY | Facility: CLINIC | Age: 44
End: 2024-08-16
Payer: COMMERCIAL

## 2024-08-16 ENCOUNTER — TELEPHONE (OUTPATIENT)
Dept: HEMATOLOGY/ONCOLOGY | Facility: CLINIC | Age: 44
End: 2024-08-16
Payer: COMMERCIAL

## 2024-08-16 VITALS
WEIGHT: 275 LBS | DIASTOLIC BLOOD PRESSURE: 88 MMHG | SYSTOLIC BLOOD PRESSURE: 159 MMHG | HEART RATE: 85 BPM | HEIGHT: 63 IN | BODY MASS INDEX: 48.73 KG/M2

## 2024-08-16 DIAGNOSIS — Z98.890 HISTORY OF LUMPECTOMY OF RIGHT BREAST: ICD-10-CM

## 2024-08-16 DIAGNOSIS — F41.9 ANXIETY: ICD-10-CM

## 2024-08-16 DIAGNOSIS — Z80.3 FAMILY HISTORY OF BREAST CANCER IN MOTHER: ICD-10-CM

## 2024-08-16 DIAGNOSIS — Z91.89 AT HIGH RISK FOR BREAST CANCER: Primary | ICD-10-CM

## 2024-08-16 DIAGNOSIS — Z12.39 ENCOUNTER FOR BREAST CANCER SCREENING OTHER THAN MAMMOGRAM: ICD-10-CM

## 2024-08-16 DIAGNOSIS — N60.01 CYST OF RIGHT BREAST: ICD-10-CM

## 2024-08-16 PROCEDURE — 99999 PR PBB SHADOW E&M-EST. PATIENT-LVL IV: CPT | Mod: PBBFAC,,, | Performed by: NURSE PRACTITIONER

## 2024-08-16 RX ORDER — DIAZEPAM 2 MG/1
2 TABLET ORAL
Qty: 2 TABLET | Refills: 0 | Status: SHIPPED | OUTPATIENT
Start: 2024-08-16

## 2024-08-16 NOTE — Clinical Note
Hi   Can you schedule her for a breast MRI in October/November  Also needs a CBE with me in 1 year (w/o imaging     Thanks Yohana

## 2024-08-16 NOTE — PROGRESS NOTES
"[facility]  Department of Surgery      REFERRING PROVIDER:   Nguyễn Barbosa MD  [address]  [address]  [place] [address]    Chief Complaint: New Patient and Breast Cyst    2024    Subjective:      Patient ID: Rubina Gonzalez is a 44 y.o. female who presents with right breast changes. US showed 0.6 cm simple cyst 6oc 2 CFN of the right breast. Overall imaging was benign. She was told to see a breast specialist given her palpable findings. In 2017 underwent biopsy of the right breast which was FCC. Hx of excision of the right breast in 2019 with benign pathology     Patient does routinely do self breast exams.  Patient has noted a change on breast exam.  Patient denies nipple discharge. Patient admits to to previous breast biopsy. Patient denies a personal history of breast cancer.    GYN History:  Age of menarche was 13.   Premenopausal   Patient is .   Age of first live birth was 24.   Patient did not use hormone therapy   Patient did not breast feed.    Calculated TC core 21%    Family History:  Mom - BC diagnosed at 48; passed 56  Paternal grandmother - Breast Cancer  Paternal Aunt - Breast Cancer (passed at 56)  Maternal GF - Colon Cancer (diagnosed in his 60's)  Maternal Great-Grandmother - Colon Cancer    Past Medical History:   Diagnosis Date    Abnormal Pap smear of cervix 2015    ASCUS / Hpv Neg    COVID-19 virus infection 2020    Depression     Family history of breast cancer in mother     dx. age 48    Galactorrhea     History of kidney stones 2023    Hypertension     on Lisinopril    Insomnia     Irregular menstrual bleeding     Migraine     topomax    Panic attacks      Past Surgical History:   Procedure Laterality Date    BREAST BIOPSY Right 2017    Core Bx  (Path:  Fibrocystic Changes)    BREAST LUMPECTOMY W/ NEEDLE LOCALIZATION Right 2019    per  (Path Diag: "simple benign cyst" - f/u every 6 mos.    CHOLECYSTECTOMY      " CYSTOURETEROSCOPY,WITH HOLMIUM LASER LITHOTRIPSY OF URETERAL CALCULUS Left 8/30/2023    Procedure: CYSTOURETEROSCOPY,WITH HOLMIUM LASER LITHOTRIPSY OF URETERAL CALCULUS;  Surgeon: Luis Hussein MD;  Location: [facility] OR;  Service: Urology;  Laterality: Left;  1.5 hrs    ENDOMETRIAL ABLATION  03/28/2017    Dr Nguyễn Barbosa    URETERAL STENT PLACEMENT Left 8/30/2023    Procedure: INSERTION, STENT, URETER;  Surgeon: Luis Hussein MD;  Location: [facility] OR;  Service: Urology;  Laterality: Left;     Current Outpatient Medications on File Prior to Visit   Medication Sig Dispense Refill    ALPRAZolam (XANAX) 1 MG tablet Take 1 tablet (1 mg total) by mouth 2 (two) times daily. 30 tablet 0    budesonide-formoterol 160-4.5 mcg (SYMBICORT) 160-4.5 mcg/actuation HFAA Inhale 2 puffs into the lungs 2 (two) times daily.      budesonide-formoterol 160-4.5 mcg (SYMBICORT) 160-4.5 mcg/actuation HFAA Inhale 2 puffs into the lungs 2 (two) times daily 10.2 g 0    budesonide-glycopyr-formoterol (BREZTRI AEROSPHERE) 160-9-4.8 mcg/actuation HFAA Inhale 2 puffs into the lungs 2 (two) times a day 32.1 g 3    LINZESS 72 mcg Cap capsule Take 1 capsule (72 mcg total) by mouth before breakfast. 90 capsule 1    losartan (COZAAR) 25 MG tablet Take 1 tablet (25 mg total) by mouth once daily. 90 tablet 3    mometasone (ELOCON) 0.1 % ointment Apply twice a day to rash as needed 45 g 1    montelukast (SINGULAIR) 10 mg tablet Take 10 mg by mouth every evening.      spironolactone (ALDACTONE) 25 MG tablet Take 1 tablet (25 mg total) by mouth once daily. 90 tablet 3    traZODone (DESYREL) 100 MG tablet Take 1 tablet (100 mg total) by mouth every evening. 90 tablet 3    valACYclovir (VALTREX) 1000 MG tablet Take 1 tablet by mouth twice daily for 10 days at earliest sign of symptoms 40 tablet 1     No current facility-administered medications on file prior to visit.     Social History     Socioeconomic History    Marital status:    Tobacco  Use    Smoking status: Never    Smokeless tobacco: Never   Substance and Sexual Activity    Alcohol use: Yes     Comment: Social    Drug use: No    Sexual activity: Yes     Partners: Male     Birth control/protection: Partner-Vasectomy     Comment: :  Ha   Social History Narrative    Four girls, one accepted to [employer]     Social Determinants of Health     Financial Resource Strain: Low Risk  (6/11/2024)    Overall Financial Resource Strain (CARDIA)     Difficulty of Paying Living Expenses: Not hard at all   Food Insecurity: No Food Insecurity (6/11/2024)    Hunger Vital Sign     Worried About Running Out of Food in the Last Year: Never true     Ran Out of Food in the Last Year: Never true   Transportation Needs: No Transportation Needs (9/12/2023)    PRAPARE - Transportation     Lack of Transportation (Medical): No     Lack of Transportation (Non-Medical): No   Physical Activity: Inactive (6/11/2024)    Exercise Vital Sign     Days of Exercise per Week: 0 days     Minutes of Exercise per Session: 0 min   Stress: Stress Concern Present (6/11/2024)    [employer] [place] [employer] - Occupational Stress Questionnaire     Feeling of Stress : To some extent   Housing Stability: High Risk (9/12/2023)    Housing Stability Vital Sign     Unable to Pay for Housing in the Last Year: Yes     Number of Places Lived in the Last Year: 1     Unstable Housing in the Last Year: No     Family History   Problem Relation Name Age of Onset    Colon cancer Paternal Grandmother      Cancer Paternal Grandmother      Breast cancer Paternal Grandmother      Colon cancer Maternal Grandfather      Cancer Maternal Grandfather      Hypertension Father      Breast cancer Mother  57    Hypertension Mother      Cancer Mother      No Known Problems Brother      No Known Problems Brother      Depression Daughter      Depression Daughter      No Known Problems Daughter      No Known Problems Daughter      Breast cancer  "Paternal Aunt      Cancer Paternal Aunt      Ovarian cancer Neg Hx          Review of Systems   Constitutional:  Negative for chills, fatigue, fever and unexpected weight change.   Eyes:  Negative for visual disturbance.   Respiratory:  Negative for cough, shortness of breath and wheezing.    Cardiovascular:  Negative for chest pain and palpitations.   Musculoskeletal:  Negative for back pain.   Skin:  Negative for color change, pallor, rash and wound.     Objective:   BP (!) 159/88   Pulse 85   Ht 5' 3" (1.6 m)   Wt 124.7 kg (275 lb)   BMI 48.71 kg/m²     Physical Exam   Vitals reviewed.  Constitutional: She is oriented to person, place, and time.   Eyes: Right eye exhibits no discharge. Left eye exhibits no discharge.   Pulmonary/Chest: Effort normal. No respiratory distress. She has no wheezes. Right breast exhibits no inverted nipple, no mass, no nipple discharge, no skin change and no tenderness. Left breast exhibits no inverted nipple, no mass, no nipple discharge, no skin change and no tenderness.   Abdominal: Normal appearance.   Musculoskeletal: Normal range of motion. Lymphadenopathy:      Cervical: No cervical adenopathy.     Neurological: She is alert and oriented to person, place, and time.   Skin: Skin is warm and dry. No rash noted. No erythema. No pallor.         Radiology review: Images personally reviewed by me in the clinic.       Assessment:       1. At high risk for breast cancer    2. Cyst of right breast    3. History of lumpectomy of right breast (PATH: "benign simple cyst")    4. Family history of breast cancer in mother    5. Encounter for breast cancer screening other than mammogram    6. Anxiety        Plan:   We discussed there was nothing concerning on exam or imaging. Reassurance given.   Recalculated TC score today which is 21%  Genetic counselor referral   MRI in 6 months from mammkareem [facility]ondon anxiolytic   I will see her in 1 year for CBE    The patient is in agreement with the " plan. Questions were encouraged and answered to patient's satisfaction. Rubina will call our office with any questions or concerns.

## 2024-08-16 NOTE — TELEPHONE ENCOUNTER
----- Message from Yohana Richmond NP sent at 8/16/2024  4:37 PM CDT -----  Hi     Can you schedule her for a breast MRI in October/November   Also needs a CBE with me in 1 year (w/o imaging         Thanks  Yohana

## 2024-08-20 ENCOUNTER — LAB VISIT (OUTPATIENT)
Dept: LAB | Facility: HOSPITAL | Age: 44
End: 2024-08-20
Attending: OBSTETRICS & GYNECOLOGY
Payer: COMMERCIAL

## 2024-08-20 ENCOUNTER — PROCEDURE VISIT (OUTPATIENT)
Dept: OBSTETRICS AND GYNECOLOGY | Facility: CLINIC | Age: 44
End: 2024-08-20
Payer: COMMERCIAL

## 2024-08-20 VITALS — BODY MASS INDEX: 48.71 KG/M2 | HEIGHT: 63 IN

## 2024-08-20 DIAGNOSIS — F41.9 ANXIETY DISORDER, UNSPECIFIED TYPE: ICD-10-CM

## 2024-08-20 DIAGNOSIS — R87.612 LGSIL ON PAP SMEAR OF CERVIX: ICD-10-CM

## 2024-08-20 DIAGNOSIS — F41.9 ANXIETY DISORDER, UNSPECIFIED TYPE: Primary | ICD-10-CM

## 2024-08-20 DIAGNOSIS — N95.1 MENOPAUSAL SYMPTOMS: ICD-10-CM

## 2024-08-20 DIAGNOSIS — I10 PRIMARY HYPERTENSION: ICD-10-CM

## 2024-08-20 PROCEDURE — 84402 ASSAY OF FREE TESTOSTERONE: CPT | Performed by: OBSTETRICS & GYNECOLOGY

## 2024-08-20 PROCEDURE — 83001 ASSAY OF GONADOTROPIN (FSH): CPT | Performed by: OBSTETRICS & GYNECOLOGY

## 2024-08-20 PROCEDURE — 80053 COMPREHEN METABOLIC PANEL: CPT | Performed by: OBSTETRICS & GYNECOLOGY

## 2024-08-20 PROCEDURE — 36415 COLL VENOUS BLD VENIPUNCTURE: CPT | Performed by: OBSTETRICS & GYNECOLOGY

## 2024-08-20 PROCEDURE — 88305 TISSUE EXAM BY PATHOLOGIST: CPT | Performed by: PATHOLOGY

## 2024-08-20 PROCEDURE — 84443 ASSAY THYROID STIM HORMONE: CPT | Performed by: OBSTETRICS & GYNECOLOGY

## 2024-08-20 PROCEDURE — 84439 ASSAY OF FREE THYROXINE: CPT | Performed by: OBSTETRICS & GYNECOLOGY

## 2024-08-20 PROCEDURE — 57456 ENDOCERV CURETTAGE W/SCOPE: CPT | Mod: S$GLB,,, | Performed by: OBSTETRICS & GYNECOLOGY

## 2024-08-20 PROCEDURE — 85025 COMPLETE CBC W/AUTO DIFF WBC: CPT | Performed by: OBSTETRICS & GYNECOLOGY

## 2024-08-20 PROCEDURE — 82306 VITAMIN D 25 HYDROXY: CPT | Performed by: OBSTETRICS & GYNECOLOGY

## 2024-08-20 PROCEDURE — 84403 ASSAY OF TOTAL TESTOSTERONE: CPT | Performed by: OBSTETRICS & GYNECOLOGY

## 2024-08-20 PROCEDURE — 83036 HEMOGLOBIN GLYCOSYLATED A1C: CPT | Performed by: OBSTETRICS & GYNECOLOGY

## 2024-08-20 PROCEDURE — 82670 ASSAY OF TOTAL ESTRADIOL: CPT | Performed by: OBSTETRICS & GYNECOLOGY

## 2024-08-20 PROCEDURE — 86141 C-REACTIVE PROTEIN HS: CPT | Performed by: OBSTETRICS & GYNECOLOGY

## 2024-08-21 LAB
25(OH)D3+25(OH)D2 SERPL-MCNC: 34 NG/ML (ref 30–96)
ALBUMIN SERPL BCP-MCNC: 3.7 G/DL (ref 3.5–5.2)
ALP SERPL-CCNC: 82 U/L (ref 55–135)
ALT SERPL W/O P-5'-P-CCNC: 15 U/L (ref 10–44)
ANION GAP SERPL CALC-SCNC: 13 MMOL/L (ref 8–16)
AST SERPL-CCNC: 14 U/L (ref 10–40)
BASOPHILS # BLD AUTO: 0.08 K/UL (ref 0–0.2)
BASOPHILS NFR BLD: 0.8 % (ref 0–1.9)
BILIRUB SERPL-MCNC: 0.4 MG/DL (ref 0.1–1)
BUN SERPL-MCNC: 11 MG/DL (ref 6–20)
CALCIUM SERPL-MCNC: 10 MG/DL (ref 8.7–10.5)
CHLORIDE SERPL-SCNC: 104 MMOL/L (ref 95–110)
CO2 SERPL-SCNC: 21 MMOL/L (ref 23–29)
CREAT SERPL-MCNC: 0.8 MG/DL (ref 0.5–1.4)
CRP SERPL-MCNC: 24.48 MG/L (ref 0–3.19)
DIFFERENTIAL METHOD BLD: ABNORMAL
EOSINOPHIL # BLD AUTO: 0.4 K/UL (ref 0–0.5)
EOSINOPHIL NFR BLD: 3.4 % (ref 0–8)
ERYTHROCYTE [DISTWIDTH] IN BLOOD BY AUTOMATED COUNT: 13.9 % (ref 11.5–14.5)
EST. GFR  (NO RACE VARIABLE): >60 ML/MIN/1.73 M^2
ESTIMATED AVG GLUCOSE: 111 MG/DL (ref 68–131)
ESTRADIOL SERPL-MCNC: 81 PG/ML
FSH SERPL-ACNC: 4.44 MIU/ML
GLUCOSE SERPL-MCNC: 95 MG/DL (ref 70–110)
HBA1C MFR BLD: 5.5 % (ref 4–5.6)
HCT VFR BLD AUTO: 41.5 % (ref 37–48.5)
HGB BLD-MCNC: 13.3 G/DL (ref 12–16)
IMM GRANULOCYTES # BLD AUTO: 0.02 K/UL (ref 0–0.04)
IMM GRANULOCYTES NFR BLD AUTO: 0.2 % (ref 0–0.5)
LYMPHOCYTES # BLD AUTO: 1.9 K/UL (ref 1–4.8)
LYMPHOCYTES NFR BLD: 17.8 % (ref 18–48)
MCH RBC QN AUTO: 29.8 PG (ref 27–31)
MCHC RBC AUTO-ENTMCNC: 32 G/DL (ref 32–36)
MCV RBC AUTO: 93 FL (ref 82–98)
MONOCYTES # BLD AUTO: 0.7 K/UL (ref 0.3–1)
MONOCYTES NFR BLD: 6.9 % (ref 4–15)
NEUTROPHILS # BLD AUTO: 7.5 K/UL (ref 1.8–7.7)
NEUTROPHILS NFR BLD: 70.9 % (ref 38–73)
NRBC BLD-RTO: 0 /100 WBC
PLATELET # BLD AUTO: 309 K/UL (ref 150–450)
PMV BLD AUTO: 11.6 FL (ref 9.2–12.9)
POTASSIUM SERPL-SCNC: 4.3 MMOL/L (ref 3.5–5.1)
PROT SERPL-MCNC: 7.2 G/DL (ref 6–8.4)
RBC # BLD AUTO: 4.47 M/UL (ref 4–5.4)
SODIUM SERPL-SCNC: 138 MMOL/L (ref 136–145)
T4 FREE SERPL-MCNC: 0.82 NG/DL (ref 0.71–1.51)
TESTOST SERPL-MCNC: 30 NG/DL (ref 5–73)
TSH SERPL DL<=0.005 MIU/L-ACNC: 1.35 UIU/ML (ref 0.4–4)
WBC # BLD AUTO: 10.6 K/UL (ref 3.9–12.7)

## 2024-08-23 LAB
FINAL PATHOLOGIC DIAGNOSIS: NORMAL
GROSS: NORMAL
Lab: NORMAL
TESTOST FREE SERPL-MCNC: 1.2 PG/ML

## 2024-08-26 NOTE — PROGRESS NOTES
Spoke to pt   Labs look good but CRP elevated prob from chronic inflammation  Has appt with John   Pt to focus on causes of chronic inflammation which can lead to fatigue and joint pain   Kesha focusing on Med diet    Will f/u in 6 mo with pap and also can discuss test cream further but for now will focus on overall health

## 2024-09-18 ENCOUNTER — PATIENT MESSAGE (OUTPATIENT)
Dept: OBSTETRICS AND GYNECOLOGY | Facility: CLINIC | Age: 44
End: 2024-09-18
Payer: COMMERCIAL

## 2024-09-18 DIAGNOSIS — R39.89 SUSPECTED UTI: Primary | ICD-10-CM

## 2024-10-02 ENCOUNTER — PATIENT MESSAGE (OUTPATIENT)
Dept: OBSTETRICS AND GYNECOLOGY | Facility: CLINIC | Age: 44
End: 2024-10-02
Payer: COMMERCIAL

## 2024-10-03 ENCOUNTER — OFFICE VISIT (OUTPATIENT)
Dept: OBSTETRICS AND GYNECOLOGY | Facility: CLINIC | Age: 44
End: 2024-10-03
Payer: COMMERCIAL

## 2024-10-03 VITALS
SYSTOLIC BLOOD PRESSURE: 130 MMHG | DIASTOLIC BLOOD PRESSURE: 80 MMHG | WEIGHT: 272.94 LBS | BODY MASS INDEX: 48.36 KG/M2 | HEIGHT: 63 IN

## 2024-10-03 DIAGNOSIS — N89.8 VAGINA ITCHING: Primary | ICD-10-CM

## 2024-10-03 DIAGNOSIS — N89.8 VAGINAL DISCHARGE: ICD-10-CM

## 2024-10-03 PROCEDURE — 0352U VAGINOSIS SCREEN BY DNA PROBE: CPT | Performed by: OBSTETRICS & GYNECOLOGY

## 2024-10-03 PROCEDURE — 99999 PR PBB SHADOW E&M-EST. PATIENT-LVL II: CPT | Mod: PBBFAC,,, | Performed by: OBSTETRICS & GYNECOLOGY

## 2024-10-03 RX ORDER — FLUCONAZOLE 150 MG/1
150 TABLET ORAL EVERY OTHER DAY
Qty: 3 TABLET | Refills: 0 | Status: SHIPPED | OUTPATIENT
Start: 2024-10-03 | End: 2024-10-08

## 2024-10-03 RX ORDER — CLOTRIMAZOLE AND BETAMETHASONE DIPROPIONATE 10; .64 MG/G; MG/G
CREAM TOPICAL 2 TIMES DAILY
Qty: 15 G | Refills: 1 | Status: SHIPPED | OUTPATIENT
Start: 2024-10-03

## 2024-10-03 NOTE — PROGRESS NOTES
"Subjective:       Patient ID: Rubina Gonzalez is a 44 y.o. female.    Chief Complaint:  Vaginal Discharge and Vulvar Itch  "I am having severe itching in the vaginal area. It's very red and inflamed. "    History of Present Illness  45 y/o with a 3 day day history of vaginal itching associated with increased redness of the vulva.  Patient reports a slight white discharge.  No significant odor      GYN & OB History  No LMP recorded. Patient has had an ablation.   Date of Last Pap: 8/7/2024       Objective:     Vitals:    10/03/24 0851   BP: 130/80       Physical Exam:   Constitutional: She appears well-developed and well-nourished.               Genitourinary:    Pelvic exam was performed with patient supine.   The external female genitalia was normal.   There is vaginal discharge in the vagina.    Genitourinary Comments: White clumpy discharge externally with thin yellow discharge internally   Mild redness on external vulva                          Assessment/ Plan:     Orders Placed This Encounter    fluconazole (DIFLUCAN) 150 MG Tab    clotrimazole-betamethasone 1-0.05% (LOTRISONE) cream       Rubina Dennis" was seen today for vaginal discharge and vulvar itch.    Diagnoses and all orders for this visit:    Vagina itching  -     fluconazole (DIFLUCAN) 150 MG Tab; Take 1 tablet (150 mg total) by mouth every other day. for 3 doses  -     clotrimazole-betamethasone 1-0.05% (LOTRISONE) cream; Apply topically 2 (two) times daily.    Vaginal discharge  -     fluconazole (DIFLUCAN) 150 MG Tab; Take 1 tablet (150 mg total) by mouth every other day. for 3 doses  -     clotrimazole-betamethasone 1-0.05% (LOTRISONE) cream; Apply topically 2 (two) times daily.    Affirm sent         Health Maintenance         Date Due Completion Date    TETANUS VACCINE Never done ---    Influenza Vaccine (1) Never done ---    COVID-19 Vaccine (1 - 2024-25 season) Never done ---    Mammogram 05/13/2025 5/13/2024    Hemoglobin A1c (Diabetic " Prevention Screening) 2027    Cervical Cancer Screening 2029    RSV Vaccine (Age 60+ and Pregnant patients) (1 - 1-dose 75+ series) 2056 ---            Answers submitted by the patient for this visit:  Gynecologic Exam Questionnaire  (Submitted on 10/3/2024)  Chief Complaint: Gynecologic exam  genital itching: Yes  genital odor: Yes  vaginal discharge: Yes  Chronicity: new  Onset: in the past 7 days  Frequency: constantly  Progression since onset: gradually worsening  Pain severity: mild  Affected side: both  Pregnant now?: No  abdominal pain: No  anorexia: No  back pain: No  chills: No  constipation: No  diarrhea: No  discolored urine: No  dysuria: Yes  fever: No  flank pain: No  frequency: Yes  headaches: No  hematuria: No  nausea: No  painful intercourse: No  rash: No  urgency: No  vomiting: No  Please select the characteristics of your discharge: : white  Vaginal bleeding: no bleeding  Passing clots?: No  Passing tissue?: No  Aggravated by: activity, urinating  treatments tried: nothing  Improvement on treatment: no relief  Sexual activity: sexually active  Partner with STD symptoms: no  Birth control: vasectomy  Menstrual history: irregular  STD: No  abdominal surgery: No   section: No  Ectopic pregnancy: No  Endometriosis: No  herpes simplex: No  gynecological surgery: Yes  menorrhagia: No  metrorrhagia: No  miscarriage: No  ovarian cysts: Yes  perineal abscess: No  PID: No  terminated pregnancy: No  vaginosis: No

## 2024-10-05 LAB
BACTERIAL VAGINOSIS DNA: NEGATIVE
CANDIDA GLABRATA/KRUSEI: NOT DETECTED
CANDIDA RRNA VAG QL PROBE: DETECTED
TRICHOMONAS VAGINALIS: NOT DETECTED

## 2024-10-25 ENCOUNTER — PATIENT OUTREACH (OUTPATIENT)
Dept: ADMINISTRATIVE | Facility: HOSPITAL | Age: 44
End: 2024-10-25
Payer: COMMERCIAL

## 2024-10-25 VITALS — DIASTOLIC BLOOD PRESSURE: 80 MMHG | SYSTOLIC BLOOD PRESSURE: 130 MMHG

## 2024-11-03 ENCOUNTER — ON-DEMAND VIRTUAL (OUTPATIENT)
Dept: URGENT CARE | Facility: CLINIC | Age: 44
End: 2024-11-03
Payer: COMMERCIAL

## 2024-11-03 DIAGNOSIS — R39.9 UTI SYMPTOMS: Primary | ICD-10-CM

## 2024-11-03 PROCEDURE — 99213 OFFICE O/P EST LOW 20 MIN: CPT | Mod: 95,,, | Performed by: NURSE PRACTITIONER

## 2024-11-03 RX ORDER — SULFAMETHOXAZOLE AND TRIMETHOPRIM 800; 160 MG/1; MG/1
1 TABLET ORAL 2 TIMES DAILY
Qty: 6 TABLET | Refills: 0 | Status: SHIPPED | OUTPATIENT
Start: 2024-11-03 | End: 2024-11-06

## 2024-11-03 NOTE — PROGRESS NOTES
"  Subjective:      Patient ID: Rubina Gonzalez is a 44 y.o. female.    Vitals:  vitals were not taken for this visit.     Chief Complaint: Dysuria      Visit Type: TELE AUDIOVISUAL - This visit was conducted virtually based on  subjective information and limited objective exam    Present with the patient at the time of consultation: TELEMED PRESENT WITH PATIENT: None  Two patient identifiers used to verify patient- saying out date of birth and full name.       Past Medical History:   Diagnosis Date    Abnormal Pap smear of cervix 11/12/2015    ASCUS / Hpv Neg    COVID-19 virus infection 07/2020    Depression     Family history of breast cancer in mother     dx. age 48    Galactorrhea     History of kidney stones 03/2023    Hypertension     on Lisinopril    Insomnia     Irregular menstrual bleeding     Migraine     topomax    Panic attacks      Past Surgical History:   Procedure Laterality Date    BREAST BIOPSY Right 03/24/2017    Core Bx  (Path:  Fibrocystic Changes)    BREAST LUMPECTOMY W/ NEEDLE LOCALIZATION Right 11/2019    per  (Path Diag: "simple benign cyst" - f/u every 6 mos.    CHOLECYSTECTOMY  2006    CYSTOURETEROSCOPY,WITH HOLMIUM LASER LITHOTRIPSY OF URETERAL CALCULUS Left 8/30/2023    Procedure: CYSTOURETEROSCOPY,WITH HOLMIUM LASER LITHOTRIPSY OF URETERAL CALCULUS;  Surgeon: Luis Hussein MD;  [facility];  Service: Urology;  Laterality: Left;  1.5 hrs    ENDOMETRIAL ABLATION  03/28/2017    Dr Adrian Bone    URETERAL STENT PLACEMENT Left 8/30/2023    Procedure: INSERTION, STENT, URETER;  Surgeon: Luis Hussein MD;  Location: [facility] OR;  Service: Urology;  Laterality: Left;     Review of patient's allergies indicates:   Allergen Reactions    Ciprofloxacin Hives     Other reaction(s): constipation & face swelling  Other reaction(s): constipation & face swelling    Cephalexin Rash     Current Outpatient Medications on File Prior to Visit   Medication Sig Dispense Refill    " ALPRAZolam (XANAX) 1 MG tablet Take 1 tablet (1 mg total) by mouth 2 (two) times daily. 30 tablet 0    amoxicillin-clavulanate 875-125mg (AUGMENTIN) 875-125 mg per tablet Take 1 tablet by mouth 2 (two) times a day. 14 tablet 0    budesonide-formoterol 160-4.5 mcg (SYMBICORT) 160-4.5 mcg/actuation HFAA Inhale 2 puffs into the lungs 2 (two) times daily.      budesonide-formoterol 160-4.5 mcg (SYMBICORT) 160-4.5 mcg/actuation HFAA Inhale 2 puffs into the lungs 2 (two) times daily 10.2 g 0    budesonide-glycopyr-formoterol (BREZTRI AEROSPHERE) 160-9-4.8 mcg/actuation HFAA Inhale 2 puffs into the lungs 2 (two) times a day 32.1 g 3    cefdinir (OMNICEF) 300 MG capsule Take 1 capsule (300 mg total) by mouth 2 (two) times a day. 14 capsule 0    clotrimazole-betamethasone 1-0.05% (LOTRISONE) cream Apply topically 2 (two) times daily. 15 g 1    diazePAM (VALIUM) 2 MG tablet Take 1 tablet (2 mg total) by mouth On call Procedure for Anxiety. 2 tablet 0    LINZESS 72 mcg Cap capsule Take 1 capsule (72 mcg total) by mouth before breakfast. 90 capsule 1    losartan (COZAAR) 25 MG tablet Take 1 tablet (25 mg total) by mouth once daily. 90 tablet 3    mometasone (ELOCON) 0.1 % ointment Apply twice a day to rash as needed 45 g 1    montelukast (SINGULAIR) 10 mg tablet Take 10 mg by mouth every evening.      predniSONE (DELTASONE) 10 MG tablet Take 3 tablets (30mg total) by mouth once daily on Days 1-2, THEN 2 tablets (20mg total) daily on days 3-4, THEN 1 tablet (10mg total) daily on days 5-6 12 tablet 0    predniSONE (DELTASONE) 20 MG tablet Take 1 tablet (20 mg total) by mouth once daily. 5 tablet 0    promethazine-codeine 6.25-10 mg/5 ml (PHENERGAN WITH CODEINE) 6.25-10 mg/5 mL syrup Take 5-10 mLs by mouth 3 (three) times daily as needed FOR COUGH 180 mL 0    spironolactone (ALDACTONE) 25 MG tablet Take 1 tablet (25 mg total) by mouth once daily. 90 tablet 3    traZODone (DESYREL) 100 MG tablet Take 1 tablet (100 mg total) by  mouth every evening. 90 tablet 3    valACYclovir (VALTREX) 1000 MG tablet Take 1 tablet by mouth twice daily for 10 days at earliest sign of symptoms 40 tablet 1     No current facility-administered medications on file prior to visit.     Family History   Problem Relation Name Age of Onset    Colon cancer Paternal Grandmother      Cancer Paternal Grandmother      Breast cancer Paternal Grandmother      Colon cancer Maternal Grandfather      Cancer Maternal Grandfather      Hypertension Father      Breast cancer Mother  57    Hypertension Mother      Cancer Mother      No Known Problems Brother      No Known Problems Brother      Depression Daughter      Depression Daughter      No Known Problems Daughter      No Known Problems Daughter      Breast cancer Paternal Aunt      Cancer Paternal Aunt      Ovarian cancer Neg Hx         Medications Ordered                [id number] - [place] - [address] AT [facility] [place]   [address] [address]    Telephone: [phone]   Fax: [phone]   Hours: Not open 24 hours                         E-Prescribed (1 of 1)              sulfamethoxazole-trimethoprim 800-160mg (BACTRIM DS) 800-160 mg Tab    Sig: Take 1 tablet by mouth 2 (two) times daily. for 3 days       Start: 11/3/24     Quantity: 6 tablet Refills: 0                           Ohs Peq Odvv Intake    11/3/2024 11:51 AM CST - Filed by Patient   What is your current physical address in the event of a medical emergency? [address] [place] [address]   Are you able to take your vital signs? Yes   Systolic Blood Pressure: 146   Diastolic Blood Pressure: 101   Weight: 275   Height: 64   Pulse: 86   Temperature: 97.7   Respiration rate:    Pulse Oxygen:    Please attach any relevant images or files    Is your employer contracted with [facility]? No         45 yo female with c/o uti symptoms. She states symptoms started several days ago  and she has taken azo. She states states lower abdominal pain no back pain. She denies hematuria. She denies std. She denies pregnancy. She denies fever        Constitution: Negative. Negative for fever.   HENT: Negative.     Neck: neck negative.   Cardiovascular: Negative.    Respiratory: Negative.     Gastrointestinal: Negative.  Negative for abdominal pain, nausea and vomiting.   Endocrine: negative.   Genitourinary:  Positive for dysuria, frequency and urgency. Negative for vaginal discharge, vaginal odor and genital sore.   Musculoskeletal: Negative.  Negative for back pain.   Skin: Negative.    Neurological: Negative.         Objective:   The physical exam was conducted virtually.  LOCATION OF PATIENT ratna truong x 3 ; no acute distress noted; appearance normal; mood and behavior normal; thought process normal  Head- normocephalic  Nose- appears normal, no discharge or erythema  Eyes- pupils appear normal in size, no drainage, no erythema  Ears- normal appearing; no discharge, no erythema  Mouth- appears normal  Oropharynx- no erythema, lesions  Lungs- breathing at a normal rate, no acute distress noted  Heart- no reports of tachycardia, palpitations, chest pain  Abdomen- non distended, non tender reported by patient  Skin- warm and dry, no erythema or edema noted by patient or visualized  Psych- as above; no si/hi      Assessment:     1. UTI symptoms        Plan:     PLEASE READ YOUR DISCHARGE INSTRUCTIONS ENTIRELY AS IT CONTAINS IMPORTANT INFORMATION.      Take the antibiotics to completion.     Drink plenty of fluids, wipe front to back, take showers not baths, no scented soaps, wear breathable cotton underwear, urinate after sexual intercourse.     Please go to the ER for worsening symptoms including fever, worsening flank pain, vomiting, etc.       Please return or see your primary care doctor if you develop new or worsening symptoms.     Please arrange follow up with your primary medical clinic as  soon as possible. You must understand that you've received an Urgent Care treatment only and that you may be released before all of your medical problems are known or treated. You, the patient, will arrange for follow up as instructed. If your symptoms worsen or fail to improve you should go to the Emergency Room.  WE CANNOT RULE OUT ALL POSSIBLE CAUSES OF YOUR SYMPTOMS IN THE URGENT CARE SETTING PLEASE GO TO THE ER IF YOU FEELS YOUR CONDITION IS WORSENING OR YOU WOULD LIKE EMERGENT EVALUATION.      Thank you for choosing [facility]!    Our goal in the [facility] is to always provide outstanding medical care. You may receive a survey by mail or e-mail in the next week regarding your experience today. We would greatly appreciate you completing and returning the survey. Your feedback provides us with a way to recognize our staff who provide very good care, and it helps us learn how to improve when your experience was below our aspiration of excellence.         We appreciate you trusting us with your medical care. We hope you feel better soon. We will be happy to take care of you for all of your future medical needs.    You must understand that you've received an Urgent Care treatment only and that you may be released before all your medical problems are known or treated. You, the patient, will arrange for follow up care as instructed.    Follow up with your PCP or specialty clinic as directed in the next 1-2 weeks if not improved or as needed.  You can call [phone] to schedule an appointment with the appropriate provider.    If your condition worsens we recommend that you receive another evaluation in person, with your primary care provider, urgent care or at the emergency room immediately or contact your primary medical clinics after hours call service to discuss your concerns.         UTI symptoms  -     sulfamethoxazole-trimethoprim 800-160mg (BACTRIM DS) 800-160 mg Tab;  Take 1 tablet by mouth 2 (two) times daily. for 3 days  Dispense: 6 tablet; Refill: 0

## 2024-11-05 ENCOUNTER — OFFICE VISIT (OUTPATIENT)
Dept: UROLOGY | Facility: CLINIC | Age: 44
End: 2024-11-05
Payer: COMMERCIAL

## 2024-11-05 ENCOUNTER — PATIENT MESSAGE (OUTPATIENT)
Dept: SURGERY | Facility: CLINIC | Age: 44
End: 2024-11-05
Payer: COMMERCIAL

## 2024-11-05 VITALS
HEART RATE: 98 BPM | SYSTOLIC BLOOD PRESSURE: 147 MMHG | DIASTOLIC BLOOD PRESSURE: 99 MMHG | WEIGHT: 276.69 LBS | BODY MASS INDEX: 49.01 KG/M2

## 2024-11-05 DIAGNOSIS — R30.0 DYSURIA: ICD-10-CM

## 2024-11-05 DIAGNOSIS — N20.0 CALCULUS OF KIDNEY: Primary | ICD-10-CM

## 2024-11-05 PROCEDURE — 87186 SC STD MICRODIL/AGAR DIL: CPT | Performed by: UROLOGY

## 2024-11-05 PROCEDURE — 99999 PR PBB SHADOW E&M-EST. PATIENT-LVL IV: CPT | Mod: PBBFAC,,, | Performed by: UROLOGY

## 2024-11-05 PROCEDURE — 87086 URINE CULTURE/COLONY COUNT: CPT | Performed by: UROLOGY

## 2024-11-05 PROCEDURE — 4010F ACE/ARB THERAPY RXD/TAKEN: CPT | Mod: CPTII,S$GLB,, | Performed by: UROLOGY

## 2024-11-05 PROCEDURE — 3044F HG A1C LEVEL LT 7.0%: CPT | Mod: CPTII,S$GLB,, | Performed by: UROLOGY

## 2024-11-05 PROCEDURE — 3077F SYST BP >= 140 MM HG: CPT | Mod: CPTII,S$GLB,, | Performed by: UROLOGY

## 2024-11-05 PROCEDURE — 3080F DIAST BP >= 90 MM HG: CPT | Mod: CPTII,S$GLB,, | Performed by: UROLOGY

## 2024-11-05 PROCEDURE — 1159F MED LIST DOCD IN RCRD: CPT | Mod: CPTII,S$GLB,, | Performed by: UROLOGY

## 2024-11-05 PROCEDURE — 3008F BODY MASS INDEX DOCD: CPT | Mod: CPTII,S$GLB,, | Performed by: UROLOGY

## 2024-11-05 PROCEDURE — 99214 OFFICE O/P EST MOD 30 MIN: CPT | Mod: S$GLB,,, | Performed by: UROLOGY

## 2024-11-05 PROCEDURE — 87088 URINE BACTERIA CULTURE: CPT | Performed by: UROLOGY

## 2024-11-05 NOTE — PROGRESS NOTES
[facility]      New Recurrent Urinary Tract Infections Note    11/5/2024    Referred by:  No ref. provider found    History of Present Illness    CHIEF COMPLAINT:  Ms. Gonzalez presents today for urinary symptoms suggestive of a urinary tract infection.    URINARY TRACT INFECTION:  She reports symptoms consistent with a UTI that began on Friday. She experiences pain during urination, as well as pressure and urgency to urinate, often with minimal output. She had a virtual visit on Sunday and was prescribed a 3-day course of Bactrim. She has been taking the medication as prescribed, with one dose on Sunday night, two doses on Monday, and one dose on Tuesday morning. She notes that while symptoms have not worsened, they have also not improved since starting the antibiotic treatment. She expresses uncertainty about the effectiveness of the short course, as she has not previously been prescribed such a brief antibiotic regimen for UTIs.    MEDICAL HISTORY:  She has a history of urolithiasis. She reports recent tonsillitis for which she completed two rounds of different antibiotics.    LABS:  Today's urinalysis shows trace blood, trace leukocyte esterase, and positive nitrates. I obtained a catheterized specimen for urine culture today.       ROS:  Genitourinary: reports frequency, reports urgency, reports painful urination          A review of 10+ systems was conducted with pertinent positive and negative findings documented in HPI with all other systems reviewed and negative.    Past medical, family, surgical and social history reviewed as documented in chart with pertinent positive medical, family, surgical and social history detailed in HPI.    Exam Findings:    Physical Exam    General: No acute distress. Nontoxic appearing.  HENT: Normocephalic. Atraumatic.  Respiratory: Normal respiratory effort. No conversational dyspnea. No audible wheezing.  Abdomen: No obvious distension.  Skin: No visible  abnormalities.  Extremities: No edema upper extremities. No edema lower extremities.  Neurological: Alert and oriented x3. Normal speech.  Psychiatric: Normal mood. Normal affect. No evidence of SI.  Female Genitourinary: Female genitourinary system appears normal.          Assessment/Plan:    Assessment & Plan    - Suspected UTI based on patient's symptoms, considering possibility of kidney stone due to history of urolithiasis  - Will obtain catheterized urine specimen for culture to determine appropriate treatment and rule out resistant bacteria  - Ordered renal ultrasound to check for kidney swelling, which could indicate an obstructing stone  - Opted to wait for culture results before prescribing additional antibiotics  - Considered possibility that recent antibiotic use for tonsillitis may have increased susceptibility to UTI    URINARY TRACT INFECTION (UTI):  - Explained that recent antibiotic use for tonsillitis may have increased susceptibility to UTI by disrupting normal healthy bacteria.  - Discussed that 3-day course of Bactrim is appropriate for UTI treatment.  - Explained rationale for obtaining urine culture in cases where symptoms persist despite initial antibiotic treatment.  - Continued Bactrim as prescribed (3-day course).  - Catheterized urine specimen for culture ordered.    RENAL EVALUATION:  - Renal ultrasound ordered.    FOLLOW UP:  - Will message patient with results of urine culture and ultrasound.  - Contact the office if symptoms worsen or do not improve.

## 2024-11-06 ENCOUNTER — HOSPITAL ENCOUNTER (OUTPATIENT)
Dept: RADIOLOGY | Facility: HOSPITAL | Age: 44
Discharge: HOME | End: 2024-11-06
Attending: NURSE PRACTITIONER
Payer: COMMERCIAL

## 2024-11-06 ENCOUNTER — HOSPITAL ENCOUNTER (OUTPATIENT)
Dept: RADIOLOGY | Facility: HOSPITAL | Age: 44
Discharge: HOME | End: 2024-11-06
Attending: UROLOGY
Payer: COMMERCIAL

## 2024-11-06 DIAGNOSIS — Z91.89 AT HIGH RISK FOR BREAST CANCER: ICD-10-CM

## 2024-11-06 DIAGNOSIS — N20.0 CALCULUS OF KIDNEY: ICD-10-CM

## 2024-11-06 DIAGNOSIS — Z12.39 ENCOUNTER FOR BREAST CANCER SCREENING OTHER THAN MAMMOGRAM: ICD-10-CM

## 2024-11-06 PROCEDURE — 76770 US EXAM ABDO BACK WALL COMP: CPT | Mod: 26,,, | Performed by: STUDENT IN AN ORGANIZED HEALTH CARE EDUCATION/TRAINING PROGRAM

## 2024-11-06 PROCEDURE — 25500020 PHARM REV CODE 255: Performed by: NURSE PRACTITIONER

## 2024-11-06 PROCEDURE — 77049 MRI BREAST C-+ W/CAD BI: CPT | Mod: TC

## 2024-11-06 PROCEDURE — A9577 INJ MULTIHANCE: HCPCS | Performed by: NURSE PRACTITIONER

## 2024-11-06 PROCEDURE — 76770 US EXAM ABDO BACK WALL COMP: CPT | Mod: TC

## 2024-11-06 PROCEDURE — 77049 MRI BREAST C-+ W/CAD BI: CPT | Mod: 26,,, | Performed by: RADIOLOGY

## 2024-11-06 RX ADMIN — GADOBENATE DIMEGLUMINE 20 ML: 529 INJECTION, SOLUTION INTRAVENOUS at 03:11

## 2024-11-07 ENCOUNTER — PATIENT MESSAGE (OUTPATIENT)
Dept: INTERNAL MEDICINE | Facility: CLINIC | Age: 44
End: 2024-11-07
Payer: COMMERCIAL

## 2024-11-07 ENCOUNTER — PATIENT MESSAGE (OUTPATIENT)
Dept: UROLOGY | Facility: CLINIC | Age: 44
End: 2024-11-07
Payer: COMMERCIAL

## 2024-11-07 DIAGNOSIS — R10.9 ABDOMINAL PAIN, UNSPECIFIED ABDOMINAL LOCATION: Primary | ICD-10-CM

## 2024-11-07 NOTE — TELEPHONE ENCOUNTER
LOV 7-11-24  Upcoming appt 11-27-24    I spoke to pt, she have met her deductible and is requesting order for Echo    Please advise  Thanks

## 2024-11-08 ENCOUNTER — PATIENT MESSAGE (OUTPATIENT)
Dept: UROLOGY | Facility: CLINIC | Age: 44
End: 2024-11-08
Payer: COMMERCIAL

## 2024-11-08 LAB — BACTERIA UR CULT: ABNORMAL

## 2024-11-08 RX ORDER — NITROFURANTOIN 25; 75 MG/1; MG/1
100 CAPSULE ORAL 2 TIMES DAILY
Qty: 10 CAPSULE | Refills: 0 | Status: SHIPPED | OUTPATIENT
Start: 2024-11-08 | End: 2024-11-13

## 2024-11-08 NOTE — TELEPHONE ENCOUNTER
Response from Dr. Lopez:  I can not recall the specifics of our prior conversations; did I ask her to do an echo?  If so, for what symptom (need to assigned a symptom that will get it covered by her insurance).       I tried calling pt to discuss the above response from Dr. Lopez  No answer. Left VM for pt to call the clinic

## 2024-11-12 ENCOUNTER — HOSPITAL ENCOUNTER (OUTPATIENT)
Dept: CARDIOLOGY | Facility: HOSPITAL | Age: 44
Discharge: HOME | End: 2024-11-12
Attending: STUDENT IN AN ORGANIZED HEALTH CARE EDUCATION/TRAINING PROGRAM
Payer: COMMERCIAL

## 2024-11-12 VITALS
HEART RATE: 79 BPM | BODY MASS INDEX: 48.9 KG/M2 | HEIGHT: 63 IN | SYSTOLIC BLOOD PRESSURE: 160 MMHG | DIASTOLIC BLOOD PRESSURE: 80 MMHG | WEIGHT: 276 LBS

## 2024-11-12 DIAGNOSIS — R93.1 ABNORMAL ECHOCARDIOGRAM: ICD-10-CM

## 2024-11-12 PROCEDURE — 93306 TTE W/DOPPLER COMPLETE: CPT | Mod: PO

## 2024-11-12 PROCEDURE — 93306 TTE W/DOPPLER COMPLETE: CPT | Mod: 26,,, | Performed by: INTERNAL MEDICINE

## 2024-11-13 ENCOUNTER — TELEPHONE (OUTPATIENT)
Dept: UROLOGY | Facility: CLINIC | Age: 44
End: 2024-11-13
Payer: COMMERCIAL

## 2024-11-13 ENCOUNTER — HOSPITAL ENCOUNTER (OUTPATIENT)
Facility: HOSPITAL | Age: 44
Discharge: HOME | End: 2024-11-14
Attending: EMERGENCY MEDICINE | Admitting: EMERGENCY MEDICINE
Payer: COMMERCIAL

## 2024-11-13 ENCOUNTER — PATIENT MESSAGE (OUTPATIENT)
Dept: UROLOGY | Facility: CLINIC | Age: 44
End: 2024-11-13
Payer: COMMERCIAL

## 2024-11-13 DIAGNOSIS — R30.0 DYSURIA: ICD-10-CM

## 2024-11-13 DIAGNOSIS — R03.0 ELEVATED BLOOD PRESSURE READING: ICD-10-CM

## 2024-11-13 DIAGNOSIS — N12 PYELONEPHRITIS: Primary | ICD-10-CM

## 2024-11-13 LAB
ALBUMIN SERPL BCP-MCNC: 3.6 G/DL (ref 3.5–5.2)
ALLENS TEST: ABNORMAL
ALLENS TEST: NORMAL
ALP SERPL-CCNC: 82 U/L (ref 40–150)
ALT SERPL W/O P-5'-P-CCNC: 18 U/L (ref 10–44)
ANION GAP SERPL CALC-SCNC: 8 MMOL/L (ref 8–16)
ASCENDING AORTA: 4.02 CM
AST SERPL-CCNC: 13 U/L (ref 10–40)
AV INDEX (PROSTH): 0.7
AV MEAN GRADIENT: 6.5 MMHG
AV PEAK GRADIENT: 10.2 MMHG
AV VALVE AREA BY VELOCITY RATIO: 2.4 CM²
AV VALVE AREA: 2.4 CM²
AV VELOCITY RATIO: 0.69
B-HCG UR QL: NEGATIVE
BACTERIA #/AREA URNS AUTO: ABNORMAL /HPF
BASOPHILS # BLD AUTO: 0.06 K/UL (ref 0–0.2)
BASOPHILS NFR BLD: 0.4 % (ref 0–1.9)
BILIRUB SERPL-MCNC: 0.4 MG/DL (ref 0.1–1)
BILIRUB UR QL STRIP: NEGATIVE
BSA FOR ECHO PROCEDURE: 2.36 M2
BUN SERPL-MCNC: 10 MG/DL (ref 6–20)
BUN SERPL-MCNC: 10 MG/DL (ref 6–30)
CALCIUM SERPL-MCNC: 9.3 MG/DL (ref 8.7–10.5)
CHLORIDE SERPL-SCNC: 104 MMOL/L (ref 95–110)
CHLORIDE SERPL-SCNC: 106 MMOL/L (ref 95–110)
CLARITY UR REFRACT.AUTO: ABNORMAL
CO2 SERPL-SCNC: 22 MMOL/L (ref 23–29)
COLOR UR AUTO: COLORLESS
CREAT SERPL-MCNC: 0.7 MG/DL (ref 0.5–1.4)
CREAT SERPL-MCNC: 0.7 MG/DL (ref 0.5–1.4)
CTP QC/QA: YES
CV ECHO LV RWT: 0.38 CM
DELSYS: ABNORMAL
DELSYS: NORMAL
DIFFERENTIAL METHOD BLD: ABNORMAL
DOP CALC AO PEAK VEL: 1.6 M/S
DOP CALC AO VTI: 33.2 CM
DOP CALC LVOT AREA: 3.5 CM2
DOP CALC LVOT DIAMETER: 2.1 CM
DOP CALC LVOT PEAK VEL: 1.1 M/S
DOP CALC LVOT STROKE VOLUME: 80 CM3
DOP CALCLVOT PEAK VEL VTI: 23.1 CM
E WAVE DECELERATION TIME: 202.47 MSEC
E/A RATIO: 0.98
E/E' RATIO: 9.67 M/S
ECHO LV POSTERIOR WALL: 0.9 CM (ref 0.6–1.1)
EJECTION FRACTION: 60 %
EOSINOPHIL # BLD AUTO: 0.3 K/UL (ref 0–0.5)
EOSINOPHIL NFR BLD: 2.4 % (ref 0–8)
ERYTHROCYTE [DISTWIDTH] IN BLOOD BY AUTOMATED COUNT: 13.2 % (ref 11.5–14.5)
EST. GFR  (NO RACE VARIABLE): >60 ML/MIN/1.73 M^2
FRACTIONAL SHORTENING: 38.3 % (ref 28–44)
GLUCOSE SERPL-MCNC: 101 MG/DL (ref 70–110)
GLUCOSE SERPL-MCNC: 105 MG/DL (ref 70–110)
GLUCOSE UR QL STRIP: NEGATIVE
HCO3 UR-SCNC: 18.5 MMOL/L (ref 24–28)
HCT VFR BLD AUTO: 38.9 % (ref 37–48.5)
HCT VFR BLD CALC: 33 %PCV (ref 36–54)
HCT VFR BLD CALC: 37 %PCV (ref 36–54)
HCV AB SERPL QL IA: NORMAL
HGB BLD-MCNC: 12.7 G/DL (ref 12–16)
HGB UR QL STRIP: ABNORMAL
HIV 1+2 AB+HIV1 P24 AG SERPL QL IA: NORMAL
HYALINE CASTS UR QL AUTO: 0 /LPF
IMM GRANULOCYTES # BLD AUTO: 0.05 K/UL (ref 0–0.04)
IMM GRANULOCYTES NFR BLD AUTO: 0.4 % (ref 0–0.5)
INTERVENTRICULAR SEPTUM: 1 CM (ref 0.6–1.1)
KETONES UR QL STRIP: NEGATIVE
LA MAJOR: 5.49 CM
LA MINOR: 5.48 CM
LA WIDTH: 4.29 CM
LDH SERPL L TO P-CCNC: 0.74 MMOL/L (ref 0.5–2.2)
LEFT ATRIUM SIZE: 3.58 CM
LEFT ATRIUM VOLUME INDEX MOD: 27.9 ML/M2
LEFT ATRIUM VOLUME INDEX: 32.3 ML/M2
LEFT ATRIUM VOLUME MOD: 62.02 ML
LEFT ATRIUM VOLUME: 71.6 CM3
LEFT INTERNAL DIMENSION IN SYSTOLE: 2.9 CM (ref 2.1–4)
LEFT VENTRICLE DIASTOLIC VOLUME INDEX: 45.67 ML/M2
LEFT VENTRICLE DIASTOLIC VOLUME: 101.38 ML
LEFT VENTRICLE MASS INDEX: 69.1 G/M2
LEFT VENTRICLE SYSTOLIC VOLUME INDEX: 15 ML/M2
LEFT VENTRICLE SYSTOLIC VOLUME: 33.2 ML
LEFT VENTRICULAR INTERNAL DIMENSION IN DIASTOLE: 4.7 CM (ref 3.5–6)
LEFT VENTRICULAR MASS: 153.4 G
LEUKOCYTE ESTERASE UR QL STRIP: ABNORMAL
LIPASE SERPL-CCNC: 15 U/L (ref 4–60)
LV LATERAL E/E' RATIO: 8.29 M/S
LV SEPTAL E/E' RATIO: 11.6 M/S
LYMPHOCYTES # BLD AUTO: 2.3 K/UL (ref 1–4.8)
LYMPHOCYTES NFR BLD: 16.7 % (ref 18–48)
MCH RBC QN AUTO: 29.4 PG (ref 27–31)
MCHC RBC AUTO-ENTMCNC: 32.6 G/DL (ref 32–36)
MCV RBC AUTO: 90 FL (ref 82–98)
MICROSCOPIC COMMENT: ABNORMAL
MONOCYTES # BLD AUTO: 0.8 K/UL (ref 0.3–1)
MONOCYTES NFR BLD: 5.6 % (ref 4–15)
MV A" WAVE DURATION": 12.84 MSEC
MV PEAK A VEL: 0.59 M/S
MV PEAK E VEL: 0.58 M/S
MV STENOSIS PRESSURE HALF TIME: 58.72 MS
MV VALVE AREA P 1/2 METHOD: 3.75 CM2
NEUTROPHILS # BLD AUTO: 10.2 K/UL (ref 1.8–7.7)
NEUTROPHILS NFR BLD: 74.5 % (ref 38–73)
NITRITE UR QL STRIP: NEGATIVE
NRBC BLD-RTO: 0 /100 WBC
OHS CV RV/LV RATIO: 0.62 CM
PCO2 BLDA: 29.2 MMHG (ref 35–45)
PH SMN: 7.41 [PH] (ref 7.35–7.45)
PH UR STRIP: 7 [PH] (ref 5–8)
PISA TR MAX VEL: 2.27 M/S
PLATELET # BLD AUTO: 319 K/UL (ref 150–450)
PMV BLD AUTO: 10.3 FL (ref 9.2–12.9)
PO2 BLDA: 47 MMHG (ref 40–60)
POC BE: -6 MMOL/L
POC IONIZED CALCIUM: 0.95 MMOL/L (ref 1.06–1.42)
POC IONIZED CALCIUM: 1.18 MMOL/L (ref 1.06–1.42)
POC SATURATED O2: 84 % (ref 95–100)
POC TCO2 (MEASURED): 21 MMOL/L (ref 23–29)
POC TCO2: 19 MMOL/L (ref 24–29)
POTASSIUM BLD-SCNC: 3.2 MMOL/L (ref 3.5–5.1)
POTASSIUM BLD-SCNC: 3.8 MMOL/L (ref 3.5–5.1)
POTASSIUM SERPL-SCNC: 3.9 MMOL/L (ref 3.5–5.1)
PROT SERPL-MCNC: 7.1 G/DL (ref 6–8.4)
PROT UR QL STRIP: ABNORMAL
PULM VEIN S/D RATIO: 1.21
PV PEAK D VEL: 0.47 M/S
PV PEAK S VEL: 0.57 M/S
RA MAJOR: 5.55 CM
RA PRESSURE ESTIMATED: 3 MMHG
RA WIDTH: 3.01 CM
RBC # BLD AUTO: 4.32 M/UL (ref 4–5.4)
RBC #/AREA URNS AUTO: 4 /HPF (ref 0–4)
RIGHT VENTRICLE DIASTOLIC BASEL DIMENSION: 2.9 CM
RV TB RVSP: 5 MMHG
SAMPLE: ABNORMAL
SAMPLE: ABNORMAL
SAMPLE: NORMAL
SINUS: 3.8 CM
SITE: ABNORMAL
SITE: NORMAL
SODIUM BLD-SCNC: 137 MMOL/L (ref 136–145)
SODIUM BLD-SCNC: 139 MMOL/L (ref 136–145)
SODIUM SERPL-SCNC: 136 MMOL/L (ref 136–145)
SP GR UR STRIP: 1 (ref 1–1.03)
SQUAMOUS #/AREA URNS AUTO: 2 /HPF
STJ: 3.38 CM
TDI LATERAL: 0.07 M/S
TDI SEPTAL: 0.05 M/S
TDI: 0.06 M/S
TR MAX PG: 21 MMHG
TRICUSPID ANNULAR PLANE SYSTOLIC EXCURSION: 2.4 CM
TV REST PULMONARY ARTERY PRESSURE: 24 MMHG
URN SPEC COLLECT METH UR: ABNORMAL
WBC # BLD AUTO: 13.64 K/UL (ref 3.9–12.7)
WBC #/AREA URNS AUTO: 83 /HPF (ref 0–5)
Z-SCORE OF LEFT VENTRICULAR DIMENSION IN END DIASTOLE: -5.01
Z-SCORE OF LEFT VENTRICULAR DIMENSION IN END SYSTOLE: -3.83

## 2024-11-13 PROCEDURE — 81001 URINALYSIS AUTO W/SCOPE: CPT | Performed by: EMERGENCY MEDICINE

## 2024-11-13 PROCEDURE — 87086 URINE CULTURE/COLONY COUNT: CPT | Performed by: EMERGENCY MEDICINE

## 2024-11-13 PROCEDURE — G0378 HOSPITAL OBSERVATION PER HR: HCPCS

## 2024-11-13 PROCEDURE — 99900035 HC TECH TIME PER 15 MIN (STAT)

## 2024-11-13 PROCEDURE — 85025 COMPLETE CBC W/AUTO DIFF WBC: CPT | Performed by: EMERGENCY MEDICINE

## 2024-11-13 PROCEDURE — 96375 TX/PRO/DX INJ NEW DRUG ADDON: CPT

## 2024-11-13 PROCEDURE — 82800 BLOOD PH: CPT

## 2024-11-13 PROCEDURE — 83605 ASSAY OF LACTIC ACID: CPT

## 2024-11-13 PROCEDURE — 84132 ASSAY OF SERUM POTASSIUM: CPT

## 2024-11-13 PROCEDURE — 82330 ASSAY OF CALCIUM: CPT

## 2024-11-13 PROCEDURE — 84295 ASSAY OF SERUM SODIUM: CPT

## 2024-11-13 PROCEDURE — 87186 SC STD MICRODIL/AGAR DIL: CPT | Performed by: EMERGENCY MEDICINE

## 2024-11-13 PROCEDURE — 87088 URINE BACTERIA CULTURE: CPT | Performed by: EMERGENCY MEDICINE

## 2024-11-13 PROCEDURE — 85014 HEMATOCRIT: CPT

## 2024-11-13 PROCEDURE — 25500020 PHARM REV CODE 255: Performed by: EMERGENCY MEDICINE

## 2024-11-13 PROCEDURE — 63600175 PHARM REV CODE 636 W HCPCS: Performed by: EMERGENCY MEDICINE

## 2024-11-13 PROCEDURE — 87389 HIV-1 AG W/HIV-1&-2 AB AG IA: CPT | Performed by: PHYSICIAN ASSISTANT

## 2024-11-13 PROCEDURE — 25000003 PHARM REV CODE 250: Performed by: PHYSICIAN ASSISTANT

## 2024-11-13 PROCEDURE — 96376 TX/PRO/DX INJ SAME DRUG ADON: CPT

## 2024-11-13 PROCEDURE — 87040 BLOOD CULTURE FOR BACTERIA: CPT | Performed by: EMERGENCY MEDICINE

## 2024-11-13 PROCEDURE — 80053 COMPREHEN METABOLIC PANEL: CPT | Performed by: EMERGENCY MEDICINE

## 2024-11-13 PROCEDURE — 82803 BLOOD GASES ANY COMBINATION: CPT

## 2024-11-13 PROCEDURE — 83690 ASSAY OF LIPASE: CPT | Performed by: EMERGENCY MEDICINE

## 2024-11-13 PROCEDURE — 96374 THER/PROPH/DIAG INJ IV PUSH: CPT

## 2024-11-13 PROCEDURE — 81025 URINE PREGNANCY TEST: CPT | Performed by: EMERGENCY MEDICINE

## 2024-11-13 PROCEDURE — 99285 EMERGENCY DEPT VISIT HI MDM: CPT | Mod: 25

## 2024-11-13 PROCEDURE — 86803 HEPATITIS C AB TEST: CPT | Performed by: PHYSICIAN ASSISTANT

## 2024-11-13 RX ORDER — DIPHENHYDRAMINE HCL 25 MG
25 CAPSULE ORAL EVERY 6 HOURS PRN
Status: DISCONTINUED | OUTPATIENT
Start: 2024-11-13 | End: 2024-11-14 | Stop reason: HOSPADM

## 2024-11-13 RX ORDER — CEFTRIAXONE 1 G/1
1 INJECTION, POWDER, FOR SOLUTION INTRAMUSCULAR; INTRAVENOUS
Status: COMPLETED | OUTPATIENT
Start: 2024-11-13 | End: 2024-11-13

## 2024-11-13 RX ORDER — IBUPROFEN 600 MG/1
600 TABLET ORAL EVERY 8 HOURS PRN
Status: DISCONTINUED | OUTPATIENT
Start: 2024-11-13 | End: 2024-11-14 | Stop reason: HOSPADM

## 2024-11-13 RX ORDER — CEFTRIAXONE 1 G/1
1 INJECTION, POWDER, FOR SOLUTION INTRAMUSCULAR; INTRAVENOUS DAILY
Status: DISCONTINUED | OUTPATIENT
Start: 2024-11-14 | End: 2024-11-14 | Stop reason: HOSPADM

## 2024-11-13 RX ORDER — ALPRAZOLAM 0.25 MG/1
1 TABLET ORAL 2 TIMES DAILY PRN
Status: DISCONTINUED | OUTPATIENT
Start: 2024-11-13 | End: 2024-11-14 | Stop reason: HOSPADM

## 2024-11-13 RX ORDER — ONDANSETRON 4 MG/1
4 TABLET, ORALLY DISINTEGRATING ORAL EVERY 6 HOURS PRN
Status: DISCONTINUED | OUTPATIENT
Start: 2024-11-13 | End: 2024-11-14 | Stop reason: HOSPADM

## 2024-11-13 RX ORDER — TALC
6 POWDER (GRAM) TOPICAL NIGHTLY PRN
Status: DISCONTINUED | OUTPATIENT
Start: 2024-11-13 | End: 2024-11-14 | Stop reason: HOSPADM

## 2024-11-13 RX ORDER — ONDANSETRON HYDROCHLORIDE 2 MG/ML
4 INJECTION, SOLUTION INTRAVENOUS
Status: COMPLETED | OUTPATIENT
Start: 2024-11-13 | End: 2024-11-13

## 2024-11-13 RX ORDER — LOSARTAN POTASSIUM 25 MG/1
25 TABLET ORAL NIGHTLY
Status: DISCONTINUED | OUTPATIENT
Start: 2024-11-13 | End: 2024-11-14 | Stop reason: HOSPADM

## 2024-11-13 RX ORDER — ACETAMINOPHEN 325 MG/1
650 TABLET ORAL EVERY 6 HOURS PRN
Status: DISCONTINUED | OUTPATIENT
Start: 2024-11-13 | End: 2024-11-14 | Stop reason: HOSPADM

## 2024-11-13 RX ORDER — SPIRONOLACTONE 25 MG/1
25 TABLET ORAL DAILY
Status: DISCONTINUED | OUTPATIENT
Start: 2024-11-14 | End: 2024-11-14 | Stop reason: HOSPADM

## 2024-11-13 RX ORDER — MORPHINE SULFATE 2 MG/ML
6 INJECTION, SOLUTION INTRAMUSCULAR; INTRAVENOUS
Status: COMPLETED | OUTPATIENT
Start: 2024-11-13 | End: 2024-11-13

## 2024-11-13 RX ORDER — SODIUM CHLORIDE 0.9 % (FLUSH) 0.9 %
10 SYRINGE (ML) INJECTION
Status: DISCONTINUED | OUTPATIENT
Start: 2024-11-13 | End: 2024-11-14 | Stop reason: HOSPADM

## 2024-11-13 RX ORDER — MORPHINE SULFATE 15 MG/1
15 TABLET ORAL EVERY 6 HOURS PRN
Status: DISCONTINUED | OUTPATIENT
Start: 2024-11-13 | End: 2024-11-14 | Stop reason: HOSPADM

## 2024-11-13 RX ADMIN — ONDANSETRON 4 MG: 2 INJECTION INTRAMUSCULAR; INTRAVENOUS at 06:11

## 2024-11-13 RX ADMIN — MORPHINE SULFATE 6 MG: 2 INJECTION, SOLUTION INTRAMUSCULAR; INTRAVENOUS at 09:11

## 2024-11-13 RX ADMIN — IOHEXOL 100 ML: 350 INJECTION, SOLUTION INTRAVENOUS at 07:11

## 2024-11-13 RX ADMIN — CEFTRIAXONE SODIUM 1 G: 1 INJECTION, POWDER, FOR SOLUTION INTRAMUSCULAR; INTRAVENOUS at 09:11

## 2024-11-13 RX ADMIN — LOSARTAN POTASSIUM 25 MG: 25 TABLET, FILM COATED ORAL at 11:11

## 2024-11-13 RX ADMIN — MORPHINE SULFATE 6 MG: 2 INJECTION, SOLUTION INTRAMUSCULAR; INTRAVENOUS at 06:11

## 2024-11-13 NOTE — ED NOTES
Patient states pain to right lower back that radiates to front, reports pain with urination, Has been treated with Bactrim, switched to Macrobid after culture came back.Reports constant pain since lunch, Emotional

## 2024-11-13 NOTE — Clinical Note
Diagnosis: Pyelonephritis [199314]   Future Attending Provider: LEE ANN JOHNSON [[id number]]   Is the patient being sent to ED Observation?: Yes

## 2024-11-13 NOTE — TELEPHONE ENCOUNTER
Called pt to discuss UTI symptoms. Pt stated she was in the parking lot of the ER because the pain in her kidneys and bladder was so intense. Pt stated she felt pain after urinating. Informed pt I was going to schedule her for a virtual with Dr. Hussein, but if the pain is that extreme she should go to the ER. Pt stated she cancelled her CT scan. Informed pt I would send her a message on the portal, and that she should go to the ER. Pt voiced understanding.    ----- Message from Nurse Monroe sent at 11/13/2024  4:35 PM CST -----    ----- Message -----  From: Florentino Garcia  Sent: 11/13/2024   3:19 PM CST  To: [facility] Staff     Name of Who is Calling:     What is the request in detail:  patient request call back in reference to UTI / patient state she just finished antibiotics and feels like UTI is coming back Please contact to further discuss and advise      Can the clinic reply by [facility]:     What Number to Call Back if not in [facility]:   [phone]

## 2024-11-13 NOTE — ED PROVIDER NOTES
"Encounter Date: 11/13/2024       History     Chief Complaint   Patient presents with    Flank Pain    Bladder Pain     Has been being treated for uti; hx of kidney stones. Pt crying in triage     Rubina Gonzalez 44-year-old female with a history of kidney stones, migraines presenting today for flank pain.  Had a telehealth visit on 11/03 for dysuria and prescribed Bactrim.  Followed up with Urology, urine culture ordered which showed resistance to Bactrim.  She was started on Macrobid.  She also had a renal ultrasound that showed nonobstructing stones, no hydro or obstructive uropathy.  She completed Macrobid yesterday.  She has had progressive right flank pain that is radiating down to the right groin the past several days.  Also having bladder spasms/pain after urination as well as frequent urination.  No fevers but has had chills today.  Feels nauseated, no vomiting.  Normal bowel movements.  Took a leave prior to arrival without relief.      Review of patient's allergies indicates:   Allergen Reactions    Ciprofloxacin Hives     Other reaction(s): constipation & face swelling  Other reaction(s): constipation & face swelling    Cephalexin Rash     Past Medical History:   Diagnosis Date    Abnormal Pap smear of cervix 11/12/2015    ASCUS / Hpv Neg    COVID-19 virus infection 07/2020    Depression     Family history of breast cancer in mother     dx. age 48    Galactorrhea     History of kidney stones 03/2023    Hypertension     on Lisinopril    Insomnia     Irregular menstrual bleeding     Migraine     topomax    Panic attacks      Past Surgical History:   Procedure Laterality Date    BREAST BIOPSY Right 03/24/2017    Core Bx  (Path:  Fibrocystic Changes)    BREAST LUMPECTOMY W/ NEEDLE LOCALIZATION Right 11/2019    per  (Path Diag: "simple benign cyst" - f/u every 6 mos.    CHOLECYSTECTOMY  2006    CYSTOURETEROSCOPY,WITH HOLMIUM LASER LITHOTRIPSY OF URETERAL CALCULUS Left 8/30/2023    Procedure: " CYSTOURETEROSCOPY,WITH HOLMIUM LASER LITHOTRIPSY OF URETERAL CALCULUS;  Surgeon: Luis Hussein MD;  Location: [facility] OR;  Service: Urology;  Laterality: Left;  1.5 hrs    ENDOMETRIAL ABLATION  03/28/2017    Dr Adrian Bone    URETERAL STENT PLACEMENT Left 8/30/2023    Procedure: INSERTION, STENT, URETER;  Surgeon: Luis Hussein MD;  Location: [facility] OR;  Service: Urology;  Laterality: Left;     Family History   Problem Relation Name Age of Onset    Colon cancer Paternal Grandmother      Cancer Paternal Grandmother      Breast cancer Paternal Grandmother      Colon cancer Maternal Grandfather      Cancer Maternal Grandfather      Hypertension Father      Breast cancer Mother  57    Hypertension Mother      Cancer Mother      No Known Problems Brother      No Known Problems Brother      Depression Daughter      Depression Daughter      No Known Problems Daughter      No Known Problems Daughter      Breast cancer Paternal Aunt      Cancer Paternal Aunt      Ovarian cancer Neg Hx       Social History     Tobacco Use    Smoking status: Never    Smokeless tobacco: Never   Substance Use Topics    Alcohol use: Yes     Comment: Social    Drug use: No     Review of Systems    Physical Exam     Initial Vitals   BP Pulse Resp Temp SpO2   11/13/24 1647 11/13/24 1647 11/13/24 1647 11/13/24 1649 11/13/24 1647   (!) 193/108 95 20 98 °F (36.7 °C) 99 %      MAP       --                Physical Exam    Nursing note and vitals reviewed.  Constitutional: She appears well-developed and well-nourished. She appears distressed.   + tearful due to pain   HENT: Mouth/Throat: Oropharynx is clear and moist.   Eyes: Conjunctivae are normal. No scleral icterus.   Neck: No JVD present.   Cardiovascular:  Normal rate, regular rhythm and intact distal pulses.           Pulmonary/Chest: Breath sounds normal. No respiratory distress. She has no wheezes. She has no rales.   Abdominal: Abdomen is soft. Bowel sounds are normal. She exhibits no  distension. There is abdominal tenderness (Right mid, right lower quadrant tenderness).   + right CVA There is no rebound.   Musculoskeletal:         General: No edema.     Lymphadenopathy:     She has no cervical adenopathy.   Neurological: She is alert and oriented to person, place, and time.   Skin: Skin is warm. No rash noted.         ED Course   Procedures  Labs Reviewed   CBC W/ AUTO DIFFERENTIAL - Abnormal       Result Value    WBC 13.64 (*)     RBC 4.32      Hemoglobin 12.7      Hematocrit 38.9      MCV 90      MCH 29.4      MCHC 32.6      RDW 13.2      Platelets 319      MPV 10.3      Immature Granulocytes 0.4      Gran # (ANC) 10.2 (*)     Immature Grans (Abs) 0.05 (*)     Lymph # 2.3      Mono # 0.8      Eos # 0.3      Baso # 0.06      nRBC 0      Gran % 74.5 (*)     Lymph % 16.7 (*)     Mono % 5.6      Eosinophil % 2.4      Basophil % 0.4      Differential Method Automated     COMPREHENSIVE METABOLIC PANEL - Abnormal    Sodium 136      Potassium 3.9      Chloride 106      CO2 22 (*)     Glucose 101      BUN 10      Creatinine 0.7      Calcium 9.3      Total Protein 7.1      Albumin 3.6      Total Bilirubin 0.4      Alkaline Phosphatase 82      AST 13      ALT 18      eGFR >60.0      Anion Gap 8     URINALYSIS, REFLEX TO URINE CULTURE - Abnormal    Specimen UA Urine, Clean Catch      Color, UA Colorless (*)     Appearance, UA Hazy (*)     pH, UA 7.0      Specific Gravity, UA 1.005      Protein, UA 1+ (*)     Glucose, UA Negative      Ketones, UA Negative      Bilirubin (UA) Negative      Occult Blood UA Trace (*)     Nitrite, UA Negative      Leukocytes, UA 3+ (*)     Narrative:     Specimen Source->Urine   URINALYSIS MICROSCOPIC - Abnormal    RBC, UA 4      WBC, UA 83 (*)     Bacteria Many (*)     Squam Epithel, UA 2      Hyaline Casts, UA 0      Microscopic Comment SEE COMMENT      Narrative:     Specimen Source->Urine   ISTAT PROCEDURE - Abnormal    POC Glucose 105      POC BUN 10      POC Creatinine  0.7      POC Sodium 137      POC Potassium 3.8      POC Chloride 104      POC TCO2 (MEASURED) 21 (*)     POC Ionized Calcium 1.18      POC Hematocrit 37      Sample PB     CULTURE, URINE   CULTURE, BLOOD   CULTURE, BLOOD   HEPATITIS C ANTIBODY    Hepatitis C Ab Non-reactive      Narrative:     Release to patient->Immediate   HIV 1 / 2 ANTIBODY    HIV 1/2 Ag/Ab Non-reactive      Narrative:     Release to patient->Immediate   LIPASE    Lipase 15     POCT URINE PREGNANCY    POC Preg Test, Ur Negative       Acceptable Yes     ISTAT CHEM8          Imaging Results               CT Abdomen Pelvis With IV Contrast NO Oral Contrast (Final result)  Result time 11/13/24 19:48:28      Final result by Tono Cleaning MD (11/13/24 19:48:28)                   Impression:      1. There is minimal asymmetric wall thickening involving the right renal pelvis and right ureter with minimal periureteral stranding may be associated with urinary tract infection on the right.  Recommend clinical correlation and follow-up.  2. Status post cholecystectomy with mild dilation of the intrahepatic and extrahepatic biliary ducts, new from the prior study.  This could be a postop change and no obstruction is detected.  ERCP may be helpful if clinically indicated.  3. Hepatosplenomegaly.  4. 3.5 cm simple right ovarian cyst and 1.7 cm minimally complex probable involuting left ovarian cyst.  5. Diverticulosis of the colon.  6. Possible constipation.  7. See above comments also.  8. This report was flagged in Epic as abnormal.      Electronically signed by: Tono Cleaning  Date:    11/13/2024  Time:    19:48               Narrative:    EXAMINATION:  CT ABDOMEN PELVIS WITH IV CONTRAST    CLINICAL HISTORY:  Abdominal abscess/infection suspected;RLQ abdominal pain (Age >= 14y);    TECHNIQUE:  Low dose axial images, sagittal and coronal reformations were obtained from the lung bases to the pubic symphysis following the IV administration  of 100 mL of Omnipaque 350 .  Oral contrast was not administered.    COMPARISON:  08/18/2023    FINDINGS:  Abdomen:    - Lower thorax:    - Lung bases: No infiltrates and no nodules.    - Liver: Liver is enlarged.  Small hepatic cyst in the left lobe.    - Gallbladder: Status post cholecystectomy.    - Bile Ducts: Mild dilation of the intrahepatic and extrahepatic biliary ducts may be a postoperative change.  No obstruction is detected.  Dilated ducts are new from the prior study.  Common duct measures approximately 11 mm.  ERCP may be helpful if clinically indicated.    - Spleen: Spleen is mildly enlarged no focal abnormality.    - Kidneys: No stone, mass, hydronephrosis or hydroureter bilaterally.  Kidneys enhance normally bilaterally.  There is minimal wall thickening involving the right renal pelvis and right ureter with minimal periureteral stranding may be associated with UTI on the right.  Recommend clinical correlation.    - Adrenals: Subcentimeter left adrenal nodule on the left, likely a small adenoma.  No significant change.    - Pancreas: No mass or peripancreatic fat stranding.    - Retroperitoneum:  No significant adenopathy.    - Vascular: No abdominal aortic aneurysm.    - Abdominal wall:  Small fat containing umbilical hernia.    Pelvis:    Urinary bladder is within normal limits.    The uterus is midline.    3.5 cm simple right ovarian cyst.  1.7 cm minimally complex probable involuting left ovarian cyst.    Bowel/Mesentery:    No evidence of bowel obstruction or inflammation.  There is diverticulosis of the colon.  No evidence of acute diverticulitis.  Retained feces in the colon, predominantly on the right.    Bones:  No acute osseous abnormality and no suspicious lytic or blastic lesion.                                       Medications   cefTRIAXone injection 1 g (has no administration in time range)   morphine injection 6 mg (has no administration in time range)   morphine injection 6 mg (6 mg  Intravenous Given 11/13/24 1804)   ondansetron injection 4 mg (4 mg Intravenous Given 11/13/24 1804)   iohexoL (OMNIPAQUE 350) injection 100 mL (100 mLs Intravenous Given 11/13/24 1918)     Medical Decision Making  Emergent evaluation a 44-year-old female presenting with worsening right flank pain with chills in the setting of recent UTI.    Hypertensive, afebrile otherwise vital signs are stable.  Appears uncomfortable but nontoxic.    Differential diagnosis includes but not limited to pyelonephritis, nephrolithiasis, diverticulitis, constipation,  colitis, persistent UTI    Plan:  Labs, CT abdomen pelvis, morphine, Zofran    Amount and/or Complexity of Data Reviewed  Labs: ordered. Decision-making details documented in ED Course.  Radiology: ordered and independent interpretation performed.    Risk  Prescription drug management.               ED Course as of 11/13/24 2129   Wed Nov 13, 2024   1850 White count elevated at 13K.  My concern is possibly ascending infection which will be better visualized with contrast.  Order modified. [GM]   2126 CMP unremarkable.  UA with 83 WBCs, many bacteria, nitrite negative.    CT scan concerning for ascending infection of the right renal collecting system, no evidence of obstruction.    Urine culture from 11/05 resistant to Bactrim.  Macrobid does not have sufficient coverage for pyelo.  Will place in observation for Rocephin, repeat labs in a.m..  Pain control.  Transition to oral antibiotics. [GM]   2128 Discussed plan with patient.  She expressed understanding.  Patient transferred to EDOU [GM]      ED Course User Index  [GM] Nancy Chen MD                           Clinical Impression:  Final diagnoses:  [N12] Pyelonephritis (Primary)          ED Disposition Condition    Observation                 Nancy Chen MD  11/13/24 2129

## 2024-11-14 ENCOUNTER — PATIENT MESSAGE (OUTPATIENT)
Dept: INTERNAL MEDICINE | Facility: CLINIC | Age: 44
End: 2024-11-14
Payer: COMMERCIAL

## 2024-11-14 VITALS
HEART RATE: 71 BPM | TEMPERATURE: 98 F | RESPIRATION RATE: 16 BRPM | DIASTOLIC BLOOD PRESSURE: 72 MMHG | OXYGEN SATURATION: 97 % | BODY MASS INDEX: 46.1 KG/M2 | WEIGHT: 270 LBS | SYSTOLIC BLOOD PRESSURE: 170 MMHG | HEIGHT: 64 IN

## 2024-11-14 LAB
ANION GAP SERPL CALC-SCNC: 7 MMOL/L (ref 8–16)
BASOPHILS # BLD AUTO: 0.06 K/UL (ref 0–0.2)
BASOPHILS NFR BLD: 0.6 % (ref 0–1.9)
BUN SERPL-MCNC: 9 MG/DL (ref 6–20)
CALCIUM SERPL-MCNC: 8.9 MG/DL (ref 8.7–10.5)
CHLORIDE SERPL-SCNC: 106 MMOL/L (ref 95–110)
CO2 SERPL-SCNC: 25 MMOL/L (ref 23–29)
CREAT SERPL-MCNC: 0.8 MG/DL (ref 0.5–1.4)
DIFFERENTIAL METHOD BLD: NORMAL
EOSINOPHIL # BLD AUTO: 0.4 K/UL (ref 0–0.5)
EOSINOPHIL NFR BLD: 3.8 % (ref 0–8)
ERYTHROCYTE [DISTWIDTH] IN BLOOD BY AUTOMATED COUNT: 13.2 % (ref 11.5–14.5)
EST. GFR  (NO RACE VARIABLE): >60 ML/MIN/1.73 M^2
GLUCOSE SERPL-MCNC: 94 MG/DL (ref 70–110)
HCT VFR BLD AUTO: 37.6 % (ref 37–48.5)
HGB BLD-MCNC: 12.5 G/DL (ref 12–16)
IMM GRANULOCYTES # BLD AUTO: 0.04 K/UL (ref 0–0.04)
IMM GRANULOCYTES NFR BLD AUTO: 0.4 % (ref 0–0.5)
LYMPHOCYTES # BLD AUTO: 2.1 K/UL (ref 1–4.8)
LYMPHOCYTES NFR BLD: 21.7 % (ref 18–48)
MCH RBC QN AUTO: 29.9 PG (ref 27–31)
MCHC RBC AUTO-ENTMCNC: 33.2 G/DL (ref 32–36)
MCV RBC AUTO: 90 FL (ref 82–98)
MONOCYTES # BLD AUTO: 0.7 K/UL (ref 0.3–1)
MONOCYTES NFR BLD: 7.6 % (ref 4–15)
NEUTROPHILS # BLD AUTO: 6.4 K/UL (ref 1.8–7.7)
NEUTROPHILS NFR BLD: 65.9 % (ref 38–73)
NRBC BLD-RTO: 0 /100 WBC
PLATELET # BLD AUTO: 294 K/UL (ref 150–450)
PMV BLD AUTO: 10.3 FL (ref 9.2–12.9)
POTASSIUM SERPL-SCNC: 4.1 MMOL/L (ref 3.5–5.1)
RBC # BLD AUTO: 4.18 M/UL (ref 4–5.4)
SODIUM SERPL-SCNC: 138 MMOL/L (ref 136–145)
WBC # BLD AUTO: 9.69 K/UL (ref 3.9–12.7)

## 2024-11-14 PROCEDURE — 85025 COMPLETE CBC W/AUTO DIFF WBC: CPT | Performed by: PHYSICIAN ASSISTANT

## 2024-11-14 PROCEDURE — 80048 BASIC METABOLIC PNL TOTAL CA: CPT | Performed by: PHYSICIAN ASSISTANT

## 2024-11-14 PROCEDURE — 96376 TX/PRO/DX INJ SAME DRUG ADON: CPT

## 2024-11-14 PROCEDURE — G0378 HOSPITAL OBSERVATION PER HR: HCPCS

## 2024-11-14 PROCEDURE — 25000003 PHARM REV CODE 250: Performed by: PHYSICIAN ASSISTANT

## 2024-11-14 PROCEDURE — 63600175 PHARM REV CODE 636 W HCPCS: Performed by: PHYSICIAN ASSISTANT

## 2024-11-14 PROCEDURE — 25000003 PHARM REV CODE 250: Performed by: NURSE PRACTITIONER

## 2024-11-14 RX ORDER — PHENAZOPYRIDINE HYDROCHLORIDE 200 MG/1
200 TABLET, FILM COATED ORAL
Status: COMPLETED | OUTPATIENT
Start: 2024-11-14 | End: 2024-11-14

## 2024-11-14 RX ORDER — PHENAZOPYRIDINE HYDROCHLORIDE 200 MG/1
200 TABLET, FILM COATED ORAL 3 TIMES DAILY PRN
Qty: 5 TABLET | Refills: 0 | Status: SHIPPED | OUTPATIENT
Start: 2024-11-14

## 2024-11-14 RX ORDER — LOSARTAN POTASSIUM 25 MG/1
25 TABLET ORAL DAILY
Qty: 90 TABLET | Refills: 2 | Status: SHIPPED | OUTPATIENT
Start: 2024-11-14

## 2024-11-14 RX ORDER — CEFPODOXIME PROXETIL 200 MG/1
200 TABLET, FILM COATED ORAL 2 TIMES DAILY
Qty: 20 TABLET | Refills: 0 | Status: SHIPPED | OUTPATIENT
Start: 2024-11-14

## 2024-11-14 RX ADMIN — IBUPROFEN 600 MG: 600 TABLET, FILM COATED ORAL at 08:11

## 2024-11-14 RX ADMIN — SPIRONOLACTONE 25 MG: 25 TABLET ORAL at 08:11

## 2024-11-14 RX ADMIN — PHENAZOPYRIDINE HYDROCHLORIDE 200 MG: 200 TABLET ORAL at 11:11

## 2024-11-14 RX ADMIN — IBUPROFEN 600 MG: 600 TABLET, FILM COATED ORAL at 01:11

## 2024-11-14 RX ADMIN — MORPHINE SULFATE 15 MG: 15 TABLET ORAL at 08:11

## 2024-11-14 RX ADMIN — CEFTRIAXONE SODIUM 1 G: 1 INJECTION, POWDER, FOR SOLUTION INTRAMUSCULAR; INTRAVENOUS at 08:11

## 2024-11-14 RX ADMIN — ONDANSETRON 4 MG: 4 TABLET, ORALLY DISINTEGRATING ORAL at 08:11

## 2024-11-14 RX ADMIN — MORPHINE SULFATE 15 MG: 15 TABLET ORAL at 02:11

## 2024-11-14 NOTE — H&P
ED Observation Unit  History and Physical      I assumed care of this patient from the Main ED at onset of observation time, 9:26 PM on 11/13/2024.       History of Present Illness:    Rubina Gonzalez is a 44 y.o. female with medical history of HTN, Nephrolithiasis presenting to the ED with the chief complaint of flank pain. Had a telehealth visit on 11/03 for dysuria and prescribed Bactrim. Followed up with Urology, urine culture ordered which showed resistance to Bactrim. She was started on Macrobid. She also had a renal ultrasound that showed nonobstructing stones, no hydro or obstructive uropathy. She completed Macrobid yesterday. She has had progressive right flank pain that is radiating down to the right groin the past several days. Also having bladder spasms/pain after urination as well as frequent urination. No fevers but has had chills today. Feels nauseated, no vomiting. Normal bowel movements. Took a leave prior to arrival without relief.     In the ED, labs significant for WBC 13.6k, Crt 0.7, UA consistent with UTI. CT ab/pelv showing R renal pelvis and ureter minimal wall thickening concerning for pyelonephritis. No obstructive stone. She received Zofran 4mg IV, Morphine 6mg IV x2, Rocephin 1g IV x1.     I reviewed the ED Provider Note dated 11/13/24 prior to my evaluation of this patient.  I reviewed all labs and imaging performed in the Main ED, prior to patient being placed in Observation. Patient was placed in the ED Observation Unit for Pyelonephritis.    PMHx   Past Medical History:   Diagnosis Date    Abnormal Pap smear of cervix 11/12/2015    ASCUS / Hpv Neg    COVID-19 virus infection 07/2020    Depression     Family history of breast cancer in mother     dx. age 48    Galactorrhea     History of kidney stones 03/2023    Hypertension     on Lisinopril    Insomnia     Irregular menstrual bleeding     Migraine     topomax    Panic attacks       Past Surgical History:   Procedure Laterality Date  "   BREAST BIOPSY Right 03/24/2017    Core Bx  (Path:  Fibrocystic Changes)    BREAST LUMPECTOMY W/ NEEDLE LOCALIZATION Right 11/2019    per  (Path Diag: "simple benign cyst" - f/u every 6 mos.    CHOLECYSTECTOMY  2006    CYSTOURETEROSCOPY,WITH HOLMIUM LASER LITHOTRIPSY OF URETERAL CALCULUS Left 8/30/2023    Procedure: CYSTOURETEROSCOPY,WITH HOLMIUM LASER LITHOTRIPSY OF URETERAL CALCULUS;  Surgeon: Luis Hussein MD;  Location: [facility] OR;  Service: Urology;  Laterality: Left;  1.5 hrs    ENDOMETRIAL ABLATION  03/28/2017    Dr Adrian Bone    URETERAL STENT PLACEMENT Left 8/30/2023    Procedure: INSERTION, STENT, URETER;  Surgeon: Luis Hussein MD;  Location: [facility] OR;  Service: Urology;  Laterality: Left;        Family Hx   Family History   Problem Relation Name Age of Onset    Colon cancer Paternal Grandmother      Cancer Paternal Grandmother      Breast cancer Paternal Grandmother      Colon cancer Maternal Grandfather      Cancer Maternal Grandfather      Hypertension Father      Breast cancer Mother  57    Hypertension Mother      Cancer Mother      No Known Problems Brother      No Known Problems Brother      Depression Daughter      Depression Daughter      No Known Problems Daughter      No Known Problems Daughter      Breast cancer Paternal Aunt      Cancer Paternal Aunt      Ovarian cancer Neg Hx          Social Hx   Social History     Socioeconomic History    Marital status:    Tobacco Use    Smoking status: Never    Smokeless tobacco: Never   Substance and Sexual Activity    Alcohol use: Yes     Comment: Social    Drug use: No    Sexual activity: Yes     Partners: Male     Birth control/protection: Partner-Vasectomy     Comment: :  Ha   Social History Narrative    Four girls, one accepted to [employer]     Social Drivers of Health     Financial Resource Strain: Low Risk  (6/11/2024)    Overall Financial Resource Strain (CARDIA)     Difficulty of Paying Living Expenses: " Not hard at all   Food Insecurity: No Food Insecurity (6/11/2024)    Hunger Vital Sign     Worried About Running Out of Food in the Last Year: Never true     Ran Out of Food in the Last Year: Never true   Transportation Needs: No Transportation Needs (9/12/2023)    PRAPARE - Transportation     Lack of Transportation (Medical): No     Lack of Transportation (Non-Medical): No   Physical Activity: Inactive (6/11/2024)    Exercise Vital Sign     Days of Exercise per Week: 0 days     Minutes of Exercise per Session: 0 min   Stress: Stress Concern Present (6/11/2024)    [employer] - Occupational Stress Questionnaire     Feeling of Stress : To some extent   Housing Stability: High Risk (9/12/2023)    Housing Stability Vital Sign     Unable to Pay for Housing in the Last Year: Yes     Number of Places Lived in the Last Year: 1     Unstable Housing in the Last Year: No        Vital Signs   Vitals:    11/13/24 1917 11/13/24 2038 11/13/24 2039 11/13/24 2131   BP: (!) 154/87  134/67    BP Location:       Pulse: 71 61 65    Resp: 16 18  18   Temp: 97.9 °F (36.6 °C) 98 °F (36.7 °C)     TempSrc: Oral Oral     SpO2: 97% 98% 97%    Weight:       Height:            Review of Systems  Review of Systems   Genitourinary:  Positive for dysuria and flank pain.       Physical Exam  Physical Exam  Constitutional:       General: She is not in acute distress.     Appearance: Normal appearance. She is not toxic-appearing or diaphoretic.   HENT:      Head: Normocephalic and atraumatic.      Mouth/Throat:      Mouth: Mucous membranes are moist.   Eyes:      General: No scleral icterus.     Extraocular Movements: Extraocular movements intact.      Conjunctiva/sclera: Conjunctivae normal.      Pupils: Pupils are equal, round, and reactive to light.   Cardiovascular:      Rate and Rhythm: Normal rate and regular rhythm.      Pulses: Normal pulses.      Heart sounds: Normal heart sounds.   Pulmonary:      Effort:  Pulmonary effort is normal. No respiratory distress.      Breath sounds: Normal breath sounds. No wheezing or rales.   Abdominal:      General: Abdomen is flat. There is no distension.      Palpations: Abdomen is soft.      Tenderness: There is no abdominal tenderness.      Comments: +R CVA tenderness   Musculoskeletal:         General: No swelling or tenderness. Normal range of motion.      Cervical back: Normal range of motion and neck supple. No rigidity or tenderness.   Skin:     General: Skin is warm and dry.   Neurological:      Mental Status: She is alert and oriented to person, place, and time. Mental status is at baseline.      Sensory: No sensory deficit.      Motor: No weakness.         Medications:   Scheduled Meds:   [START ON 11/14/2024] cefTRIAXone (Rocephin) IV (PEDS and ADULTS)  1 g Intravenous Daily    [START ON 11/14/2024] linaCLOtide  72 mcg Oral Before breakfast    losartan  25 mg Oral QHS    [START ON 11/14/2024] spironolactone  25 mg Oral Daily     Continuous Infusions:  PRN Meds:.  Current Facility-Administered Medications:     acetaminophen, 650 mg, Oral, Q6H PRN    ALPRAZolam, 1 mg, Oral, BID PRN    diphenhydrAMINE, 25 mg, Oral, Q6H PRN    ibuprofen, 600 mg, Oral, Q8H PRN    melatonin, 6 mg, Oral, Nightly PRN    morphine, 15 mg, Oral, Q6H PRN    ondansetron, 4 mg, Oral, Q6H PRN    sodium chloride 0.9%, 10 mL, Intravenous, PRN      Assessment/Plan:  Pyelonephritis  -Reports having dysuria 2 weeks ago. Initially prescribed Bactrim which UCx showed resistance. Switched to Macrobid by her Urologist and had improvement in her symptoms. Now presenting with R flank pain.  -UA consistent with UTI. CT ab/pelv showing findings concerning for R sided pyelonephritis. No obstructive uropathy.  -Labs significant for  WBC 13.6k. Crt normal 0.7. VBG pH normal and Lactate normal. BCx ordered and in process.   -Received Rocephin 1g in the ED. Will continue daily with plans on discharging on V[place]in.   -PRN  Analgesics and anti-emetics ordered.  -Outpatient follow-up with her Urologist     Case was discussed with the ED provider, Dr. Chen.

## 2024-11-14 NOTE — TELEPHONE ENCOUNTER
No care due was identified.  [facility] Embedded Care Due Messages. Reference number: [id number].   11/14/2024 4:22:02 PM CST

## 2024-11-14 NOTE — PROGRESS NOTES
ED Observation Unit  Progress Note    11/14/24    HPI   Rubina Gonzalez is a 44 y.o. female with medical history of HTN, Nephrolithiasis presenting to the ED with the chief complaint of flank pain. Had a telehealth visit on 11/03 for dysuria and prescribed Bactrim. Followed up with Urology, urine culture ordered which showed resistance to Bactrim. She was started on Macrobid. She also had a renal ultrasound that showed nonobstructing stones, no hydro or obstructive uropathy. She completed Macrobid yesterday. She has had progressive right flank pain that is radiating down to the right groin the past several days. Also having bladder spasms/pain after urination as well as frequent urination. No fevers but has had chills today. Feels nauseated, no vomiting. Normal bowel movements. Took a leave prior to arrival without relief.      In the ED, labs significant for WBC 13.6k, Crt 0.7, UA consistent with UTI. CT ab/pelv showing R renal pelvis and ureter minimal wall thickening concerning for pyelonephritis. No obstructive stone. She received Zofran 4mg IV, Morphine 6mg IV x2, Rocephin 1g IV x1.      Patient was placed in the ED Observation Unit for Pyelonephritis.    Interval History   Pt resting in bed. VSS. Reviewed am labs. No leukocytosis. Creatinine normal. Pt states feeling better. Reports mild dysuria but states abdominal pain and flank pain is improved.    PMHx   Past Medical History:   Diagnosis Date    Abnormal Pap smear of cervix 11/12/2015    ASCUS / Hpv Neg    COVID-19 virus infection 07/2020    Depression     Family history of breast cancer in mother     dx. age 48    Galactorrhea     History of kidney stones 03/2023    Hypertension     on Lisinopril    Insomnia     Irregular menstrual bleeding     Migraine     topomax    Panic attacks       Past Surgical History:   Procedure Laterality Date    BREAST BIOPSY Right 03/24/2017    Core Bx  (Path:  Fibrocystic Changes)    BREAST LUMPECTOMY W/ NEEDLE LOCALIZATION  "Right 11/2019    per  (Path Diag: "simple benign cyst" - f/u every 6 mos.    CHOLECYSTECTOMY  2006    CYSTOURETEROSCOPY,WITH HOLMIUM LASER LITHOTRIPSY OF URETERAL CALCULUS Left 8/30/2023    Procedure: CYSTOURETEROSCOPY,WITH HOLMIUM LASER LITHOTRIPSY OF URETERAL CALCULUS;  Surgeon: Luis Hussein MD;  Location: [facility] OR;  Service: Urology;  Laterality: Left;  1.5 hrs    ENDOMETRIAL ABLATION  03/28/2017    Dr Adrian Bone    URETERAL STENT PLACEMENT Left 8/30/2023    Procedure: INSERTION, STENT, URETER;  Surgeon: Luis Hussein MD;  Location: [facility] OR;  Service: Urology;  Laterality: Left;        Family Hx   Family History   Problem Relation Name Age of Onset    Colon cancer Paternal Grandmother      Cancer Paternal Grandmother      Breast cancer Paternal Grandmother      Colon cancer Maternal Grandfather      Cancer Maternal Grandfather      Hypertension Father      Breast cancer Mother  57    Hypertension Mother      Cancer Mother      No Known Problems Brother      No Known Problems Brother      Depression Daughter      Depression Daughter      No Known Problems Daughter      No Known Problems Daughter      Breast cancer Paternal Aunt      Cancer Paternal Aunt      Ovarian cancer Neg Hx          Social Hx   Social History     Socioeconomic History    Marital status:    Tobacco Use    Smoking status: Never    Smokeless tobacco: Never   Substance and Sexual Activity    Alcohol use: Yes     Comment: Social    Drug use: No    Sexual activity: Yes     Partners: Male     Birth control/protection: Partner-Vasectomy     Comment: :  Ha   Social History Narrative    Four girls, one accepted to [employer]     Social Drivers of Health     Financial Resource Strain: Low Risk  (6/11/2024)    Overall Financial Resource Strain (CARDIA)     Difficulty of Paying Living Expenses: Not hard at all   Food Insecurity: No Food Insecurity (6/11/2024)    Hunger Vital Sign     Worried About Running Out of " Food in the Last Year: Never true     Ran Out of Food in the Last Year: Never true   Transportation Needs: No Transportation Needs (9/12/2023)    PRAPARE - Transportation     Lack of Transportation (Medical): No     Lack of Transportation (Non-Medical): No   Physical Activity: Inactive (6/11/2024)    Exercise Vital Sign     Days of Exercise per Week: 0 days     Minutes of Exercise per Session: 0 min   Stress: Stress Concern Present (6/11/2024)    [employer] [place] [employer] - Occupational Stress Questionnaire     Feeling of Stress : To some extent   Housing Stability: High Risk (9/12/2023)    Housing Stability Vital Sign     Unable to Pay for Housing in the Last Year: Yes     Number of Places Lived in the Last Year: 1     Unstable Housing in the Last Year: No        Vital Signs   Vitals:    11/14/24 0218 11/14/24 0634 11/14/24 0807 11/14/24 0851   BP:  125/82 (!) 158/98    BP Location:   Right arm    Patient Position:   Sitting    Pulse:  60 66    Resp: 16  18 18   Temp:  98.1 °F (36.7 °C) 98 °F (36.7 °C)    TempSrc:  Oral Oral    SpO2:  98% 97%    Weight:       Height:            Review of Systems  Per HPI and interval history.    Brief Physical Exam/Reassessment   Constitutional:       General: She is not in acute distress.     Appearance: Normal appearance. She is not toxic-appearing or diaphoretic.   HENT:      Head: Normocephalic and atraumatic.      Mouth/Throat:      Mouth: Mucous membranes are moist.   Eyes:      General: No scleral icterus.     Extraocular Movements: Extraocular movements intact.      Conjunctiva/sclera: Conjunctivae normal.      Pupils: Pupils are equal, round, and reactive to light.   Cardiovascular:      Rate and Rhythm: Normal rate and regular rhythm.      Pulses: Normal pulses.      Heart sounds: Normal heart sounds.   Pulmonary:      Effort: Pulmonary effort is normal. No respiratory distress.      Breath sounds: Normal breath sounds. No wheezing or rales.   Abdominal:       General: Abdomen is flat. There is no distension.      Palpations: Abdomen is soft.      Tenderness: There is no abdominal tenderness.      Comments: No CVA tenderness.  Musculoskeletal:         General: No swelling or tenderness. Normal range of motion.      Cervical back: Normal range of motion and neck supple. No rigidity or tenderness.   Skin:     General: Skin is warm and dry.   Neurological:      Mental Status: She is alert and oriented to person, place, and time. Mental status is at baseline.      Sensory: No sensory deficit.      Motor: No weakness.     Labs/Imaging   Labs Reviewed   CBC W/ AUTO DIFFERENTIAL - Abnormal       Result Value    WBC 13.64 (*)     RBC 4.32      Hemoglobin 12.7      Hematocrit 38.9      MCV 90      MCH 29.4      MCHC 32.6      RDW 13.2      Platelets 319      MPV 10.3      Immature Granulocytes 0.4      Gran # (ANC) 10.2 (*)     Immature Grans (Abs) 0.05 (*)     Lymph # 2.3      Mono # 0.8      Eos # 0.3      Baso # 0.06      nRBC 0      Gran % 74.5 (*)     Lymph % 16.7 (*)     Mono % 5.6      Eosinophil % 2.4      Basophil % 0.4      Differential Method Automated     COMPREHENSIVE METABOLIC PANEL - Abnormal    Sodium 136      Potassium 3.9      Chloride 106      CO2 22 (*)     Glucose 101      BUN 10      Creatinine 0.7      Calcium 9.3      Total Protein 7.1      Albumin 3.6      Total Bilirubin 0.4      Alkaline Phosphatase 82      AST 13      ALT 18      eGFR >60.0      Anion Gap 8     URINALYSIS, REFLEX TO URINE CULTURE - Abnormal    Specimen UA Urine, Clean Catch      Color, UA Colorless (*)     Appearance, UA Hazy (*)     pH, UA 7.0      Specific Gravity, UA 1.005      Protein, UA 1+ (*)     Glucose, UA Negative      Ketones, UA Negative      Bilirubin (UA) Negative      Occult Blood UA Trace (*)     Nitrite, UA Negative      Leukocytes, UA 3+ (*)     Narrative:     Specimen Source->Urine   URINALYSIS MICROSCOPIC - Abnormal    RBC, UA 4      WBC, UA 83 (*)     Bacteria  Many (*)     Squam Epithel, UA 2      Hyaline Casts, UA 0      Microscopic Comment SEE COMMENT      Narrative:     Specimen Source->Urine   BASIC METABOLIC PANEL - Abnormal    Sodium 138      Potassium 4.1      Chloride 106      CO2 25      Glucose 94      BUN 9      Creatinine 0.8      Calcium 8.9      Anion Gap 7 (*)     eGFR >60.0     ISTAT PROCEDURE - Abnormal    POC Glucose 105      POC BUN 10      POC Creatinine 0.7      POC Sodium 137      POC Potassium 3.8      POC Chloride 104      POC TCO2 (MEASURED) 21 (*)     POC Ionized Calcium 1.18      POC Hematocrit 37      Sample PB     ISTAT PROCEDURE - Abnormal    POC PH 7.410      POC PCO2 29.2 (*)     POC PO2 47      POC HCO3 18.5 (*)     POC BE -6 (*)     POC SATURATED O2 84      POC Sodium 139      POC Potassium 3.2 (*)     POC TCO2 19 (*)     POC Ionized Calcium 0.95 (*)     POC Hematocrit 33 (*)     Sample VENOUS      Site Other      Allens Test N/A      DelSys Room Air     CULTURE, BLOOD    Blood Culture, Routine No Growth to date      Narrative:     Aerobic and anaerobic   CULTURE, BLOOD    Blood Culture, Routine No Growth to date      Narrative:     Aerobic and anaerobic   CULTURE, URINE   HEPATITIS C ANTIBODY    Hepatitis C Ab Non-reactive      Narrative:     Release to patient->Immediate   HIV 1 / 2 ANTIBODY    HIV 1/2 Ag/Ab Non-reactive      Narrative:     Release to patient->Immediate   LIPASE    Lipase 15     CBC W/ AUTO DIFFERENTIAL    WBC 9.69      RBC 4.18      Hemoglobin 12.5      Hematocrit 37.6      MCV 90      MCH 29.9      MCHC 33.2      RDW 13.2      Platelets 294      MPV 10.3      Immature Granulocytes 0.4      Gran # (ANC) 6.4      Immature Grans (Abs) 0.04      Lymph # 2.1      Mono # 0.7      Eos # 0.4      Baso # 0.06      nRBC 0      Gran % 65.9      Lymph % 21.7      Mono % 7.6      Eosinophil % 3.8      Basophil % 0.6      Differential Method Automated     POCT URINE PREGNANCY    POC Preg Test, Ur Negative        Acceptable Yes     ISTAT LACTATE    POC Lactate 0.74      Sample VENOUS      Site Other      Allens Test N/A      DelSys Room Air     ISTAT CHEM8      Imaging Results               CT Abdomen Pelvis With IV Contrast NO Oral Contrast (Final result)  Result time 11/13/24 19:48:28      Final result by Tono Cleaning MD (11/13/24 19:48:28)                   Impression:      1. There is minimal asymmetric wall thickening involving the right renal pelvis and right ureter with minimal periureteral stranding may be associated with urinary tract infection on the right.  Recommend clinical correlation and follow-up.  2. Status post cholecystectomy with mild dilation of the intrahepatic and extrahepatic biliary ducts, new from the prior study.  This could be a postop change and no obstruction is detected.  ERCP may be helpful if clinically indicated.  3. Hepatosplenomegaly.  4. 3.5 cm simple right ovarian cyst and 1.7 cm minimally complex probable involuting left ovarian cyst.  5. Diverticulosis of the colon.  6. Possible constipation.  7. See above comments also.  8. This report was flagged in Epic as abnormal.      Electronically signed by: Tono Cleaning  Date:    11/13/2024  Time:    19:48               Narrative:    EXAMINATION:  CT ABDOMEN PELVIS WITH IV CONTRAST    CLINICAL HISTORY:  Abdominal abscess/infection suspected;RLQ abdominal pain (Age >= 14y);    TECHNIQUE:  Low dose axial images, sagittal and coronal reformations were obtained from the lung bases to the pubic symphysis following the IV administration of 100 mL of Omnipaque 350 .  Oral contrast was not administered.    COMPARISON:  08/18/2023    FINDINGS:  Abdomen:    - Lower thorax:    - Lung bases: No infiltrates and no nodules.    - Liver: Liver is enlarged.  Small hepatic cyst in the left lobe.    - Gallbladder: Status post cholecystectomy.    - Bile Ducts: Mild dilation of the intrahepatic and extrahepatic biliary ducts may be a postoperative change.   No obstruction is detected.  Dilated ducts are new from the prior study.  Common duct measures approximately 11 mm.  ERCP may be helpful if clinically indicated.    - Spleen: Spleen is mildly enlarged no focal abnormality.    - Kidneys: No stone, mass, hydronephrosis or hydroureter bilaterally.  Kidneys enhance normally bilaterally.  There is minimal wall thickening involving the right renal pelvis and right ureter with minimal periureteral stranding may be associated with UTI on the right.  Recommend clinical correlation.    - Adrenals: Subcentimeter left adrenal nodule on the left, likely a small adenoma.  No significant change.    - Pancreas: No mass or peripancreatic fat stranding.    - Retroperitoneum:  No significant adenopathy.    - Vascular: No abdominal aortic aneurysm.    - Abdominal wall:  Small fat containing umbilical hernia.    Pelvis:    Urinary bladder is within normal limits.    The uterus is midline.    3.5 cm simple right ovarian cyst.  1.7 cm minimally complex probable involuting left ovarian cyst.    Bowel/Mesentery:    No evidence of bowel obstruction or inflammation.  There is diverticulosis of the colon.  No evidence of acute diverticulitis.  Retained feces in the colon, predominantly on the right.    Bones:  No acute osseous abnormality and no suspicious lytic or blastic lesion.                                       I reviewed all labs, imaging reports and EKGs (if performed) associated with this ED/EDOU visit.    Plan   Pyelonephritis  -Reports having dysuria 2 weeks ago. Initially prescribed Bactrim which UCx showed resistance. Switched to Macrobid by her Urologist and had improvement in her symptoms. Now presenting with R flank pain.  -UA consistent with UTI. CT ab/pelv showing findings concerning for R sided pyelonephritis. No obstructive uropathy.  -Labs significant for  WBC 13.6k. Crt normal 0.7. VBG pH normal and Lactate normal. BCx ordered and in process.   -Received Rocephin 1g in  the ED. Will continue daily with plans on discharging on Vantin. Will also dc on short course of pyridium for dysuria. She does not have impaired renal function.  -PRN Analgesics and anti-emetics ordered.  -Outpatient follow-up with her Urologist     I have discussed this case with ARABELLA Lin.

## 2024-11-15 ENCOUNTER — TELEPHONE (OUTPATIENT)
Dept: UROLOGY | Facility: CLINIC | Age: 44
End: 2024-11-15
Payer: COMMERCIAL

## 2024-11-15 ENCOUNTER — PATIENT OUTREACH (OUTPATIENT)
Dept: ADMINISTRATIVE | Facility: CLINIC | Age: 44
End: 2024-11-15
Payer: COMMERCIAL

## 2024-11-15 NOTE — TELEPHONE ENCOUNTER
Called pt to follow up after ER visit. Pt informed that she was told to follow up with Dr. Hussein next week. Scheduled pt for 11/19/24 @ 10:40 am. Pt voiced understanding

## 2024-11-16 LAB — BACTERIA UR CULT: ABNORMAL

## 2024-11-18 LAB
BACTERIA BLD CULT: NORMAL
BACTERIA BLD CULT: NORMAL

## 2024-11-19 ENCOUNTER — OFFICE VISIT (OUTPATIENT)
Dept: UROLOGY | Facility: CLINIC | Age: 44
End: 2024-11-19
Payer: COMMERCIAL

## 2024-11-19 VITALS
HEART RATE: 71 BPM | SYSTOLIC BLOOD PRESSURE: 132 MMHG | DIASTOLIC BLOOD PRESSURE: 92 MMHG | BODY MASS INDEX: 46.89 KG/M2 | WEIGHT: 273.13 LBS

## 2024-11-19 DIAGNOSIS — N39.0 RECURRENT UTI: Primary | ICD-10-CM

## 2024-11-19 PROCEDURE — 1159F MED LIST DOCD IN RCRD: CPT | Mod: CPTII,S$GLB,, | Performed by: UROLOGY

## 2024-11-19 PROCEDURE — 99999 PR PBB SHADOW E&M-EST. PATIENT-LVL III: CPT | Mod: PBBFAC,,, | Performed by: UROLOGY

## 2024-11-19 PROCEDURE — 3080F DIAST BP >= 90 MM HG: CPT | Mod: CPTII,S$GLB,, | Performed by: UROLOGY

## 2024-11-19 PROCEDURE — 4010F ACE/ARB THERAPY RXD/TAKEN: CPT | Mod: CPTII,S$GLB,, | Performed by: UROLOGY

## 2024-11-19 PROCEDURE — 99214 OFFICE O/P EST MOD 30 MIN: CPT | Mod: S$GLB,,, | Performed by: UROLOGY

## 2024-11-19 PROCEDURE — 3044F HG A1C LEVEL LT 7.0%: CPT | Mod: CPTII,S$GLB,, | Performed by: UROLOGY

## 2024-11-19 PROCEDURE — 3008F BODY MASS INDEX DOCD: CPT | Mod: CPTII,S$GLB,, | Performed by: UROLOGY

## 2024-11-19 PROCEDURE — 3075F SYST BP GE 130 - 139MM HG: CPT | Mod: CPTII,S$GLB,, | Performed by: UROLOGY

## 2024-11-19 RX ORDER — NITROFURANTOIN 25; 75 MG/1; MG/1
100 CAPSULE ORAL NIGHTLY
Qty: 90 CAPSULE | Refills: 0 | Status: SHIPPED | OUTPATIENT
Start: 2024-11-19 | End: 2025-02-17

## 2024-11-19 NOTE — PROGRESS NOTES
[facility] Department of Urology      Return Recurrent Urinary Tract Infections Note    11/19/2024    Referred by:  No ref. provider found    History of Present Illness    CHIEF COMPLAINT:  Ms. Gonzalez presents today for recent hospitalization for APN.    URINARY TRACT INFECTION:  She has had two infections over the last 1-2 months. She experiences pain during urination, as well as pressure and urgency to urinate, often with minimal output. She had a virtual visit on Sunday and was prescribed a 3-day course of Bactrim. She has been taking the medication as prescribed, with one dose on Sunday night, two doses on Monday, and one dose on Tuesday morning. She notes that while symptoms have not worsened, they have also not improved since starting the antibiotic treatment. She expresses uncertainty about the effectiveness of the short course, as she has not previously been prescribed such a brief antibiotic regimen for UTIs.    MEDICAL HISTORY:  She has a history of urolithiasis. Recent upper tract imaging showed no stones but periureteral inflammation suggestive of upper tract infection.     LABS:  Today's urinalysis shows trace blood, trace leukocyte esterase, and positive nitrates. I obtained a catheterized specimen for urine culture today.       ROS:  Genitourinary: reports frequency, reports urgency, reports painful urination          A review of 10+ systems was conducted with pertinent positive and negative findings documented in HPI with all other systems reviewed and negative.    Past medical, family, surgical and social history reviewed as documented in chart with pertinent positive medical, family, surgical and social history detailed in HPI.    Exam Findings:    Physical Exam    General: No acute distress. Nontoxic appearing.  HENT: Normocephalic. Atraumatic.  Respiratory: Normal respiratory effort. No conversational dyspnea. No audible wheezing.  Abdomen: No obvious distension.  Skin: No visible  abnormalities.  Extremities: No edema upper extremities. No edema lower extremities.  Neurological: Alert and oriented x3. Normal speech.  Psychiatric: Normal mood. Normal affect. No evidence of SI.  Female Genitourinary: Female genitourinary system appears normal.          Assessment/Plan:    Assessment & Plan    - Suspected UTI based on patient's symptoms, considering possibility of kidney stone due to history of urolithiasis  - Will obtain catheterized urine specimen for culture to determine appropriate treatment and rule out resistant bacteria  - Ordered renal ultrasound to check for kidney swelling, which could indicate an obstructing stone  - Opted to wait for culture results before prescribing additional antibiotics  - Considered possibility that recent antibiotic use for tonsillitis may have increased susceptibility to UTI    URINARY TRACT INFECTION (UTI):  - Explained that recent antibiotic use for tonsillitis may have increased susceptibility to UTI by disrupting normal healthy bacteria.  - Plan to begin low dose prophylaxis and D-mannose  - Cystoscopy to evaluate    RENAL EVALUATION:  - Renal ultrasound will follow cystoscopy.    FOLLOW UP:  - Will message patient with results of urine culture and ultrasound.  - Contact the office if symptoms worsen or do not improve.

## 2024-11-22 NOTE — DISCHARGE SUMMARY
ED Observation Unit  Discharge Summary        11/14/2024    History of Present Illness:    Rubina Gonzalez is a 44 y.o. female with medical history of HTN, Nephrolithiasis presenting to the ED with the chief complaint of flank pain. Had a telehealth visit on 11/03 for dysuria and prescribed Bactrim. Followed up with Urology, urine culture ordered which showed resistance to Bactrim. She was started on Macrobid. She also had a renal ultrasound that showed nonobstructing stones, no hydro or obstructive uropathy. She completed Macrobid yesterday. She has had progressive right flank pain that is radiating down to the right groin the past several days. Also having bladder spasms/pain after urination as well as frequent urination. No fevers but has had chills today. Feels nauseated, no vomiting. Normal bowel movements. Took a leave prior to arrival without relief.      In the ED, labs significant for WBC 13.6k, Crt 0.7, UA consistent with UTI. CT ab/pelv showing R renal pelvis and ureter minimal wall thickening concerning for pyelonephritis. No obstructive stone. She received Zofran 4mg IV, Morphine 6mg IV x2, Rocephin 1g IV x1.      Patient was placed in the ED Observation Unit for Pyelonephritis    Observation Course:    Patient placed in the emergency department observation unit for pyelonephritis.  She received IV Rocephin during her ED OU stay.  Her white blood cell count improved from 13.64 K to 9.69 K. creatinine remains stable at 0.8.  Nothing of acute concern on her vital signs.  She remained afebrile, nontoxic and nondistressed.  She reported mild dysuria which resolved with Pyridium.  Abdomen remained benign and the CVA tenderness resolved.  Plan to discharge home on Vantin with urology follow-up.  Will give a short course of Pyridium for p.r.n. use.    Consultants:    None    Final Diagnosis:  Pyelonephritis    Discharge Condition: Good    Disposition: Home or Self Care     Time spent on the discharge of the  patient including review of hospital course with the patient. reviewing discharge medications and arranging follow-up care 35 minutes.  Patient was seen and examined on the date of discharge and determined to be suitable for discharge.    Follow Up:  Future Appointments   Date Time Provider Department Center   11/27/2024  3:00 PM Eliel Lopez MD [facility] [place]   12/3/2024  2:00 PM [facility] UROLOGY PROCEDURE 1 [facility] UROPRO [place]   12/16/2024  4:00 PM [facility] US2 [facility] ULTRA [place]   2/20/2025 10:30 AM Nguyễn Barbosa MD [facility] OBGYN [place]   5/14/2025 10:00 AM [facility] SANJAY [place]   8/18/2025  2:00 PM Yohana Richmond, NP [facility] IMANI Pérez

## 2024-11-27 ENCOUNTER — OFFICE VISIT (OUTPATIENT)
Dept: INTERNAL MEDICINE | Facility: CLINIC | Age: 44
End: 2024-11-27
Payer: COMMERCIAL

## 2024-11-27 VITALS
HEIGHT: 64 IN | OXYGEN SATURATION: 97 % | RESPIRATION RATE: 16 BRPM | HEART RATE: 77 BPM | SYSTOLIC BLOOD PRESSURE: 150 MMHG | BODY MASS INDEX: 46.7 KG/M2 | DIASTOLIC BLOOD PRESSURE: 80 MMHG | WEIGHT: 273.56 LBS | TEMPERATURE: 98 F

## 2024-11-27 DIAGNOSIS — E66.813 CLASS 3 SEVERE OBESITY WITH BODY MASS INDEX (BMI) OF 45.0 TO 49.9 IN ADULT, UNSPECIFIED OBESITY TYPE, UNSPECIFIED WHETHER SERIOUS COMORBIDITY PRESENT: ICD-10-CM

## 2024-11-27 DIAGNOSIS — E66.01 CLASS 3 SEVERE OBESITY WITH BODY MASS INDEX (BMI) OF 45.0 TO 49.9 IN ADULT, UNSPECIFIED OBESITY TYPE, UNSPECIFIED WHETHER SERIOUS COMORBIDITY PRESENT: ICD-10-CM

## 2024-11-27 DIAGNOSIS — I10 PRIMARY HYPERTENSION: Primary | ICD-10-CM

## 2024-11-27 DIAGNOSIS — G89.4 CHRONIC PAIN SYNDROME: ICD-10-CM

## 2024-11-27 PROCEDURE — 99999 PR PBB SHADOW E&M-EST. PATIENT-LVL III: CPT | Mod: PBBFAC,,, | Performed by: STUDENT IN AN ORGANIZED HEALTH CARE EDUCATION/TRAINING PROGRAM

## 2024-11-27 RX ORDER — SPIRONOLACTONE 50 MG/1
50 TABLET, FILM COATED ORAL DAILY
Qty: 90 TABLET | Refills: 3 | Status: SHIPPED | OUTPATIENT
Start: 2024-11-27 | End: 2025-11-27

## 2024-11-27 RX ORDER — SEMAGLUTIDE 0.25 MG/.5ML
0.25 INJECTION, SOLUTION SUBCUTANEOUS
Qty: 6 ML | Refills: 3 | Status: SHIPPED | OUTPATIENT
Start: 2024-11-27 | End: 2025-11-27

## 2024-11-27 NOTE — PROGRESS NOTES
Subjective:    The following note was written in combination with deep-scribe software and dictation.      Chief Complaint: Follow-up    HPI   Ms.Kristine Gonzalez is a 44-year-old woman with pertinent medical history including anxiety/depression (off of Lexapro 10 for a few months and Xanax 1 b.i.d. p.r.n.) & hypertension (losartan 25) presenting for hypertensive follow-up;     Hypertension:  - at last appointment losartan was continued but switched to q.h.s. while adding spironolactone 25 q.a.m. for combination antihypertensive and peripheral testosterone blocking effect (reported acne has appointment)  - had noticed a slight improvement in acne combination systolic/diastolic reduction (albeit insufficient)    Review of Systems   Constitutional:  Negative for appetite change, chills and fever.   HENT: Negative.     Respiratory:  Negative for cough, chest tightness and shortness of breath.    Cardiovascular:  Negative for chest pain, palpitations and leg swelling.   Gastrointestinal:  Negative for abdominal distention, abdominal pain, blood in stool, constipation, diarrhea, nausea and vomiting.   Endocrine: Negative.    Genitourinary:  Negative for difficulty urinating, dysuria, frequency and hematuria.   Musculoskeletal: Negative.    Integumentary:  Negative.   Neurological: Negative.    Psychiatric/Behavioral: Negative.           Objective:      Vitals:    11/27/24 1454   BP: (!) 148/78   Pulse: 77   Resp: 16   Temp: 97.6 °F (36.4 °C)      Physical Exam  Vitals reviewed.   Constitutional:       General: She is not in acute distress.     Appearance: Normal appearance.   HENT:      Head: Normocephalic and atraumatic.      Comments: Facial features are symmetric      Nose: Nose normal. No congestion or rhinorrhea.      Mouth/Throat:      Mouth: Mucous membranes are moist.      Pharynx: Oropharynx is clear. No oropharyngeal exudate or posterior oropharyngeal erythema.   Eyes:      General: No scleral icterus.      Extraocular Movements: Extraocular movements intact.      Conjunctiva/sclera: Conjunctivae normal.   Cardiovascular:      Rate and Rhythm: Normal rate and regular rhythm.      Pulses: Normal pulses.      Heart sounds: Normal heart sounds.   Pulmonary:      Effort: Pulmonary effort is normal. No respiratory distress.      Breath sounds: Normal breath sounds.   Musculoskeletal:         General: No deformity or signs of injury. Normal range of motion.      Cervical back: Normal range of motion.      Comments: Gait normal    Skin:     General: Skin is warm and dry.      Findings: No rash.   Neurological:      General: No focal deficit present.      Mental Status: She is alert and oriented to person, place, and time. Mental status is at baseline.   Psychiatric:         Mood and Affect: Mood normal.         Behavior: Behavior normal.         Thought Content: Thought content normal.       Current Outpatient Medications on File Prior to Visit   Medication Sig Dispense Refill    ALPRAZolam (XANAX) 1 MG tablet Take 1 tablet (1 mg total) by mouth 2 (two) times daily. 30 tablet 0    clotrimazole-betamethasone 1-0.05% (LOTRISONE) cream Apply topically 2 (two) times daily. 15 g 1    LINZESS 72 mcg Cap capsule Take 1 capsule (72 mcg total) by mouth before breakfast. 90 capsule 1    losartan (COZAAR) 25 MG tablet Take 1 tablet (25 mg total) by mouth once daily. 90 tablet 2    nitrofurantoin, macrocrystal-monohydrate, (MACROBID) 100 MG capsule Take 1 capsule (100 mg total) by mouth every evening. 90 capsule 0    valACYclovir (VALTREX) 1000 MG tablet Take 1 tablet by mouth twice daily for 10 days at earliest sign of symptoms 40 tablet 1    [DISCONTINUED] spironolactone (ALDACTONE) 25 MG tablet Take 1 tablet (25 mg total) by mouth once daily. 90 tablet 3    budesonide-formoterol 160-4.5 mcg (SYMBICORT) 160-4.5 mcg/actuation HFAA Inhale 2 puffs into the lungs 2 (two) times daily. (Patient not taking: Reported on 11/27/2024)       budesonide-formoterol 160-4.5 mcg (SYMBICORT) 160-4.5 mcg/actuation HFAA Inhale 2 puffs into the lungs 2 (two) times daily 10.2 g 0    budesonide-glycopyr-formoterol (BREZTRI AEROSPHERE) 160-9-4.8 mcg/actuation HFAA Inhale 2 puffs into the lungs 2 (two) times a day 32.1 g 3    cefpodoxime (VANTIN) 200 MG tablet Take 1 tablet (200 mg total) by mouth 2 (two) times daily. (Patient not taking: Reported on 11/27/2024) 20 tablet 0    diazePAM (VALIUM) 2 MG tablet Take 1 tablet (2 mg total) by mouth On call Procedure for Anxiety. (Patient not taking: Reported on 11/27/2024) 2 tablet 0    mometasone (ELOCON) 0.1 % ointment Apply twice a day to rash as needed 45 g 1    montelukast (SINGULAIR) 10 mg tablet Take 10 mg by mouth every evening.      phenazopyridine (PYRIDIUM) 200 MG tablet Take 1 tablet (200 mg total) by mouth 3 (three) times daily as needed for Pain. (Patient not taking: Reported on 11/27/2024) 5 tablet 0    predniSONE (DELTASONE) 10 MG tablet Take 3 tablets (30mg total) by mouth once daily on Days 1-2, THEN 2 tablets (20mg total) daily on days 3-4, THEN 1 tablet (10mg total) daily on days 5-6 12 tablet 0    predniSONE (DELTASONE) 20 MG tablet Take 1 tablet (20 mg total) by mouth once daily. 5 tablet 0    promethazine-codeine 6.25-10 mg/5 ml (PHENERGAN WITH CODEINE) 6.25-10 mg/5 mL syrup Take 5-10 mLs by mouth 3 (three) times daily as needed FOR COUGH (Patient not taking: Reported on 11/27/2024) 180 mL 0    traZODone (DESYREL) 100 MG tablet Take 1 tablet (100 mg total) by mouth every evening. 90 tablet 3     No current facility-administered medications on file prior to visit.         Assessment:       1. Primary hypertension    2. Class 3 severe obesity with body mass index (BMI) of 45.0 to 49.9 in adult, unspecified obesity type, unspecified whether serious comorbidity present    3. Chronic pain syndrome        Plan:       Primary hypertension  Class 3 severe obesity with body mass index (BMI) of 45.0 to 49.9  in adult, unspecified obesity type, unspecified whether serious comorbidity present  Chronic pain syndrome  -     semaglutide, weight loss, (WEGOVY) 0.25 mg/0.5 mL PnIj; Inject 0.25 mg into the skin every 7 days.  Dispense: 6 mL; Refill: 3   - in an effort to assist in weight loss to indirectly reduce resistant hypertension &reduce chronic musculoskeletal pain, will attempt have generic we go approved while titrating antihypertensives as below   - increase spironolactone from 25-50 mg and follow-up in approximately 2 months    Other orders  -     spironolactone (ALDACTONE) 50 MG tablet; Take 1 tablet (50 mg total) by mouth once daily.  Dispense: 90 tablet; Refill: 3

## 2024-12-03 ENCOUNTER — PROCEDURE VISIT (OUTPATIENT)
Facility: CLINIC | Age: 44
End: 2024-12-03
Payer: COMMERCIAL

## 2024-12-03 VITALS
HEART RATE: 87 BPM | RESPIRATION RATE: 17 BRPM | BODY MASS INDEX: 47.34 KG/M2 | WEIGHT: 275.81 LBS | SYSTOLIC BLOOD PRESSURE: 156 MMHG | DIASTOLIC BLOOD PRESSURE: 92 MMHG | TEMPERATURE: 98 F

## 2024-12-03 DIAGNOSIS — N39.0 RECURRENT UTI: ICD-10-CM

## 2024-12-03 PROCEDURE — 52000 CYSTOURETHROSCOPY: CPT | Mod: S$GLB,,, | Performed by: UROLOGY

## 2024-12-03 RX ORDER — LIDOCAINE HYDROCHLORIDE 20 MG/ML
JELLY TOPICAL
Status: COMPLETED | OUTPATIENT
Start: 2024-12-03 | End: 2024-12-03

## 2024-12-03 RX ADMIN — LIDOCAINE HYDROCHLORIDE: 20 JELLY TOPICAL at 02:12

## 2024-12-03 NOTE — PATIENT INSTRUCTIONS
What to Expect After a Cystoscopy  For the next 24-48 hours, you may feel a mild burning when you urinate. This burning is normal and expected. Drink plenty of water to dilute the urine to help relieve the burning sensation. You may also see a small amount of blood in your urine after the procedure.    Unless you are already taking antibiotics, you may be given an antibiotic after the test to prevent infection.    Signs and Symptoms to Report  Call the [facility] at [phone] if you develop any of the following:  Fever of 101 degrees or higher  Chills or persistent bleeding  Inability to urinate

## 2024-12-03 NOTE — PROCEDURES
Office Cystourethroscopy Note    Date: 12/3/2024   Referring Provider: Luis Hussein MD     Reason for Cystoscopy: Recurrent UTI    Upper Tract Evaluation:   Stone Protocol CT:  There is minimal asymmetric wall thickening involving the right renal pelvis and right ureter with minimal periureteral stranding may be associated with urinary tract infection on the right.  Recommend clinical correlation and follow-up.    Procedure Details: Informed consent was obtained and she was sterily prepped and 1% lidocaine jelly was injected per urethra. A flexible cystoscope was inserted into the bladder via the urethra. There was no evidence of stricture, stenosis, lesions, other obstruction, or diverticulum. Significant findings included a normal unobstructed urethra only. Cystoscopic examination of the bladder revealed orthotopically positioned, normal bilateral ureteral orifices with clear yellow urine effluxing from each orifice. All mucosal surfaces were examined with no apparent stones, tumors, foreign bodies, erythema, trabeculation, diverticula, or ulcers. The procedure was concluded without complications. The patient was not administered a post-procedure antibiotic.     Specimens: No Specimens    Findings: Normal bladder and urethra    Other Findings:  PVR - 0 mL    Pelvic Exam:  Vaginal Mucosa: normal  Anterior: none  Apical: no apical descent  Posterior: none  Urethral Lesions: no lesions or masses     Assesment and Plan:   Recurrent Urinary Tract Infections: No primary bladder pathology identified today to explain her recurrent urinary tract infections. We have recommended continued treatment discussed at last clinic visit. Continue D-mannose at least 3 months. Repeat renal U/S as scheduled.

## 2024-12-16 ENCOUNTER — HOSPITAL ENCOUNTER (OUTPATIENT)
Dept: RADIOLOGY | Facility: HOSPITAL | Age: 44
Discharge: HOME | End: 2024-12-16
Attending: UROLOGY
Payer: COMMERCIAL

## 2024-12-16 DIAGNOSIS — N39.0 RECURRENT UTI: ICD-10-CM

## 2024-12-16 PROCEDURE — 76770 US EXAM ABDO BACK WALL COMP: CPT | Mod: 26,,, | Performed by: RADIOLOGY

## 2024-12-16 PROCEDURE — 76770 US EXAM ABDO BACK WALL COMP: CPT | Mod: TC

## 2024-12-17 ENCOUNTER — PATIENT MESSAGE (OUTPATIENT)
Dept: UROLOGY | Facility: CLINIC | Age: 44
End: 2024-12-17
Payer: COMMERCIAL

## 2025-01-10 ENCOUNTER — PATIENT MESSAGE (OUTPATIENT)
Dept: OBSTETRICS AND GYNECOLOGY | Facility: CLINIC | Age: 45
End: 2025-01-10
Payer: COMMERCIAL

## 2025-01-13 ENCOUNTER — OFFICE VISIT (OUTPATIENT)
Dept: OBSTETRICS AND GYNECOLOGY | Facility: CLINIC | Age: 45
End: 2025-01-13
Payer: COMMERCIAL

## 2025-01-13 VITALS — BODY MASS INDEX: 46.67 KG/M2 | HEIGHT: 64 IN | WEIGHT: 273.38 LBS

## 2025-01-13 DIAGNOSIS — N76.4 VULVAR ABSCESS: ICD-10-CM

## 2025-01-13 DIAGNOSIS — L73.2 HYDRADENITIS: Primary | ICD-10-CM

## 2025-01-13 PROCEDURE — 99214 OFFICE O/P EST MOD 30 MIN: CPT | Mod: S$GLB,,, | Performed by: OBSTETRICS & GYNECOLOGY

## 2025-01-13 PROCEDURE — 1159F MED LIST DOCD IN RCRD: CPT | Mod: CPTII,S$GLB,, | Performed by: OBSTETRICS & GYNECOLOGY

## 2025-01-13 PROCEDURE — 99999 PR PBB SHADOW E&M-EST. PATIENT-LVL III: CPT | Mod: PBBFAC,,, | Performed by: OBSTETRICS & GYNECOLOGY

## 2025-01-13 PROCEDURE — 3008F BODY MASS INDEX DOCD: CPT | Mod: CPTII,S$GLB,, | Performed by: OBSTETRICS & GYNECOLOGY

## 2025-01-13 RX ORDER — DOXYCYCLINE 100 MG/1
100 CAPSULE ORAL EVERY 12 HOURS
Qty: 50 CAPSULE | Refills: 0 | Status: SHIPPED | OUTPATIENT
Start: 2025-01-13

## 2025-01-13 NOTE — PROGRESS NOTES
"Subjective:       Patient ID: Rubina Gonzalez is a 44 y.o. female.    Chief Complaint:  Labial cysts      History of Present Illness   45 y/o with one week history of 4 boils 2 on right labia majora/vulva and 2 on leg right and left sides  They drained and improved over the weekend   Nothing under arms - discussed hydradenitis as possibility but never has had this before  No feer/ chills   On macrobid for UTI    GYN & OB History  No LMP recorded. Patient has had an ablation.   Date of Last Pap: 8/7/2024     Objective:     There were no vitals filed for this visit.    Physical Exam:   Constitutional: She appears well-developed and well-nourished.               Genitourinary:    Vagina normal.         The external female genitalia was abnormal.   Vagina exhibits no lesion. No vaginal discharge or tenderness in the vagina.    Genitourinary Comments: 4  1cm non fluctuant cysts under skin - see blue in pic  I&D not done as soft and improving                   Neurological: She is alert.    Skin: Skin is warm.    Psychiatric: She has a normal mood and affect.          Assessment/ Plan:     Orders Placed This Encounter    doxycycline (MONODOX) 100 MG capsule       Rubina Dennis" was seen today for labial cysts.    Diagnoses and all orders for this visit:    Hydradenitis  -     doxycycline (MONODOX) 100 MG capsule; Take 1 capsule (100 mg total) by mouth every 12 (twelve) hours. For 2 weeks then once a day until gone    Vulvar abscess- improved and drained over the weekend   Will tx with doxy for a month and f/u for suspected hydradenitis        Follow up in about 4 weeks (around 2/10/2025), or if symptoms worsen or fail to improve, for medication followup.      Health Maintenance         Date Due Completion Date    TETANUS VACCINE Never done ---    Influenza Vaccine (1) Never done ---    COVID-19 Vaccine (1 - 2024-25 season) Never done ---    Mammogram 05/13/2025 5/13/2024    Hemoglobin A1c (Diabetic Prevention " Screening) 2027    Cervical Cancer Screening 2029    RSV Vaccine (Age 60+ and Pregnant patients) (1 - 1-dose 75+ series) 2056 ---            Answers submitted by the patient for this visit:  Gynecologic Exam Questionnaire  (Submitted on 2025)  Chief Complaint: Gynecologic exam  genital itching: No  genital lesions: Yes  genital odor: No  genital rash: No  missed menses: No  pelvic pain: No  vaginal bleeding: No  vaginal discharge: No  Chronicity: recurrent  Onset: in the past 7 days  Progression since onset: gradually improving  Pain severity: no pain  Affected side: right  Pregnant now?: No  abdominal pain: No  anorexia: No  back pain: No  chills: No  constipation: No  diarrhea: No  discolored urine: No  dysuria: No  fever: No  flank pain: No  frequency: No  headaches: No  hematuria: No  nausea: No  painful intercourse: No  rash: No  urgency: No  vomiting: No  Sexual activity: sexually active  Partner with STD symptoms: no  Birth control: vasectomy  STD: No  abdominal surgery: Yes   section: No  Ectopic pregnancy: No  Endometriosis: No  herpes simplex: No  gynecological surgery: Yes  menorrhagia: No  metrorrhagia: No  miscarriage: No  ovarian cysts: Yes  perineal abscess: No  PID: No  terminated pregnancy: No  vaginosis: Yes

## 2025-01-20 DIAGNOSIS — K59.00 CONSTIPATION, UNSPECIFIED CONSTIPATION TYPE: ICD-10-CM

## 2025-01-20 RX ORDER — LINACLOTIDE 72 UG/1
72 CAPSULE, GELATIN COATED ORAL
Qty: 90 CAPSULE | Refills: 1 | Status: SHIPPED | OUTPATIENT
Start: 2025-01-20

## 2025-01-27 ENCOUNTER — PATIENT MESSAGE (OUTPATIENT)
Dept: OBSTETRICS AND GYNECOLOGY | Facility: CLINIC | Age: 45
End: 2025-01-27
Payer: COMMERCIAL

## 2025-01-28 RX ORDER — HYDROCORTISONE ACETATE PRAMOXINE HCL 2.5; 1 G/100G; G/100G
CREAM TOPICAL 3 TIMES DAILY
Qty: 30 G | Refills: 0 | Status: SHIPPED | OUTPATIENT
Start: 2025-01-28

## 2025-02-06 ENCOUNTER — TELEPHONE (OUTPATIENT)
Dept: PHARMACY | Facility: CLINIC | Age: 45
End: 2025-02-06
Payer: COMMERCIAL

## 2025-02-07 NOTE — TELEPHONE ENCOUNTER
[facility]/Population Health Chart Review & Patient Outreach Details For Medication Adherence Project    Reason for Outreach Encounter: 3rd Party payor non-compliance report ([employer], [employer], C, etc)  2.  Patient Outreach Method: Reviewed patient chart   3.   Medication in question:    Hypertension Medications               losartan (COZAAR) 25 MG tablet Take 1 tablet (25 mg total) by mouth once daily.    spironolactone (ALDACTONE) 50 MG tablet Take 1 tablet (50 mg total) by mouth once daily.                 Losartan  last filled  11/15/24 for 90 day supply      4.  Reviewed and or Updates Made To: Patient Chart  5. Outreach Outcomes and/or actions taken: Patient filled medication and is on track to be adherent  Additional Notes:

## 2025-02-10 ENCOUNTER — PATIENT OUTREACH (OUTPATIENT)
Dept: ADMINISTRATIVE | Facility: HOSPITAL | Age: 45
End: 2025-02-10
Payer: COMMERCIAL

## 2025-02-20 ENCOUNTER — OFFICE VISIT (OUTPATIENT)
Dept: OBSTETRICS AND GYNECOLOGY | Facility: CLINIC | Age: 45
End: 2025-02-20
Payer: COMMERCIAL

## 2025-02-20 VITALS
BODY MASS INDEX: 46.4 KG/M2 | WEIGHT: 271.81 LBS | SYSTOLIC BLOOD PRESSURE: 136 MMHG | DIASTOLIC BLOOD PRESSURE: 91 MMHG | HEIGHT: 64 IN

## 2025-02-20 DIAGNOSIS — Z11.51 SCREENING FOR HPV (HUMAN PAPILLOMAVIRUS): ICD-10-CM

## 2025-02-20 DIAGNOSIS — Z12.4 SCREENING FOR CERVICAL CANCER: ICD-10-CM

## 2025-02-20 DIAGNOSIS — R87.612 LGSIL ON PAP SMEAR OF CERVIX: Primary | ICD-10-CM

## 2025-02-20 PROCEDURE — 3075F SYST BP GE 130 - 139MM HG: CPT | Mod: CPTII,S$GLB,, | Performed by: OBSTETRICS & GYNECOLOGY

## 2025-02-20 PROCEDURE — 3080F DIAST BP >= 90 MM HG: CPT | Mod: CPTII,S$GLB,, | Performed by: OBSTETRICS & GYNECOLOGY

## 2025-02-20 PROCEDURE — 1159F MED LIST DOCD IN RCRD: CPT | Mod: CPTII,S$GLB,, | Performed by: OBSTETRICS & GYNECOLOGY

## 2025-02-20 PROCEDURE — 99213 OFFICE O/P EST LOW 20 MIN: CPT | Mod: S$GLB,,, | Performed by: OBSTETRICS & GYNECOLOGY

## 2025-02-20 PROCEDURE — 3008F BODY MASS INDEX DOCD: CPT | Mod: CPTII,S$GLB,, | Performed by: OBSTETRICS & GYNECOLOGY

## 2025-02-20 NOTE — PROGRESS NOTES
"Subjective:       Patient ID: Rubina Gonzalez is a 44 y.o. female.    Chief Complaint:  Abnormal Pap Smear (Last pap 8/2024 ASCUS, hpv neg/Colpo 8/2024 normal )      History of Present Illness  Here for repeat pap    Last pap LSGSIL hpv neg   Colpo normal ECC neg       GYN & OB History  No LMP recorded (lmp unknown). Patient has had an ablation.   Date of Last Pap: 2/20/2025       Objective:     Vitals:    02/20/25 1023   BP: (!) 136/91       Physical Exam  PELVIC:   Normal external genitalia without lesions.    Vagina moist and well rugated without lesions or discharge.    Cervix pink, without lesions, discharge or tenderness. No CMT               PAP  performed   Bimanual exam shows uterus to be normal size, regular, mobile and nontender.    Adnexa without masses or tenderness.       Assessment/ Plan:     Orders Placed This Encounter    HPV High Risk Genotypes, PCR    Liquid-Based Pap Smear, Screening       Rubina Dennis" was seen today for abnormal pap smear.    Diagnoses and all orders for this visit:    LGSIL on Pap smear of cervix  -     HPV High Risk Genotypes, PCR  -     Liquid-Based Pap Smear, Screening    Screening for cervical cancer  -     Liquid-Based Pap Smear, Screening    Screening for HPV (human papillomavirus)  -     HPV High Risk Genotypes, PCR        No follow-ups on file.      Health Maintenance         Date Due Completion Date    TETANUS VACCINE Never done ---    Influenza Vaccine (1) Never done ---    COVID-19 Vaccine (1 - 2024-25 season) Never done ---    Mammogram 05/13/2025 5/13/2024    Hemoglobin A1c (Diabetic Prevention Screening) 08/20/2027 8/20/2024    Cervical Cancer Screening 08/01/2029 8/1/2024    RSV Vaccine (Age 60+ and Pregnant patients) (1 - 1-dose 75+ series) 04/12/2056 ---            "

## 2025-02-27 ENCOUNTER — RESULTS FOLLOW-UP (OUTPATIENT)
Dept: OBSTETRICS AND GYNECOLOGY | Facility: CLINIC | Age: 45
End: 2025-02-27

## 2025-03-04 ENCOUNTER — PATIENT MESSAGE (OUTPATIENT)
Dept: OBSTETRICS AND GYNECOLOGY | Facility: CLINIC | Age: 45
End: 2025-03-04
Payer: COMMERCIAL

## 2025-03-09 ENCOUNTER — TELEPHONE (OUTPATIENT)
Dept: PHARMACY | Facility: CLINIC | Age: 45
End: 2025-03-09
Payer: COMMERCIAL

## 2025-03-09 NOTE — TELEPHONE ENCOUNTER
[facility]/Population Health Chart Review & Patient Outreach Details For Medication Adherence Project    Reason for Outreach Encounter: 3rd Party payor non-compliance report ([employer], [employer], [facility], etc)  2.  Patient Outreach Method: Reviewed Patient Chart  3.   Medication in question: losartan   LAST FILLED: 2/27/25 for 90 day supply  Hypertension Medications              losartan (COZAAR) 25 MG tablet Take 1 tablet (25 mg total) by mouth once daily.    spironolactone (ALDACTONE) 50 MG tablet Take 1 tablet (50 mg total) by mouth once daily.              4.  Reviewed and or Updates Made To: Patient Chart  5. Outreach Outcomes and/or actions taken: Patient filled medication and is on track to be adherent

## 2025-03-26 ENCOUNTER — PATIENT MESSAGE (OUTPATIENT)
Dept: INTERNAL MEDICINE | Facility: CLINIC | Age: 45
End: 2025-03-26
Payer: COMMERCIAL

## 2025-03-31 NOTE — PROGRESS NOTES
Subjective:    The following note was written in combination with deep-scribe software and dictation (to which understanding and consent was verbally expressed); please excuse any transcription and/or dictation errors.      Chief Complaint: Follow-up (4 month BP)    HPI  Ms.Kristine Gonzalez is a 44-year-old woman with pertinent medical history including anxiety/depression (previously prescribed Lexapro; Xanax 1 b.i.d. p.r.n.) & hypertension (spironolactone 25 q.a.m. & losartan 25 with dinner) presenting for hypertensive follow-up;      Hypertension:  - losartan 25 changed to q.h.s. while adding spironolactone 25 q.a.m--> 50 for combination antihypertensive and peripheral testosterone blocking effect (reported acne at a prior appointment); home blood pressure control (see below) has been more than adequate, but she has been out of spironolactone for the past 2 weeks leading to persistent in office hypertension.    BMI > 45  -  wegovy denied by ensure                 Review of Systems   Constitutional:  Negative for appetite change, chills and fever.   HENT: Negative.     Respiratory:  Negative for cough, chest tightness and shortness of breath.    Cardiovascular:  Negative for chest pain, palpitations and leg swelling.   Gastrointestinal:  Negative for abdominal distention, abdominal pain, blood in stool, constipation, diarrhea, nausea and vomiting.   Endocrine: Negative.    Genitourinary:  Negative for difficulty urinating, dysuria, frequency and hematuria.   Musculoskeletal: Negative.    Integumentary:  Negative.   Neurological: Negative.    Psychiatric/Behavioral: Negative.           Objective:      Vitals:    04/01/25 1628   BP: 131/81   Pulse:    Resp:    Temp:       Physical Exam  Vitals reviewed.   Constitutional:       General: She is not in acute distress.     Appearance: Normal appearance.   HENT:      Head: Normocephalic and atraumatic.      Comments: Facial features are symmetric      Nose: Nose normal. No  congestion or rhinorrhea.      Mouth/Throat:      Mouth: Mucous membranes are moist.      Pharynx: Oropharynx is clear. No oropharyngeal exudate or posterior oropharyngeal erythema.   Eyes:      General: No scleral icterus.     Extraocular Movements: Extraocular movements intact.      Conjunctiva/sclera: Conjunctivae normal.   Cardiovascular:      Rate and Rhythm: Normal rate and regular rhythm.      Pulses: Normal pulses.      Heart sounds: Normal heart sounds.   Pulmonary:      Effort: Pulmonary effort is normal. No respiratory distress.      Breath sounds: Normal breath sounds.   Musculoskeletal:         General: No deformity or signs of injury. Normal range of motion.      Cervical back: Normal range of motion.   Skin:     General: Skin is warm and dry.      Findings: No rash.   Neurological:      General: No focal deficit present.      Mental Status: She is alert and oriented to person, place, and time. Mental status is at baseline.   Psychiatric:         Mood and Affect: Mood normal.         Behavior: Behavior normal.         Thought Content: Thought content normal.       Medications Ordered Prior to Encounter[1]      Assessment:       1. Primary hypertension    2. Class 3 severe obesity with body mass index (BMI) of 45.0 to 49.9 in adult, unspecified obesity type, unspecified whether serious comorbidity present        Plan:       Primary hypertension   - in office blood pressure updated to reflect home control on spironolactone (refilled)    Class 3 severe obesity with body mass index (BMI) of 45.0 to 49.9 in adult, unspecified obesity type, unspecified whether serious comorbidity present   - unfortunately, [facility] policy is seemingly unwilling to participate in any way regarding pharmacological weight loss.    Other orders  -     spironolactone (ALDACTONE) 50 MG tablet; Take 1 tablet (50 mg total) by mouth once daily.  Dispense: 90 tablet; Refill: 3  -     losartan (COZAAR) 25 MG tablet; Take 1  tablet (25 mg total) by mouth once daily.  Dispense: 90 tablet; Refill: 3                  [1]   Current Outpatient Medications on File Prior to Visit   Medication Sig Dispense Refill    ALPRAZolam (XANAX) 1 MG tablet Take 1 tablet (1 mg total) by mouth 2 (two) times daily. 30 tablet 0    clotrimazole-betamethasone 1-0.05% (LOTRISONE) cream Apply topically 2 (two) times daily. 15 g 1    hydrocortisone-pramoxine (ANALPRAM-HC) 2.5-1 % Crea Place rectally 3 (three) times daily. 30 g 0    LINZESS 72 mcg Cap capsule Take 1 capsule (72 mcg total) by mouth before breakfast. 90 capsule 1    valACYclovir (VALTREX) 1000 MG tablet Take 1 tablet by mouth twice daily for 10 days at earliest sign of symptoms 40 tablet 1    [DISCONTINUED] losartan (COZAAR) 25 MG tablet Take 1 tablet (25 mg total) by mouth once daily. 90 tablet 2    [DISCONTINUED] spironolactone (ALDACTONE) 50 MG tablet Take 1 tablet (50 mg total) by mouth once daily. 90 tablet 3    doxycycline (MONODOX) 100 MG capsule Take 1 capsule (100 mg total) by mouth every 12 (twelve) hours. For 2 weeks then once a day until gone 50 capsule 0     No current facility-administered medications on file prior to visit.

## 2025-04-01 ENCOUNTER — OFFICE VISIT (OUTPATIENT)
Dept: INTERNAL MEDICINE | Facility: CLINIC | Age: 45
End: 2025-04-01
Payer: COMMERCIAL

## 2025-04-01 VITALS
SYSTOLIC BLOOD PRESSURE: 131 MMHG | WEIGHT: 277.31 LBS | DIASTOLIC BLOOD PRESSURE: 81 MMHG | TEMPERATURE: 98 F | BODY MASS INDEX: 47.34 KG/M2 | RESPIRATION RATE: 18 BRPM | HEIGHT: 64 IN | HEART RATE: 67 BPM

## 2025-04-01 DIAGNOSIS — E66.813 CLASS 3 SEVERE OBESITY WITH BODY MASS INDEX (BMI) OF 45.0 TO 49.9 IN ADULT, UNSPECIFIED OBESITY TYPE, UNSPECIFIED WHETHER SERIOUS COMORBIDITY PRESENT: ICD-10-CM

## 2025-04-01 DIAGNOSIS — I10 PRIMARY HYPERTENSION: Primary | ICD-10-CM

## 2025-04-01 DIAGNOSIS — E66.01 CLASS 3 SEVERE OBESITY WITH BODY MASS INDEX (BMI) OF 45.0 TO 49.9 IN ADULT, UNSPECIFIED OBESITY TYPE, UNSPECIFIED WHETHER SERIOUS COMORBIDITY PRESENT: ICD-10-CM

## 2025-04-01 PROCEDURE — 3079F DIAST BP 80-89 MM HG: CPT | Mod: CPTII,S$GLB,, | Performed by: STUDENT IN AN ORGANIZED HEALTH CARE EDUCATION/TRAINING PROGRAM

## 2025-04-01 PROCEDURE — 99999 PR PBB SHADOW E&M-EST. PATIENT-LVL IV: CPT | Mod: PBBFAC,,, | Performed by: STUDENT IN AN ORGANIZED HEALTH CARE EDUCATION/TRAINING PROGRAM

## 2025-04-01 PROCEDURE — G2211 COMPLEX E/M VISIT ADD ON: HCPCS | Mod: GZ,S$GLB,, | Performed by: STUDENT IN AN ORGANIZED HEALTH CARE EDUCATION/TRAINING PROGRAM

## 2025-04-01 PROCEDURE — 3075F SYST BP GE 130 - 139MM HG: CPT | Mod: CPTII,S$GLB,, | Performed by: STUDENT IN AN ORGANIZED HEALTH CARE EDUCATION/TRAINING PROGRAM

## 2025-04-01 PROCEDURE — 99214 OFFICE O/P EST MOD 30 MIN: CPT | Mod: S$GLB,,, | Performed by: STUDENT IN AN ORGANIZED HEALTH CARE EDUCATION/TRAINING PROGRAM

## 2025-04-01 PROCEDURE — 1159F MED LIST DOCD IN RCRD: CPT | Mod: CPTII,S$GLB,, | Performed by: STUDENT IN AN ORGANIZED HEALTH CARE EDUCATION/TRAINING PROGRAM

## 2025-04-01 PROCEDURE — 4010F ACE/ARB THERAPY RXD/TAKEN: CPT | Mod: CPTII,S$GLB,, | Performed by: STUDENT IN AN ORGANIZED HEALTH CARE EDUCATION/TRAINING PROGRAM

## 2025-04-01 PROCEDURE — 3008F BODY MASS INDEX DOCD: CPT | Mod: CPTII,S$GLB,, | Performed by: STUDENT IN AN ORGANIZED HEALTH CARE EDUCATION/TRAINING PROGRAM

## 2025-04-01 RX ORDER — SPIRONOLACTONE 50 MG/1
50 TABLET, FILM COATED ORAL DAILY
Qty: 90 TABLET | Refills: 3 | Status: SHIPPED | OUTPATIENT
Start: 2025-04-01 | End: 2026-04-01

## 2025-04-01 RX ORDER — LOSARTAN POTASSIUM 25 MG/1
25 TABLET ORAL DAILY
Qty: 90 TABLET | Refills: 3 | Status: SHIPPED | OUTPATIENT
Start: 2025-04-01

## 2025-04-06 DIAGNOSIS — F41.9 ANXIETY DISORDER, UNSPECIFIED TYPE: ICD-10-CM

## 2025-04-08 RX ORDER — ALPRAZOLAM 1 MG/1
1 TABLET ORAL 2 TIMES DAILY
Qty: 30 TABLET | Refills: 0 | Status: SHIPPED | OUTPATIENT
Start: 2025-04-08

## 2025-05-08 ENCOUNTER — PATIENT MESSAGE (OUTPATIENT)
Dept: OBSTETRICS AND GYNECOLOGY | Facility: CLINIC | Age: 45
End: 2025-05-08
Payer: COMMERCIAL

## 2025-05-08 DIAGNOSIS — N89.8 VAGINAL IRRITATION: Primary | ICD-10-CM

## 2025-05-10 RX ORDER — FLUCONAZOLE 150 MG/1
TABLET ORAL
Qty: 3 TABLET | Refills: 0 | Status: SHIPPED | OUTPATIENT
Start: 2025-05-10

## 2025-05-13 ENCOUNTER — PATIENT MESSAGE (OUTPATIENT)
Dept: OBSTETRICS AND GYNECOLOGY | Facility: CLINIC | Age: 45
End: 2025-05-13
Payer: COMMERCIAL

## 2025-05-14 ENCOUNTER — HOSPITAL ENCOUNTER (OUTPATIENT)
Dept: RADIOLOGY | Facility: HOSPITAL | Age: 45
Discharge: HOME | End: 2025-05-14
Attending: OBSTETRICS & GYNECOLOGY
Payer: COMMERCIAL

## 2025-05-14 ENCOUNTER — RESULTS FOLLOW-UP (OUTPATIENT)
Dept: OBSTETRICS AND GYNECOLOGY | Facility: CLINIC | Age: 45
End: 2025-05-14

## 2025-05-14 DIAGNOSIS — Z98.890 HISTORY OF LUMPECTOMY OF RIGHT BREAST: ICD-10-CM

## 2025-05-14 DIAGNOSIS — Z12.31 BREAST CANCER SCREENING BY MAMMOGRAM: ICD-10-CM

## 2025-05-14 DIAGNOSIS — Z80.3 FAMILY HISTORY OF BREAST CANCER IN MOTHER: ICD-10-CM

## 2025-05-14 PROCEDURE — 77063 BREAST TOMOSYNTHESIS BI: CPT | Mod: TC

## 2025-05-14 PROCEDURE — 77063 BREAST TOMOSYNTHESIS BI: CPT | Mod: 26,,, | Performed by: RADIOLOGY

## 2025-05-14 PROCEDURE — 77067 SCR MAMMO BI INCL CAD: CPT | Mod: 26,,, | Performed by: RADIOLOGY

## 2025-06-13 ENCOUNTER — TELEPHONE (OUTPATIENT)
Dept: PHARMACY | Facility: CLINIC | Age: 45
End: 2025-06-13
Payer: COMMERCIAL

## 2025-06-13 NOTE — TELEPHONE ENCOUNTER
[facility]/Population Health Chart Review & Patient Outreach Details For Medication Adherence Project    Reason for Outreach Encounter: 3rd Party payor non-compliance report ([employer], [employer], C, etc)  2.  Patient Outreach Method: Reviewed patient chart   3.   Medication in question:    Hypertension Medications              losartan (COZAAR) 25 MG tablet Take 1 tablet (25 mg total) by mouth once daily.    spironolactone (ALDACTONE) 50 MG tablet Take 1 tablet (50 mg total) by mouth once daily.                 losartan  last filled  5/23/25 for 90 day supply      4.  Reviewed and or Updates Made To: Patient Chart  5. Outreach Outcomes and/or actions taken: Patient filled medication and is on track to be adherent  Additional Notes:

## 2025-06-24 NOTE — ANESTHESIA PREPROCEDURE EVALUATION
03/23/2017  Rubina Gonzalez is a 36 y.o., female.    [facility] Anesthesia Evaluation    I have reviewed the Patient Summary Reports.    I have reviewed the Nursing Notes.   I have reviewed the Medications.     Review of Systems  Anesthesia Hx:  No problems with previous Anesthesia  Denies Family Hx of Anesthesia complications.   Denies Personal Hx of Anesthesia complications.   Social:  Non-Smoker    Hematology/Oncology:  Hematology Normal   Oncology Normal     Cardiovascular:   Lost weight and off BP meds   Pulmonary:  Pulmonary Normal    Renal/:  Renal/ Normal     Hepatic/GI:  Hepatic/GI Normal    Musculoskeletal:  Musculoskeletal Normal    Neurological:  Neurology Normal    Endocrine:  Endocrine Normal        Physical Exam  General:  Well nourished    Airway/Jaw/Neck:  Airway Findings: Tongue: Normal General Airway Assessment: Adult  Mallampati: I  TM Distance: Normal, at least 6 cm         Dental:  Dental Findings: In tact             Anesthesia Plan  Type of Anesthesia, risks & benefits discussed:  Anesthesia Type:  general  Patient's Preference:   Intra-op Monitoring Plan: standard ASA monitors  Intra-op Monitoring Plan Comments:   Post Op Pain Control Plan:   Post Op Pain Control Plan Comments:   Induction:   IV  Beta Blocker:         Informed Consent: Patient understands risks and agrees with Anesthesia plan.  Questions answered. Anesthesia consent signed with patient.  ASA Score: 2     Day of Surgery Review of History & Physical:    H&P update referred to the surgeon.         Ready For Surgery From Anesthesia Perspective.        Patient called in to schedule Transitional Care Management warm transfer to [facility] at [facility] to assist patient

## 2025-07-02 NOTE — PROGRESS NOTES
Subjective:    The following note was written in combination with deep-scribe software and dictation (to which understanding and consent was verbally expressed); please excuse any transcription and/or dictation errors.      Chief Complaint: Sleep Apnea, Obesity (Weight loss options/), Leg Swelling (ankles), and Allergies    HPI   Ms.Kristine Gonzalez is a 45-year-old woman with pertinent medical history including anxiety/depression (previously prescribed Lexapro; Xanax 1 b.i.d. p.r.n.) & hypertension (spironolactone 25 q.a.m. & losartan 25 with dinner) presenting annual physical in addition to evaluation/treatment of numerous issues as below; as these are different appointment types, legality dictates they be billed/coded accordingly:    Concern for sleep apnea:   -personally concern given snoring (without witnessed apnea)  -denies daytime fatigue    BMI: 48.1:   -has been referred for bariatric services in the past (institutional insurance does not cover GLP 1 injectable medication, but she is certain they will cover oral medications)    KISHORE:   -formally evaluated for via baseline echo 11/24  -describing lower extremity edema which occurs indolently throughout the day (worsening late afternoon) resolves overnight  -symptoms are symmetric/bilateral    Allergies:  - reports longstanding sinus allergies for which she would be amenable to desensitization therapy if deemed a candidate      Family, social, surgical Hx reviewed     Health Maintenance:  Due for     Past Medical History:   Diagnosis Date    Abnormal Pap smear of cervix 11/12/2015    ASCUS / Hpv Neg    COVID-19 virus infection 07/2020    Depression     Family history of breast cancer in mother     dx. age 48    Galactorrhea     History of kidney stones 03/2023    Hypertension     on Lisinopril    Insomnia     Irregular menstrual bleeding     Migraine     topomax    Panic attacks      Past Surgical History:   Procedure Laterality Date    BREAST BIOPSY Right  "03/24/2017    Core Bx  (Path:  Fibrocystic Changes)    BREAST LUMPECTOMY W/ NEEDLE LOCALIZATION Right 11/2019    per  (Path Diag: "simple benign cyst" - f/u every 6 mos.    CHOLECYSTECTOMY  2006    CYSTOURETEROSCOPY,WITH HOLMIUM LASER LITHOTRIPSY OF URETERAL CALCULUS Left 8/30/2023    Procedure: CYSTOURETEROSCOPY,WITH HOLMIUM LASER LITHOTRIPSY OF URETERAL CALCULUS;  Surgeon: Luis Hussein MD;  Location: [facility] OR;  Service: Urology;  Laterality: Left;  1.5 hrs    ENDOMETRIAL ABLATION  03/28/2017    Dr Adrian Bone    URETERAL STENT PLACEMENT Left 8/30/2023    Procedure: INSERTION, STENT, URETER;  Surgeon: Luis Hussein MD;  Location: [facility] OR;  Service: Urology;  Laterality: Left;     Family History   Problem Relation Name Age of Onset    Colon cancer Paternal Grandmother      Cancer Paternal Grandmother      Breast cancer Paternal Grandmother      Colon cancer Maternal Grandfather      Cancer Maternal Grandfather      Hypertension Father      Breast cancer Mother  57    Hypertension Mother      Cancer Mother      No Known Problems Brother      No Known Problems Brother      Depression Daughter      Depression Daughter      No Known Problems Daughter      No Known Problems Daughter      Breast cancer Paternal Aunt      Cancer Paternal Aunt      Ovarian cancer Neg Hx       Social History[1]  Review of patient's allergies indicates:   Allergen Reactions    Ciprofloxacin Hives     Other reaction(s): constipation & face swelling  Other reaction(s): constipation & face swelling    Cephalexin Rash     Erin Gonzalez had no medications administered during this visit.   Review of Systems   Constitutional:  Negative for appetite change, chills and fever.   HENT: Negative.     Respiratory:  Negative for cough, chest tightness and shortness of breath.    Cardiovascular:  Negative for chest pain, palpitations and leg swelling.   Gastrointestinal:  Negative for abdominal distention, abdominal pain, blood in " stool, constipation, diarrhea, nausea and vomiting.   Endocrine: Negative.    Genitourinary:  Negative for difficulty urinating, dysuria, frequency and hematuria.   Musculoskeletal: Negative.    Integumentary:  Negative.   Neurological: Negative.    Psychiatric/Behavioral: Negative.           Objective:      Vitals:    07/03/25 1101   BP: (!) 120/98   Pulse: 85   Resp: 18   Temp: 97.2 °F (36.2 °C)      Physical Exam  Vitals reviewed.   Constitutional:       General: She is not in acute distress.     Appearance: Normal appearance.   HENT:      Head: Normocephalic and atraumatic.      Comments: Facial features are symmetric      Nose: Nose normal. No congestion or rhinorrhea.      Mouth/Throat:      Mouth: Mucous membranes are moist.      Pharynx: Oropharynx is clear. No oropharyngeal exudate or posterior oropharyngeal erythema.   Eyes:      General: No scleral icterus.     Extraocular Movements: Extraocular movements intact.      Conjunctiva/sclera: Conjunctivae normal.   Cardiovascular:      Rate and Rhythm: Normal rate and regular rhythm.      Pulses: Normal pulses.      Heart sounds: Normal heart sounds.   Pulmonary:      Effort: Pulmonary effort is normal. No respiratory distress.      Breath sounds: Normal breath sounds.   Musculoskeletal:         General: No deformity or signs of injury. Normal range of motion.      Cervical back: Normal range of motion.   Skin:     General: Skin is warm and dry.      Findings: No rash.   Neurological:      General: No focal deficit present.      Mental Status: She is alert and oriented to person, place, and time. Mental status is at baseline.   Psychiatric:         Mood and Affect: Mood normal.         Behavior: Behavior normal.         Thought Content: Thought content normal.       Medications Ordered Prior to Encounter[2]      Assessment:       1. Snoring    2. BMI 45.0-49.9, adult    3. Lower extremity edema    4. Allergy, initial encounter    5. Physical exam, annual         Plan:       Snoring  -     Ambulatory referral/consult to Sleep Disorders; Future; Expected date: 07/10/2025   - will facilitate sleep medicine establishment and follow along; recommendations and interventions appreciated     BMI 45.0-49.9, adult  -     Ambulatory referral/consult to Bariatric/Obesity Medicine; Future; Expected date: 07/10/2025   - extensive conversation had regarding lack of formal certification to treat obesity and referral to bariatric medicine regenerated.  Appointment reportedly deferred by bariatric in the past due to lack of presumed in GLP 1 injectable coverage; she is seeking out oral medications only.    Lower extremity edema  -     US Lower Extremity Veins Bilateral; Future; Expected date: 07/03/2025   - reassurance provided that prior echocardiogram showed no signs of heart failure   - will gathered ultrasound to confirm suspicion for chronic venous insufficiency    Allergy, initial encounter  -     Ambulatory referral/consult to Allergy; Future; Expected date: 07/10/2025   - per patient preference will facilitate allergy establishment for formal testing and desensitization therapy if deemed a candidate; recommendations and interventions appreciated     Physical exam, annual  -     CBC Auto Differential; Future; Expected date: 07/03/2025  -     Comprehensive Metabolic Panel; Future; Expected date: 07/03/2025  -     Hemoglobin A1C; Future; Expected date: 07/03/2025  -     Lipid Panel; Future; Expected date: 07/03/2025  -     Vitamin D; Future; Expected date: 07/03/2025  -     TSH; Future; Expected date: 07/03/2025   - annual screening labs ordered    Return to clinic in one year for annual exam or sooner if dictated by labs or illness.                   [1]   Social History  Socioeconomic History    Marital status:    Tobacco Use    Smoking status: Never    Smokeless tobacco: Never   Substance and Sexual Activity    Alcohol use: Yes     Comment: Social    Drug use: No    Sexual  activity: Yes     Partners: Male     Birth control/protection: Partner-Vasectomy     Comment: :  Ha   Social History Narrative    Four girls, one accepted to [employer]     Social Drivers of Health     Financial Resource Strain: Low Risk  (6/11/2024)    Overall Financial Resource Strain (CARDIA)     Difficulty of Paying Living Expenses: Not hard at all   Food Insecurity: No Food Insecurity (6/11/2024)    Hunger Vital Sign     Worried About Running Out of Food in the Last Year: Never true     Ran Out of Food in the Last Year: Never true   Transportation Needs: No Transportation Needs (9/12/2023)    PRAPARE - Transportation     Lack of Transportation (Medical): No     Lack of Transportation (Non-Medical): No   Physical Activity: Inactive (6/11/2024)    Exercise Vital Sign     Days of Exercise per Week: 0 days     Minutes of Exercise per Session: 0 min   Stress: Stress Concern Present (6/11/2024)    [employer] - Occupational Stress Questionnaire     Feeling of Stress : To some extent   Housing Stability: High Risk (9/12/2023)    Housing Stability Vital Sign     Unable to Pay for Housing in the Last Year: Yes     Number of Places Lived in the Last Year: 1     Unstable Housing in the Last Year: No   [2]   Current Outpatient Medications on File Prior to Visit   Medication Sig Dispense Refill    ALPRAZolam (XANAX) 1 MG tablet Take 1 tablet (1 mg total) by mouth 2 (two) times daily. (Patient taking differently: Take 1 mg by mouth 2 (two) times daily.) 30 tablet 0    clotrimazole-betamethasone 1-0.05% (LOTRISONE) cream Apply topically 2 (two) times daily. (Patient taking differently: Apply topically 2 (two) times daily as needed.) 15 g 1    hydrocortisone-pramoxine (ANALPRAM-HC) 2.5-1 % Crea Place rectally 3 (three) times daily. 30 g 0    LINZESS 72 mcg Cap capsule Take 1 capsule (72 mcg total) by mouth before breakfast. 90 capsule 1    losartan (COZAAR) 25 MG tablet Take 1 tablet (25  mg total) by mouth once daily. 90 tablet 3    spironolactone (ALDACTONE) 50 MG tablet Take 1 tablet (50 mg total) by mouth once daily. 90 tablet 3    valACYclovir (VALTREX) 1000 MG tablet Take 1 tablet by mouth twice daily for 10 days at earliest sign of symptoms 40 tablet 1    doxycycline (MONODOX) 100 MG capsule Take 1 capsule (100 mg total) by mouth every 12 (twelve) hours. For 2 weeks then once a day until gone 50 capsule 0    fluconazole (DIFLUCAN) 150 MG Tab Take one tablet by mouth every other day for 3 doses. (Patient not taking: Reported on 7/3/2025) 3 tablet 0     No current facility-administered medications on file prior to visit.

## 2025-07-03 ENCOUNTER — OFFICE VISIT (OUTPATIENT)
Dept: INTERNAL MEDICINE | Facility: CLINIC | Age: 45
End: 2025-07-03
Payer: COMMERCIAL

## 2025-07-03 VITALS
RESPIRATION RATE: 18 BRPM | HEART RATE: 85 BPM | SYSTOLIC BLOOD PRESSURE: 120 MMHG | HEIGHT: 64 IN | TEMPERATURE: 97 F | OXYGEN SATURATION: 98 % | WEIGHT: 280.44 LBS | BODY MASS INDEX: 47.88 KG/M2 | DIASTOLIC BLOOD PRESSURE: 98 MMHG

## 2025-07-03 DIAGNOSIS — Z00.00 PHYSICAL EXAM, ANNUAL: ICD-10-CM

## 2025-07-03 DIAGNOSIS — T78.40XA ALLERGY, INITIAL ENCOUNTER: ICD-10-CM

## 2025-07-03 DIAGNOSIS — R06.83 SNORING: Primary | ICD-10-CM

## 2025-07-03 DIAGNOSIS — R60.0 LOWER EXTREMITY EDEMA: ICD-10-CM

## 2025-07-03 PROCEDURE — 99999 PR PBB SHADOW E&M-EST. PATIENT-LVL V: CPT | Mod: PBBFAC,,, | Performed by: STUDENT IN AN ORGANIZED HEALTH CARE EDUCATION/TRAINING PROGRAM

## 2025-07-07 ENCOUNTER — RESULTS FOLLOW-UP (OUTPATIENT)
Dept: INTERNAL MEDICINE | Facility: CLINIC | Age: 45
End: 2025-07-07

## 2025-07-07 ENCOUNTER — LAB VISIT (OUTPATIENT)
Dept: LAB | Facility: HOSPITAL | Age: 45
End: 2025-07-07
Attending: STUDENT IN AN ORGANIZED HEALTH CARE EDUCATION/TRAINING PROGRAM
Payer: COMMERCIAL

## 2025-07-07 DIAGNOSIS — Z00.00 PHYSICAL EXAM, ANNUAL: ICD-10-CM

## 2025-07-07 LAB
25(OH)D3+25(OH)D2 SERPL-MCNC: 34 NG/ML (ref 30–96)
ABSOLUTE EOSINOPHIL (OHS): 0.38 K/UL
ABSOLUTE MONOCYTE (OHS): 0.68 K/UL (ref 0.3–1)
ABSOLUTE NEUTROPHIL COUNT (OHS): 8.96 K/UL (ref 1.8–7.7)
ALBUMIN SERPL BCP-MCNC: 4.1 G/DL (ref 3.5–5.2)
ALP SERPL-CCNC: 97 UNIT/L (ref 40–150)
ALT SERPL W/O P-5'-P-CCNC: 26 UNIT/L (ref 10–44)
ANION GAP (OHS): 11 MMOL/L (ref 8–16)
AST SERPL-CCNC: 21 UNIT/L (ref 11–45)
BASOPHILS # BLD AUTO: 0.07 K/UL
BASOPHILS NFR BLD AUTO: 0.6 %
BILIRUB SERPL-MCNC: 0.5 MG/DL (ref 0.1–1)
BUN SERPL-MCNC: 12 MG/DL (ref 6–20)
CALCIUM SERPL-MCNC: 9.3 MG/DL (ref 8.7–10.5)
CHLORIDE SERPL-SCNC: 106 MMOL/L (ref 95–110)
CHOLEST SERPL-MCNC: 217 MG/DL (ref 120–199)
CHOLEST/HDLC SERPL: 4.9 {RATIO} (ref 2–5)
CO2 SERPL-SCNC: 21 MMOL/L (ref 23–29)
CREAT SERPL-MCNC: 0.8 MG/DL (ref 0.5–1.4)
EAG (OHS): 114 MG/DL (ref 68–131)
ERYTHROCYTE [DISTWIDTH] IN BLOOD BY AUTOMATED COUNT: 13.3 % (ref 11.5–14.5)
GFR SERPLBLD CREATININE-BSD FMLA CKD-EPI: >60 ML/MIN/1.73/M2
GLUCOSE SERPL-MCNC: 103 MG/DL (ref 70–110)
HBA1C MFR BLD: 5.6 % (ref 4–5.6)
HCT VFR BLD AUTO: 42.3 % (ref 37–48.5)
HDLC SERPL-MCNC: 44 MG/DL (ref 40–75)
HDLC SERPL: 20.3 % (ref 20–50)
HGB BLD-MCNC: 13.7 GM/DL (ref 12–16)
IMM GRANULOCYTES # BLD AUTO: 0.04 K/UL (ref 0–0.04)
IMM GRANULOCYTES NFR BLD AUTO: 0.3 % (ref 0–0.5)
LDLC SERPL CALC-MCNC: 139.4 MG/DL (ref 63–159)
LYMPHOCYTES # BLD AUTO: 1.5 K/UL (ref 1–4.8)
MCH RBC QN AUTO: 29.2 PG (ref 27–31)
MCHC RBC AUTO-ENTMCNC: 32.4 G/DL (ref 32–36)
MCV RBC AUTO: 90 FL (ref 82–98)
NONHDLC SERPL-MCNC: 173 MG/DL
NUCLEATED RBC (/100WBC) (OHS): 0 /100 WBC
PLATELET # BLD AUTO: 323 K/UL (ref 150–450)
PMV BLD AUTO: 11.1 FL (ref 9.2–12.9)
POTASSIUM SERPL-SCNC: 4.6 MMOL/L (ref 3.5–5.1)
PROT SERPL-MCNC: 7.7 GM/DL (ref 6–8.4)
RBC # BLD AUTO: 4.69 M/UL (ref 4–5.4)
RELATIVE EOSINOPHIL (OHS): 3.3 %
RELATIVE LYMPHOCYTE (OHS): 12.9 % (ref 18–48)
RELATIVE MONOCYTE (OHS): 5.8 % (ref 4–15)
RELATIVE NEUTROPHIL (OHS): 77.1 % (ref 38–73)
SODIUM SERPL-SCNC: 138 MMOL/L (ref 136–145)
TRIGL SERPL-MCNC: 168 MG/DL (ref 30–150)
TSH SERPL-ACNC: 2 UIU/ML (ref 0.4–4)
WBC # BLD AUTO: 11.63 K/UL (ref 3.9–12.7)

## 2025-07-07 PROCEDURE — 80053 COMPREHEN METABOLIC PANEL: CPT

## 2025-07-07 PROCEDURE — 85025 COMPLETE CBC W/AUTO DIFF WBC: CPT

## 2025-07-07 PROCEDURE — 80061 LIPID PANEL: CPT

## 2025-07-07 PROCEDURE — 36415 COLL VENOUS BLD VENIPUNCTURE: CPT | Mod: PO

## 2025-07-07 PROCEDURE — 82306 VITAMIN D 25 HYDROXY: CPT

## 2025-07-07 PROCEDURE — 83036 HEMOGLOBIN GLYCOSYLATED A1C: CPT

## 2025-07-07 PROCEDURE — 84443 ASSAY THYROID STIM HORMONE: CPT

## 2025-07-08 DIAGNOSIS — K59.00 CONSTIPATION, UNSPECIFIED CONSTIPATION TYPE: ICD-10-CM

## 2025-07-08 RX ORDER — LINACLOTIDE 72 UG/1
72 CAPSULE, GELATIN COATED ORAL
Qty: 90 CAPSULE | Refills: 1 | Status: SHIPPED | OUTPATIENT
Start: 2025-07-08

## 2025-07-08 NOTE — TELEPHONE ENCOUNTER
Refill Routing Note   Medication(s) are not appropriate for processing by [facility] for the following reason(s):        Outside of protocol    [facility] action(s):  Route               Appointments  past 12m or future 3m with PCP    Date Provider   Last Visit   2/20/2025 Nguyễn Barbosa MD   Next Visit   9/25/2025 Nguyễn Barbosa MD   ED visits in past 90 days: 0        Note composed:7:52 AM 07/08/2025

## 2025-07-10 ENCOUNTER — LAB VISIT (OUTPATIENT)
Dept: LAB | Facility: HOSPITAL | Age: 45
End: 2025-07-10
Payer: COMMERCIAL

## 2025-07-10 ENCOUNTER — OFFICE VISIT (OUTPATIENT)
Dept: ALLERGY | Facility: CLINIC | Age: 45
End: 2025-07-10
Payer: COMMERCIAL

## 2025-07-10 ENCOUNTER — OFFICE VISIT (OUTPATIENT)
Dept: SLEEP MEDICINE | Facility: CLINIC | Age: 45
End: 2025-07-10
Payer: COMMERCIAL

## 2025-07-10 VITALS — BODY MASS INDEX: 47.68 KG/M2 | HEIGHT: 64 IN | WEIGHT: 279.31 LBS

## 2025-07-10 DIAGNOSIS — H10.423 SIMPLE CHRONIC CONJUNCTIVITIS OF BOTH EYES: ICD-10-CM

## 2025-07-10 DIAGNOSIS — R06.83 SNORING: ICD-10-CM

## 2025-07-10 DIAGNOSIS — T78.40XA ALLERGY, INITIAL ENCOUNTER: ICD-10-CM

## 2025-07-10 DIAGNOSIS — J31.0 CHRONIC RHINITIS: ICD-10-CM

## 2025-07-10 DIAGNOSIS — J31.0 CHRONIC RHINITIS: Primary | ICD-10-CM

## 2025-07-10 PROCEDURE — 86003 ALLG SPEC IGE CRUDE XTRC EA: CPT | Mod: 59

## 2025-07-10 PROCEDURE — 86003 ALLG SPEC IGE CRUDE XTRC EA: CPT

## 2025-07-10 PROCEDURE — 3044F HG A1C LEVEL LT 7.0%: CPT | Mod: CPTII,S$GLB,, | Performed by: ALLERGY & IMMUNOLOGY

## 2025-07-10 PROCEDURE — 99204 OFFICE O/P NEW MOD 45 MIN: CPT | Mod: S$GLB,,, | Performed by: ALLERGY & IMMUNOLOGY

## 2025-07-10 PROCEDURE — 1160F RVW MEDS BY RX/DR IN RCRD: CPT | Mod: CPTII,S$GLB,, | Performed by: ALLERGY & IMMUNOLOGY

## 2025-07-10 PROCEDURE — 4010F ACE/ARB THERAPY RXD/TAKEN: CPT | Mod: CPTII,S$GLB,, | Performed by: ALLERGY & IMMUNOLOGY

## 2025-07-10 PROCEDURE — 99999 PR PBB SHADOW E&M-EST. PATIENT-LVL III: CPT | Mod: PBBFAC,,, | Performed by: ALLERGY & IMMUNOLOGY

## 2025-07-10 PROCEDURE — 3008F BODY MASS INDEX DOCD: CPT | Mod: CPTII,S$GLB,, | Performed by: ALLERGY & IMMUNOLOGY

## 2025-07-10 PROCEDURE — 99499 UNLISTED E&M SERVICE: CPT | Mod: S$GLB,,, | Performed by: NURSE PRACTITIONER

## 2025-07-10 PROCEDURE — 1159F MED LIST DOCD IN RCRD: CPT | Mod: CPTII,S$GLB,, | Performed by: ALLERGY & IMMUNOLOGY

## 2025-07-10 PROCEDURE — 36415 COLL VENOUS BLD VENIPUNCTURE: CPT | Mod: PO

## 2025-07-10 RX ORDER — AZELASTINE 1 MG/ML
2 SPRAY, METERED NASAL 2 TIMES DAILY PRN
Qty: 30 ML | Refills: 12 | Status: SHIPPED | OUTPATIENT
Start: 2025-07-10

## 2025-07-10 RX ORDER — AZELASTINE HYDROCHLORIDE 0.5 MG/ML
1 SOLUTION/ DROPS OPHTHALMIC 2 TIMES DAILY PRN
Qty: 6 ML | Refills: 12 | Status: SHIPPED | OUTPATIENT
Start: 2025-07-10 | End: 2026-07-10

## 2025-07-10 NOTE — PROGRESS NOTES
Subjective:       Patient ID: Rubina Gonzalez is a 45 y.o. female.    Chief Complaint:  Allergies      44 yo woman presents for consult from Dr Eliel Lopez for possible allergies. She states she has watery itchy burning eyes, sneeze, runny nose, PND causing irritated throat and congestion. She has snoring. No chest symptoms. Also gets itchy skin. Has all year, no season worse. Worse in AM and in later at night. No difference inside or out. No triggers. Takes H 1 blocker daily, changes between Claritin, zyrtec and allegra. Has been on Allegra most recently. In past tried Flonase and helped but off for awhile. Never used Singulair or azelastine. No H/O asthma or eczema. No known food, insect or latex allergy. No ENT surgery. Has HTN. No other medical issues.         Environmental History: see history section for home environment  Review of Systems   HENT:  Positive for congestion, postnasal drip, rhinorrhea, sinus pressure, sneezing and sore throat. Negative for ear pain, facial swelling and nosebleeds.    Eyes:  Positive for discharge and itching. Negative for redness.   Respiratory:  Negative for cough, chest tightness, shortness of breath and wheezing.    Skin:  Negative for color change and rash.        Objective:      Physical Exam  Vitals and nursing note reviewed.   Constitutional:       General: She is not in acute distress.     Appearance: Normal appearance. She is not ill-appearing.   HENT:      Nose: No rhinorrhea.   Eyes:      General:         Right eye: No discharge.         Left eye: No discharge.      Conjunctiva/sclera: Conjunctivae normal.   Pulmonary:      Effort: Pulmonary effort is normal. No respiratory distress.   Abdominal:      General: There is no distension.   Skin:     General: Skin is warm and dry.      Findings: No erythema or rash.   Neurological:      Mental Status: She is alert and oriented to person, place, and time.   Psychiatric:         Mood and Affect: Mood normal.          Behavior: Behavior normal.         Laboratory:   none performed   Assessment:       1. Chronic rhinitis    2. Allergy, initial encounter    3. Simple chronic conjunctivitis of both eyes         Plan:       Rhinitis and conjunctivitis- advised can be allergic vs chronic non allergic, juarez end immunocaps to assess  Continue oral H 1 blocker daily  Add azelastine nasal 2 SEN BID as needed and azelastine eye drop 1 drop each eye BID as needed  Phone review  Dr Lopez notified of completed consult via Epic    I spent a total of 45 minutes on the day of the visit.  This includes face to face time and non-face to face time preparing to see the patient (eg, review of tests), obtaining and/or reviewing separately obtained history, documenting clinical information in the electronic or other health record, independently interpreting results and communicating results to the patient/family/caregiver, or care coordinator.

## 2025-07-14 LAB
W WHITE PINE, CLASS: NORMAL
W WHITE PINE, IGE: <0.1 KU/L
W WILLOW, CLASS: NORMAL
W WILLOW, IGE: <0.1 KU/L

## 2025-07-15 LAB
W ALTERNARIA ALTERNATA, CLASS: NORMAL
W ALTERNARIA ALTERNATA, IGE: <0.1 KU/L
W ASPERGILLUS FUMIGATUS, CLASS: NORMAL
W ASPERGILLUS FUMIGATUS, IGE: <0.1 KU/L
W BAHIA GRASS, CLASS: ABNORMAL
W BAHIA GRASS, IGE: 0.11 KU/L
W BALD CYPRESS, CLASS: NORMAL
W BALD CYPRESS, IGE: <0.1 KU/L
W BERMUDA GRASS, CLASS: NORMAL
W BERMUDA GRASS, IGE: <0.1 KU/L
W CAT DANDER, CLASS: NORMAL
W CAT DANDER, IGE: 0.1 KU/L
W CHAETOMIUM GLOBOSUM, CLASS: NORMAL
W CHAETOMIUM GLOBOSUM, IGE: <0.1 KU/L
W CLADOSPORIUM HERBARUM, CLASS: NORMAL
W CLADOSPORIUM HERBARUM, IGE: <0.1 KU/L
W COCKROACH, GERMAN, CLASS: NORMAL
W COCKROACH, GERMAN, IGE: <0.1 KU/L
W COMMON RAGWEED (SHORT), CLASS: NORMAL
W COMMON RAGWEED (SHORT), IGE: <0.1 KU/L
W COMMON SILVER BIRCH, CLASS: NORMAL
W COMMON SILVER BIRCH, IGE: <0.1 KU/L
W COTTONWOOD, CLASS: NORMAL
W COTTONWOOD, IGE: <0.1 KU/L
W CURVULARIA LUNATA, CLASS: NORMAL
W CURVULARIA LUNATA, IGE: <0.1 KU/L
W DERMATOPHAGOIDES FARINAE CLASS: NORMAL
W DERMATOPHAGOIDES FARINAE, IGE: <0.1 KU/L
W DERMATOPHAGOIDES PTERONYSSINUS CLASS: NORMAL
W DERMATOPHAGOIDES PTERONYSSINUS, IGE: <0.1 KU/L
W DOG DANDER, CLASS: NORMAL
W DOG DANDER, IGE: <0.1 KU/L
W ENGLISH PLANTAIN, RIBWORT, CLASS: NORMAL
W ENGLISH PLANTAIN, RIBWORT, IGE: <0.1 KU/L
W HORSE DANDER , CLASS: NORMAL
W HORSE DANDER , IGE: <0.1 KU/L
W JOHNSON GRASS, CLASS: NORMAL
W JOHNSON GRASS, IGE: <0.1 KU/L
W MAPLE LEAF SYC., LONDON PLANE, CLASS: NORMAL
W MAPLE LEAF SYC., LONDON PLANE, IGE: <0.1 KU/L
W MUGWORT (SAGEBRUSH), CLASS: NORMAL
W MUGWORT (SAGEBRUSH), IGE: <0.1 KU/L
W OAK, CLASS: NORMAL
W OAK, IGE: <0.1 KU/L
W PECAN, HICKORY, CLASS: NORMAL
W PECAN, HICKORY, IGE: <0.1 KU/L
W PENICILLIUM CHRYSOGENUM, CLASS: NORMAL
W PENICILLIUM CHRYSOGENUM, IGE: <0.1 KU/L
W ROUGH MARSHELDER, CLASS: NORMAL
W ROUGH MARSHELDER, IGE: <0.1 KU/L
W SALTWORT, RUSSIAN THISTLE, CLASS: NORMAL
W SALTWORT, RUSSIAN THISTLE, IGE: <0.1 KU/L
W SETOMELANOMMA ROSTRATA, CLASS: NORMAL
W SETOMELANOMMA ROSTRATA, IGE: <0.1 KU/L
W TIMOTHY GRASS, CLASS: ABNORMAL
W TIMOTHY GRASS, IGE: 0.21 KU/L
W WALNUT TREE, CLASS: NORMAL
W WALNUT TREE, IGE: <0.1 KU/L
W WESTERN RAGWEED, CLASS: NORMAL
W WESTERN RAGWEED, IGE: <0.1 KU/L

## 2025-07-17 ENCOUNTER — PATIENT MESSAGE (OUTPATIENT)
Dept: ALLERGY | Facility: CLINIC | Age: 45
End: 2025-07-17
Payer: COMMERCIAL

## 2025-07-24 ENCOUNTER — HOSPITAL ENCOUNTER (OUTPATIENT)
Dept: RADIOLOGY | Facility: HOSPITAL | Age: 45
Discharge: HOME | End: 2025-07-24
Attending: STUDENT IN AN ORGANIZED HEALTH CARE EDUCATION/TRAINING PROGRAM
Payer: COMMERCIAL

## 2025-07-24 DIAGNOSIS — R60.0 LOWER EXTREMITY EDEMA: ICD-10-CM

## 2025-07-24 PROCEDURE — 93970 EXTREMITY STUDY: CPT | Mod: TC

## 2025-07-24 PROCEDURE — 93970 EXTREMITY STUDY: CPT | Mod: 26,,, | Performed by: STUDENT IN AN ORGANIZED HEALTH CARE EDUCATION/TRAINING PROGRAM

## 2025-08-08 ENCOUNTER — PATIENT MESSAGE (OUTPATIENT)
Dept: INTERNAL MEDICINE | Facility: CLINIC | Age: 45
End: 2025-08-08
Payer: COMMERCIAL

## 2025-08-08 ENCOUNTER — PATIENT MESSAGE (OUTPATIENT)
Dept: OBSTETRICS AND GYNECOLOGY | Facility: CLINIC | Age: 45
End: 2025-08-08
Payer: COMMERCIAL

## 2025-08-08 RX ORDER — FLUCONAZOLE 150 MG/1
150 TABLET ORAL DAILY
Qty: 2 TABLET | Refills: 0 | Status: SHIPPED | OUTPATIENT
Start: 2025-08-08 | End: 2025-08-10

## 2025-08-18 ENCOUNTER — OFFICE VISIT (OUTPATIENT)
Dept: SURGERY | Facility: CLINIC | Age: 45
End: 2025-08-18
Payer: COMMERCIAL

## 2025-08-18 VITALS
DIASTOLIC BLOOD PRESSURE: 80 MMHG | HEART RATE: 97 BPM | HEIGHT: 64 IN | SYSTOLIC BLOOD PRESSURE: 155 MMHG | WEIGHT: 279 LBS | BODY MASS INDEX: 47.63 KG/M2

## 2025-08-18 DIAGNOSIS — Z12.39 SCREENING BREAST EXAMINATION: ICD-10-CM

## 2025-08-18 DIAGNOSIS — Z91.89 AT HIGH RISK FOR BREAST CANCER: ICD-10-CM

## 2025-08-18 DIAGNOSIS — F41.9 ANXIETY DUE TO INVASIVE PROCEDURE: ICD-10-CM

## 2025-08-18 DIAGNOSIS — Z80.3 FAMILY HISTORY OF BREAST CANCER IN MOTHER: Primary | ICD-10-CM

## 2025-08-18 DIAGNOSIS — Z12.31 SCREENING MAMMOGRAM, ENCOUNTER FOR: ICD-10-CM

## 2025-08-18 PROCEDURE — 99999 PR PBB SHADOW E&M-EST. PATIENT-LVL III: CPT | Mod: PBBFAC,,, | Performed by: PHYSICIAN ASSISTANT

## 2025-08-18 PROCEDURE — 99212 OFFICE O/P EST SF 10 MIN: CPT | Mod: S$GLB,,, | Performed by: PHYSICIAN ASSISTANT

## 2025-08-18 PROCEDURE — 3008F BODY MASS INDEX DOCD: CPT | Mod: CPTII,S$GLB,, | Performed by: PHYSICIAN ASSISTANT

## 2025-08-18 PROCEDURE — 4010F ACE/ARB THERAPY RXD/TAKEN: CPT | Mod: CPTII,S$GLB,, | Performed by: PHYSICIAN ASSISTANT

## 2025-08-18 PROCEDURE — 3077F SYST BP >= 140 MM HG: CPT | Mod: CPTII,S$GLB,, | Performed by: PHYSICIAN ASSISTANT

## 2025-08-18 PROCEDURE — 3079F DIAST BP 80-89 MM HG: CPT | Mod: CPTII,S$GLB,, | Performed by: PHYSICIAN ASSISTANT

## 2025-08-18 PROCEDURE — 1159F MED LIST DOCD IN RCRD: CPT | Mod: CPTII,S$GLB,, | Performed by: PHYSICIAN ASSISTANT

## 2025-08-18 PROCEDURE — 3044F HG A1C LEVEL LT 7.0%: CPT | Mod: CPTII,S$GLB,, | Performed by: PHYSICIAN ASSISTANT

## 2025-08-22 RX ORDER — DIAZEPAM 2 MG/1
2 TABLET ORAL
Qty: 2 TABLET | Refills: 0 | Status: SHIPPED | OUTPATIENT
Start: 2025-08-22

## 2025-08-23 ENCOUNTER — PATIENT MESSAGE (OUTPATIENT)
Dept: SURGERY | Facility: CLINIC | Age: 45
End: 2025-08-23
Payer: COMMERCIAL

## 2025-08-26 DIAGNOSIS — N60.01 BILATERAL BREAST CYSTS: Primary | ICD-10-CM

## 2025-08-26 DIAGNOSIS — N60.02 BILATERAL BREAST CYSTS: Primary | ICD-10-CM

## 2025-09-03 ENCOUNTER — HOSPITAL ENCOUNTER (OUTPATIENT)
Dept: RADIOLOGY | Facility: HOSPITAL | Age: 45
Discharge: HOME | End: 2025-09-03
Attending: PHYSICIAN ASSISTANT
Payer: COMMERCIAL

## 2025-09-03 DIAGNOSIS — N60.01 BILATERAL BREAST CYSTS: ICD-10-CM

## 2025-09-03 DIAGNOSIS — N60.02 BILATERAL BREAST CYSTS: ICD-10-CM

## 2025-09-03 PROCEDURE — 76642 ULTRASOUND BREAST LIMITED: CPT | Mod: 26,,, | Performed by: RADIOLOGY

## 2025-09-03 PROCEDURE — 76642 ULTRASOUND BREAST LIMITED: CPT | Mod: TC,50

## (undated) DEVICE — EXTRACTOR TIPLESS 2.4FRX1115CM

## (undated) DEVICE — GUIDE WIRE MOTION .035 X 150CM

## (undated) DEVICE — GOWN SMARTGOWN LVL4 X-LONG XL

## (undated) DEVICE — TRAY DRY SKIN SCRUB PREP

## (undated) DEVICE — SUT CHROMIC 3-0 SH 27IN GUT

## (undated) DEVICE — SOL PVP-I SCRUB 7.5% 4OZ

## (undated) DEVICE — FIBER LASER HOLMIUM 273 MICRON

## (undated) DEVICE — SOL BETADINE 5%

## (undated) DEVICE — EXTRACTOR STONE 4WR NIT 2.2F 1

## (undated) DEVICE — KIT DEV NOVASURE ENDOMETRIAL.

## (undated) DEVICE — SEE MEDLINE ITEM 146313

## (undated) DEVICE — TRAY CYSTO BASIN OMC

## (undated) DEVICE — BAG URO DRAIN

## (undated) DEVICE — PACK CYSTO

## (undated) DEVICE — SOL 9P NACL IRR PIC IL

## (undated) DEVICE — CATH POLLACK OPEN-END FLEXI-TI

## (undated) DEVICE — SYR 10CC LUER LOCK

## (undated) DEVICE — SET INFLOW TUBE HYSTER

## (undated) DEVICE — SOL IRR NACL .9% 3000ML

## (undated) DEVICE — GLOVE BIOGEL SKINSENSE PI 6.5

## (undated) DEVICE — Device

## (undated) DEVICE — GLOVE SENSICARE PI SURG 8

## (undated) DEVICE — SET BASIN 48X48IN 6000ML RING

## (undated) DEVICE — SET IRR URLGY 2LINE UNIV SPIKE